# Patient Record
Sex: FEMALE | Race: WHITE | Employment: OTHER | URBAN - METROPOLITAN AREA
[De-identification: names, ages, dates, MRNs, and addresses within clinical notes are randomized per-mention and may not be internally consistent; named-entity substitution may affect disease eponyms.]

---

## 2018-12-11 ENCOUNTER — OFFICE VISIT (OUTPATIENT)
Dept: INTERNAL MEDICINE CLINIC | Age: 83
End: 2018-12-11
Payer: MEDICARE

## 2018-12-11 VITALS
HEART RATE: 81 BPM | SYSTOLIC BLOOD PRESSURE: 123 MMHG | OXYGEN SATURATION: 98 % | RESPIRATION RATE: 16 BRPM | BODY MASS INDEX: 18.19 KG/M2 | WEIGHT: 120 LBS | DIASTOLIC BLOOD PRESSURE: 70 MMHG | HEIGHT: 68 IN

## 2018-12-11 DIAGNOSIS — N93.9 VAGINAL BLEEDING: ICD-10-CM

## 2018-12-11 DIAGNOSIS — Z79.01 CHRONIC ANTICOAGULATION: ICD-10-CM

## 2018-12-11 DIAGNOSIS — G31.84 MCI (MILD COGNITIVE IMPAIRMENT): ICD-10-CM

## 2018-12-11 DIAGNOSIS — L30.9 DERMATITIS: ICD-10-CM

## 2018-12-11 DIAGNOSIS — R53.1 ASTHENIA: ICD-10-CM

## 2018-12-11 DIAGNOSIS — M81.0 AGE-RELATED OSTEOPOROSIS WITHOUT CURRENT PATHOLOGICAL FRACTURE: ICD-10-CM

## 2018-12-11 DIAGNOSIS — E78.5 HYPERLIPIDEMIA, UNSPECIFIED HYPERLIPIDEMIA TYPE: ICD-10-CM

## 2018-12-11 DIAGNOSIS — J98.4 RESTRICTIVE LUNG DISEASE: ICD-10-CM

## 2018-12-11 DIAGNOSIS — E53.8 VITAMIN B12 DEFICIENCY: ICD-10-CM

## 2018-12-11 DIAGNOSIS — E03.9 ACQUIRED HYPOTHYROIDISM: ICD-10-CM

## 2018-12-11 DIAGNOSIS — Z86.718 HISTORY OF RECURRENT DEEP VEIN THROMBOSIS (DVT): Primary | ICD-10-CM

## 2018-12-11 DIAGNOSIS — Z86.2 HISTORY OF IRON DEFICIENCY ANEMIA: ICD-10-CM

## 2018-12-11 DIAGNOSIS — I35.1 NONRHEUMATIC AORTIC VALVE INSUFFICIENCY: ICD-10-CM

## 2018-12-11 DIAGNOSIS — I10 ESSENTIAL HYPERTENSION: ICD-10-CM

## 2018-12-11 PROCEDURE — 99204 OFFICE O/P NEW MOD 45 MIN: CPT | Performed by: INTERNAL MEDICINE

## 2018-12-11 RX ORDER — CLOTRIMAZOLE AND BETAMETHASONE DIPROPIONATE 10; .64 MG/G; MG/G
CREAM TOPICAL 2 TIMES DAILY
Qty: 45 G | Refills: 1 | Status: SHIPPED | OUTPATIENT
Start: 2018-12-11 | End: 2018-12-21

## 2018-12-11 ASSESSMENT — PATIENT HEALTH QUESTIONNAIRE - PHQ9
SUM OF ALL RESPONSES TO PHQ QUESTIONS 1-9: 0
1. LITTLE INTEREST OR PLEASURE IN DOING THINGS: 0
SUM OF ALL RESPONSES TO PHQ QUESTIONS 1-9: 0
2. FEELING DOWN, DEPRESSED OR HOPELESS: 0
SUM OF ALL RESPONSES TO PHQ9 QUESTIONS 1 & 2: 0

## 2018-12-11 NOTE — PROGRESS NOTES
Small Adult)   Pulse 81   Resp 16   Ht 5' 8\" (1.727 m)   Wt 120 lb (54.4 kg)   SpO2 98%   BMI 18.25 kg/m²       GENERAL APPEARANCE:    Alert, oriented x 2, well developed, cooperative, not in any distress, appears stated age. HEAD:   Normocephalic, atraumatic   EYES:   PERRLA, EOMI, lids normal, conjuctivea clear, sclera anicteric. ENT/ MOUTH:  Mucous membranes are moist. Completely edentulous. She is using a full upper and lower dentures. Tonsils are absent. Hearing to finger rub is intact. External ear normal b/l. Normal nose bridge, no DNS. NECK:    Supple, symmetrical,  trachea midline, no thyromegaly, no carotid bruit or JVD. No masses palpated. LUNGS:    Clear to auscultation bilaterally, respirations unlabored, accessory muscles are not used. HEART:     Regular rate and rhythm, S1 and S2 normal, JUAN 3/6, no rub or gallop. PMI in MCL. ABDOMEN:    Soft, non-tender, bowel sounds are normoactive, no masses, no hepatospleenomegaly. LYMPHATIC:  No occipital, post and preauricular, cervical, supraclavicular/infraclavicular lymphadenopathy noted. EXTREMITY:   No bipedal edema. she has chronic discoloration of left foot and left lower leg. She has prominent varicose vein in both lower legs. NEURO:  Alert, oriented to person, place. CN II- PERRL, visual Field intact. CN II, IV, VI- EOM full, no nystagmus     CN V- normal facial sensation. CN VII- facial movement symmetric, strength normal, no facial droop. CN IX, X- symmetric elevation of soft palate, no dysrthria     CN XI- shoulder shrug normal. Normal head rotation     CN XII- tongue midline, no fasciculation. Sensation intact to touch, pin, vibration and proprioception b/l. Reflexes are +2 bilateral, Plantar response is flexor     Motor 5/5 b/l     Normal Gait and station. No ataxia.   FOOT EXAM:   Warm feet, no calus, open sores or ulcers,      Sensation intact to touch, temp, proprioception and

## 2018-12-13 ENCOUNTER — TELEPHONE (OUTPATIENT)
Dept: INTERNAL MEDICINE CLINIC | Age: 83
End: 2018-12-13

## 2018-12-13 ENCOUNTER — HOSPITAL ENCOUNTER (OUTPATIENT)
Age: 83
Setting detail: SPECIMEN
Discharge: HOME | End: 2018-12-13
Payer: MEDICARE

## 2018-12-20 ENCOUNTER — TELEPHONE (OUTPATIENT)
Dept: INTERNAL MEDICINE CLINIC | Age: 83
End: 2018-12-20

## 2018-12-20 ENCOUNTER — HOSPITAL ENCOUNTER (OUTPATIENT)
Age: 83
Setting detail: SPECIMEN
Discharge: HOME | End: 2018-12-20
Payer: MEDICARE

## 2018-12-20 DIAGNOSIS — E03.9 ACQUIRED HYPOTHYROIDISM: Primary | ICD-10-CM

## 2018-12-20 DIAGNOSIS — M81.0 AGE-RELATED OSTEOPOROSIS WITHOUT CURRENT PATHOLOGICAL FRACTURE: ICD-10-CM

## 2018-12-20 PROBLEM — Z79.01 CHRONIC ANTICOAGULATION: Status: ACTIVE | Noted: 2018-12-20

## 2018-12-20 PROBLEM — Z86.718 HISTORY OF RECURRENT DEEP VEIN THROMBOSIS (DVT): Status: ACTIVE | Noted: 2018-12-20

## 2018-12-20 PROBLEM — G31.84 MCI (MILD COGNITIVE IMPAIRMENT): Status: ACTIVE | Noted: 2018-12-20

## 2018-12-20 PROBLEM — J98.4 RESTRICTIVE LUNG DISEASE: Status: ACTIVE | Noted: 2018-12-20

## 2018-12-20 LAB
ALBUMIN SERPL-MCNC: 4.1 GM/DL (ref 3.4–5)
ALP BLD-CCNC: 61 IU/L (ref 40–129)
ALT SERPL-CCNC: 13 U/L (ref 10–40)
ANION GAP SERPL CALCULATED.3IONS-SCNC: 11 MMOL/L (ref 4–16)
AST SERPL-CCNC: 18 IU/L (ref 15–37)
BASOPHILS ABSOLUTE: 0.1 K/CU MM
BASOPHILS RELATIVE PERCENT: 0.9 % (ref 0–1)
BILIRUB SERPL-MCNC: 0.3 MG/DL (ref 0–1)
BUN BLDV-MCNC: 35 MG/DL (ref 6–23)
CALCIUM SERPL-MCNC: 9 MG/DL (ref 8.3–10.6)
CHLORIDE BLD-SCNC: 103 MMOL/L (ref 99–110)
CHOLESTEROL: 176 MG/DL
CO2: 29 MMOL/L (ref 21–32)
CREAT SERPL-MCNC: 0.8 MG/DL (ref 0.6–1.1)
DIFFERENTIAL TYPE: ABNORMAL
EOSINOPHILS ABSOLUTE: 0.3 K/CU MM
EOSINOPHILS RELATIVE PERCENT: 5.1 % (ref 0–3)
FERRITIN: 24 NG/ML (ref 15–150)
FOLATE: >20 NG/ML (ref 3.1–17.5)
GFR AFRICAN AMERICAN: >60 ML/MIN/1.73M2
GFR NON-AFRICAN AMERICAN: >60 ML/MIN/1.73M2
GLUCOSE BLD-MCNC: 78 MG/DL (ref 70–99)
HCT VFR BLD CALC: 41.8 % (ref 37–47)
HDLC SERPL-MCNC: 99 MG/DL
HEMOGLOBIN: 12.2 GM/DL (ref 12.5–16)
IMMATURE NEUTROPHIL %: 0.2 % (ref 0–0.43)
LDL CHOLESTEROL DIRECT: 80 MG/DL
LYMPHOCYTES ABSOLUTE: 1.7 K/CU MM
LYMPHOCYTES RELATIVE PERCENT: 30.1 % (ref 24–44)
MCH RBC QN AUTO: 28.2 PG (ref 27–31)
MCHC RBC AUTO-ENTMCNC: 29.2 % (ref 32–36)
MCV RBC AUTO: 96.5 FL (ref 78–100)
MONOCYTES ABSOLUTE: 0.5 K/CU MM
MONOCYTES RELATIVE PERCENT: 8.9 % (ref 0–4)
NUCLEATED RBC %: 0 %
PDW BLD-RTO: 15 % (ref 11.7–14.9)
PLATELET # BLD: 154 K/CU MM (ref 140–440)
PMV BLD AUTO: 12.2 FL (ref 7.5–11.1)
POTASSIUM SERPL-SCNC: 4.4 MMOL/L (ref 3.5–5.1)
RBC # BLD: 4.33 M/CU MM (ref 4.2–5.4)
SEGMENTED NEUTROPHILS ABSOLUTE COUNT: 3.1 K/CU MM
SEGMENTED NEUTROPHILS RELATIVE PERCENT: 54.8 % (ref 36–66)
SODIUM BLD-SCNC: 143 MMOL/L (ref 135–145)
T4 FREE: 0.83 NG/DL (ref 0.9–1.8)
TOTAL IMMATURE NEUTOROPHIL: 0.01 K/CU MM
TOTAL NUCLEATED RBC: 0 K/CU MM
TOTAL PROTEIN: 6.5 GM/DL (ref 6.4–8.2)
TRIGL SERPL-MCNC: 72 MG/DL
TSH HIGH SENSITIVITY: 17.14 UIU/ML (ref 0.27–4.2)
VITAMIN B-12: 871.5 PG/ML (ref 211–911)
VITAMIN D 25-HYDROXY: 32.52 NG/ML
WBC # BLD: 5.7 K/CU MM (ref 4–10.5)

## 2018-12-20 PROCEDURE — 84443 ASSAY THYROID STIM HORMONE: CPT

## 2018-12-20 PROCEDURE — 82306 VITAMIN D 25 HYDROXY: CPT

## 2018-12-20 PROCEDURE — 82607 VITAMIN B-12: CPT

## 2018-12-20 PROCEDURE — 36415 COLL VENOUS BLD VENIPUNCTURE: CPT

## 2018-12-20 PROCEDURE — 80061 LIPID PANEL: CPT

## 2018-12-20 PROCEDURE — 82728 ASSAY OF FERRITIN: CPT

## 2018-12-20 PROCEDURE — 82746 ASSAY OF FOLIC ACID SERUM: CPT

## 2018-12-20 PROCEDURE — 80053 COMPREHEN METABOLIC PANEL: CPT

## 2018-12-20 PROCEDURE — 83721 ASSAY OF BLOOD LIPOPROTEIN: CPT

## 2018-12-20 PROCEDURE — 84439 ASSAY OF FREE THYROXINE: CPT

## 2018-12-20 PROCEDURE — 85025 COMPLETE CBC W/AUTO DIFF WBC: CPT

## 2018-12-20 RX ORDER — LEVOTHYROXINE SODIUM 0.05 MG/1
50 TABLET ORAL DAILY
Qty: 90 TABLET | Refills: 0 | Status: SHIPPED | OUTPATIENT
Start: 2018-12-20 | End: 2018-12-21

## 2018-12-21 ENCOUNTER — OFFICE VISIT (OUTPATIENT)
Dept: FAMILY MEDICINE CLINIC | Age: 83
End: 2018-12-21
Payer: MEDICARE

## 2018-12-21 VITALS
HEART RATE: 86 BPM | HEIGHT: 64 IN | TEMPERATURE: 98.4 F | DIASTOLIC BLOOD PRESSURE: 70 MMHG | SYSTOLIC BLOOD PRESSURE: 130 MMHG | WEIGHT: 95 LBS | OXYGEN SATURATION: 99 % | BODY MASS INDEX: 16.22 KG/M2

## 2018-12-21 DIAGNOSIS — Z86.718 HISTORY OF RECURRENT DEEP VEIN THROMBOSIS (DVT): ICD-10-CM

## 2018-12-21 DIAGNOSIS — Z79.01 CHRONIC ANTICOAGULATION: ICD-10-CM

## 2018-12-21 DIAGNOSIS — L30.9 DERMATITIS: ICD-10-CM

## 2018-12-21 PROCEDURE — 1123F ACP DISCUSS/DSCN MKR DOCD: CPT | Performed by: NURSE PRACTITIONER

## 2018-12-21 PROCEDURE — 99213 OFFICE O/P EST LOW 20 MIN: CPT | Performed by: NURSE PRACTITIONER

## 2018-12-21 PROCEDURE — G8427 DOCREV CUR MEDS BY ELIG CLIN: HCPCS | Performed by: NURSE PRACTITIONER

## 2018-12-21 PROCEDURE — 1036F TOBACCO NON-USER: CPT | Performed by: NURSE PRACTITIONER

## 2018-12-21 PROCEDURE — 1090F PRES/ABSN URINE INCON ASSESS: CPT | Performed by: NURSE PRACTITIONER

## 2018-12-21 PROCEDURE — G8484 FLU IMMUNIZE NO ADMIN: HCPCS | Performed by: NURSE PRACTITIONER

## 2018-12-21 PROCEDURE — 1101F PT FALLS ASSESS-DOCD LE1/YR: CPT | Performed by: NURSE PRACTITIONER

## 2018-12-21 PROCEDURE — 4040F PNEUMOC VAC/ADMIN/RCVD: CPT | Performed by: NURSE PRACTITIONER

## 2018-12-21 PROCEDURE — G8419 CALC BMI OUT NRM PARAM NOF/U: HCPCS | Performed by: NURSE PRACTITIONER

## 2018-12-21 ASSESSMENT — ENCOUNTER SYMPTOMS
CHEST TIGHTNESS: 0
NAUSEA: 0
COUGH: 0
COLOR CHANGE: 1
SHORTNESS OF BREATH: 0
VOMITING: 0
WHEEZING: 0

## 2018-12-21 NOTE — PATIENT INSTRUCTIONS
[facility] and set up plan to have your prescriptions delivered - [phone]  Use the Bactroban and Lotrisone medicine for your left leg  Check with pharmacy to see if there is a prescription for your thyroid - take the medicine as directed  Keep left leg elevated at least twice a day to decrease the swelling  Increase your fluids; also continue drinking your Ensure  Follow up as needed

## 2018-12-23 ENCOUNTER — APPOINTMENT (OUTPATIENT)
Dept: ULTRASOUND IMAGING | Age: 83
End: 2018-12-23
Payer: MEDICARE

## 2018-12-23 ENCOUNTER — HOSPITAL ENCOUNTER (EMERGENCY)
Age: 83
Discharge: HOME | End: 2018-12-23
Attending: EMERGENCY MEDICINE
Payer: MEDICARE

## 2018-12-23 VITALS
OXYGEN SATURATION: 100 % | WEIGHT: 95 LBS | RESPIRATION RATE: 16 BRPM | HEIGHT: 64 IN | HEART RATE: 87 BPM | BODY MASS INDEX: 16.22 KG/M2 | DIASTOLIC BLOOD PRESSURE: 90 MMHG | SYSTOLIC BLOOD PRESSURE: 145 MMHG | TEMPERATURE: 98.1 F

## 2018-12-23 DIAGNOSIS — L03.116 CELLULITIS OF LEFT LOWER EXTREMITY: ICD-10-CM

## 2018-12-23 DIAGNOSIS — Z86.718 HISTORY OF RECURRENT DEEP VEIN THROMBOSIS (DVT): ICD-10-CM

## 2018-12-23 DIAGNOSIS — I87.2 VENOUS STASIS DERMATITIS OF LEFT LOWER EXTREMITY: ICD-10-CM

## 2018-12-23 DIAGNOSIS — M79.89 LEG SWELLING: Primary | ICD-10-CM

## 2018-12-23 DIAGNOSIS — Z79.01 CHRONIC ANTICOAGULATION: ICD-10-CM

## 2018-12-23 PROCEDURE — 93971 EXTREMITY STUDY: CPT

## 2018-12-23 PROCEDURE — 6370000000 HC RX 637 (ALT 250 FOR IP): Performed by: PHYSICIAN ASSISTANT

## 2018-12-23 PROCEDURE — 99283 EMERGENCY DEPT VISIT LOW MDM: CPT

## 2018-12-23 RX ORDER — ACETAMINOPHEN 325 MG/1
650 TABLET ORAL EVERY 4 HOURS PRN
Status: DISCONTINUED | OUTPATIENT
Start: 2018-12-23 | End: 2018-12-23 | Stop reason: HOSPADM

## 2018-12-23 RX ORDER — DOXYCYCLINE HYCLATE 100 MG/1
100 CAPSULE ORAL 2 TIMES DAILY
Qty: 20 CAPSULE | Refills: 0 | Status: SHIPPED | OUTPATIENT
Start: 2018-12-23 | End: 2019-01-01

## 2018-12-23 RX ADMIN — ACETAMINOPHEN 650 MG: 325 TABLET ORAL at 14:31

## 2018-12-23 ASSESSMENT — PAIN DESCRIPTION - LOCATION: LOCATION: LEG

## 2018-12-23 ASSESSMENT — PAIN DESCRIPTION - ORIENTATION: ORIENTATION: LEFT

## 2018-12-23 ASSESSMENT — PAIN SCALES - GENERAL
PAINLEVEL_OUTOF10: 7
PAINLEVEL_OUTOF10: 7

## 2018-12-23 ASSESSMENT — PAIN DESCRIPTION - PAIN TYPE: TYPE: ACUTE PAIN

## 2018-12-23 NOTE — ED PROVIDER NOTES
I independently examined and evaluated Joanne Kessler. In brief, Patient with leg pain. Focused exam revealed Redness warmth noted to left lower extremity. Neurovascular exam intact    ED course: Patient here with leg complaints ultrasound negative for DVT, has a cellulitis will be placed on antibiotics and we'll discharge, refer her to outpatient follow-up as she is new to town, she understands and agrees return warnings given. All diagnostic, treatment, and disposition decisions were made by myself in conjunction with the advanced practice provider. For all further details of the patient's emergency department visit, please see the advanced practice provider's documentation. Comment: Please note this report has been produced using speech recognition software and may contain errors related to that system including errors in grammar, punctuation, and spelling, as well as words and phrases that may be inappropriate. If there are any questions or concerns please feel free to contact the dictating provider for clarification.         Phuong Rodriguez MD  12/23/18 [id number]
Sheri Vargas MD (12/23/18 15:21:32)                Impression:    1. No evidence of DVT in the left lower extremity. 2. Left lower extremity edema. Narrative:    EXAMINATION:  DUPLEX VENOUS ULTRASOUND OF THE LEFT LOWER EXTREMITY    12/23/2018 2:34 pm    TECHNIQUE:  Duplex ultrasound and Doppler images were obtained of the left lower  extremity. COMPARISON:  None. HISTORY:  ORDERING SYSTEM PROVIDED HISTORY: leg swelling  TECHNOLOGIST PROVIDED HISTORY:  Reason for exam:->leg swelling  Acuity: Acute  Type of Exam: Initial    FINDINGS:  The visualized veins of the left lower extremity are patent and free of  echogenic thrombus. The veins are normally compressible and have normal  phasic flow. ED COURSE & MEDICAL DECISION MAKING       Vital signs and nursing notes reviewed during ED course. I have independently evaluated this patient . Supervising MD present in the Emergency Department, available for consultation, throughout entirety of  patient care. All pertinent Lab data and radiographic results reviewed with patient at bedside. The patient and/or the family were informed of the results of any tests/labs/imaging, the treatment plan, and time was allotted to answer questions. Pt presents as above. Vitals today show the pt is afebrile. 100% on room air. Non tachycardic. Pt is hypertensive. Eyes any headache or visual symptoms no chest pain or shortness of breath. No urinary symptoms. I suspect this is likely chronic, I suspect some confusion, dementia associated with the confusion of correct treatment in length of time from symptoms starting. We'll obtain ultrasound and then discuss further with the patient. Ultrasound shows no evidence of DVT. It appears as an complication given the patient is new to the area has some changeover in follow-up, care. Confusion on medications, patient has some mild baseline dementia family at bedside agrees with this.  I did

## 2018-12-23 NOTE — ED NOTES
Bed: ED-31  Expected date: 12/23/18  Expected time:   Means of arrival: [facility] Department  Comments:  M8  Leg red, swollen     Armando Harris, BETTE  12/23/18 [id number]

## 2019-01-01 ENCOUNTER — HOSPITAL ENCOUNTER (INPATIENT)
Age: 84
LOS: 4 days | Discharge: NURSING, REHAB OR LONG-TERM CARE | DRG: 603 | End: 2019-01-05
Attending: EMERGENCY MEDICINE | Admitting: INTERNAL MEDICINE
Payer: MEDICARE

## 2019-01-01 DIAGNOSIS — L03.116 CELLULITIS OF LEFT LOWER EXTREMITY: Primary | ICD-10-CM

## 2019-01-01 PROBLEM — L03.90 CELLULITIS: Status: ACTIVE | Noted: 2019-01-01

## 2019-01-01 LAB
ALBUMIN SERPL-MCNC: 4.1 GM/DL (ref 3.4–5)
ALP BLD-CCNC: 69 IU/L (ref 40–129)
ALT SERPL-CCNC: 14 U/L (ref 10–40)
ANION GAP SERPL CALCULATED.3IONS-SCNC: 8 MMOL/L (ref 4–16)
AST SERPL-CCNC: 18 IU/L (ref 15–37)
BASOPHILS ABSOLUTE: 0 K/CU MM
BASOPHILS RELATIVE PERCENT: 0.5 % (ref 0–1)
BILIRUB SERPL-MCNC: 0.3 MG/DL (ref 0–1)
BUN BLDV-MCNC: 28 MG/DL (ref 6–23)
CALCIUM SERPL-MCNC: 9 MG/DL (ref 8.3–10.6)
CHLORIDE BLD-SCNC: 101 MMOL/L (ref 99–110)
CO2: 30 MMOL/L (ref 21–32)
CREAT SERPL-MCNC: 0.7 MG/DL (ref 0.6–1.1)
DIFFERENTIAL TYPE: ABNORMAL
EOSINOPHILS ABSOLUTE: 0.1 K/CU MM
EOSINOPHILS RELATIVE PERCENT: 2.1 % (ref 0–3)
GFR AFRICAN AMERICAN: >60 ML/MIN/1.73M2
GFR NON-AFRICAN AMERICAN: >60 ML/MIN/1.73M2
GLUCOSE BLD-MCNC: 83 MG/DL (ref 70–99)
HCT VFR BLD CALC: 38.8 % (ref 37–47)
HEMOGLOBIN: 11.7 GM/DL (ref 12.5–16)
IMMATURE NEUTROPHIL %: 0.3 % (ref 0–0.43)
LYMPHOCYTES ABSOLUTE: 1.1 K/CU MM
LYMPHOCYTES RELATIVE PERCENT: 17 % (ref 24–44)
MCH RBC QN AUTO: 27.9 PG (ref 27–31)
MCHC RBC AUTO-ENTMCNC: 30.2 % (ref 32–36)
MCV RBC AUTO: 92.6 FL (ref 78–100)
MONOCYTES ABSOLUTE: 0.5 K/CU MM
MONOCYTES RELATIVE PERCENT: 7.8 % (ref 0–4)
NUCLEATED RBC %: 0 %
PDW BLD-RTO: 14.8 % (ref 11.7–14.9)
PLATELET # BLD: 161 K/CU MM (ref 140–440)
PMV BLD AUTO: 11.5 FL (ref 7.5–11.1)
POTASSIUM SERPL-SCNC: 4.1 MMOL/L (ref 3.5–5.1)
RBC # BLD: 4.19 M/CU MM (ref 4.2–5.4)
SEGMENTED NEUTROPHILS ABSOLUTE COUNT: 4.7 K/CU MM
SEGMENTED NEUTROPHILS RELATIVE PERCENT: 72.3 % (ref 36–66)
SODIUM BLD-SCNC: 139 MMOL/L (ref 135–145)
TOTAL IMMATURE NEUTOROPHIL: 0.02 K/CU MM
TOTAL NUCLEATED RBC: 0 K/CU MM
TOTAL PROTEIN: 7.1 GM/DL (ref 6.4–8.2)
WBC # BLD: 6.5 K/CU MM (ref 4–10.5)

## 2019-01-01 PROCEDURE — 80053 COMPREHEN METABOLIC PANEL: CPT

## 2019-01-01 PROCEDURE — 1200000000 HC SEMI PRIVATE

## 2019-01-01 PROCEDURE — 2580000003 HC RX 258: Performed by: INTERNAL MEDICINE

## 2019-01-01 PROCEDURE — 2580000003 HC RX 258: Performed by: EMERGENCY MEDICINE

## 2019-01-01 PROCEDURE — 36415 COLL VENOUS BLD VENIPUNCTURE: CPT

## 2019-01-01 PROCEDURE — 87040 BLOOD CULTURE FOR BACTERIA: CPT

## 2019-01-01 PROCEDURE — 85025 COMPLETE CBC W/AUTO DIFF WBC: CPT

## 2019-01-01 PROCEDURE — 6370000000 HC RX 637 (ALT 250 FOR IP): Performed by: INTERNAL MEDICINE

## 2019-01-01 PROCEDURE — 99284 EMERGENCY DEPT VISIT MOD MDM: CPT

## 2019-01-01 PROCEDURE — 2500000003 HC RX 250 WO HCPCS: Performed by: EMERGENCY MEDICINE

## 2019-01-01 PROCEDURE — 6360000002 HC RX W HCPCS: Performed by: EMERGENCY MEDICINE

## 2019-01-01 RX ORDER — ACETAMINOPHEN 325 MG/1
650 TABLET ORAL EVERY 4 HOURS PRN
Status: DISCONTINUED | OUTPATIENT
Start: 2019-01-01 | End: 2019-01-05 | Stop reason: HOSPADM

## 2019-01-01 RX ORDER — SODIUM CHLORIDE 0.9 % (FLUSH) 0.9 %
10 SYRINGE (ML) INJECTION PRN
Status: DISCONTINUED | OUTPATIENT
Start: 2019-01-01 | End: 2019-01-05 | Stop reason: HOSPADM

## 2019-01-01 RX ORDER — SODIUM CHLORIDE 0.9 % (FLUSH) 0.9 %
10 SYRINGE (ML) INJECTION EVERY 12 HOURS SCHEDULED
Status: DISCONTINUED | OUTPATIENT
Start: 2019-01-01 | End: 2019-01-05 | Stop reason: HOSPADM

## 2019-01-01 RX ORDER — VANCOMYCIN HYDROCHLORIDE 1 G/200ML
1000 INJECTION, SOLUTION INTRAVENOUS ONCE
Status: COMPLETED | OUTPATIENT
Start: 2019-01-01 | End: 2019-01-01

## 2019-01-01 RX ADMIN — CLINDAMYCIN PHOSPHATE 600 MG: 150 INJECTION, SOLUTION, CONCENTRATE INTRAVENOUS at 16:14

## 2019-01-01 RX ADMIN — VANCOMYCIN HYDROCHLORIDE 1000 MG: 1 INJECTION, SOLUTION INTRAVENOUS at 16:45

## 2019-01-01 RX ADMIN — SODIUM CHLORIDE, PRESERVATIVE FREE 10 ML: 5 INJECTION INTRAVENOUS at 23:03

## 2019-01-01 RX ADMIN — ACETAMINOPHEN 650 MG: 325 TABLET ORAL at 23:33

## 2019-01-01 ASSESSMENT — PAIN DESCRIPTION - PAIN TYPE
TYPE: ACUTE PAIN
TYPE: ACUTE PAIN

## 2019-01-01 ASSESSMENT — PAIN SCALES - GENERAL
PAINLEVEL_OUTOF10: 6
PAINLEVEL_OUTOF10: 0
PAINLEVEL_OUTOF10: 0
PAINLEVEL_OUTOF10: 3

## 2019-01-01 ASSESSMENT — PAIN DESCRIPTION - LOCATION
LOCATION: LEG
LOCATION: LEG

## 2019-01-01 ASSESSMENT — PAIN DESCRIPTION - ORIENTATION
ORIENTATION: LEFT
ORIENTATION: LEFT

## 2019-01-01 ASSESSMENT — PAIN DESCRIPTION - DESCRIPTORS: DESCRIPTORS: CONSTANT

## 2019-01-01 ASSESSMENT — PAIN DESCRIPTION - PROGRESSION: CLINICAL_PROGRESSION: GRADUALLY WORSENING

## 2019-01-01 ASSESSMENT — PAIN DESCRIPTION - ONSET: ONSET: ON-GOING

## 2019-01-02 PROBLEM — E43 SEVERE MALNUTRITION (HCC): Chronic | Status: ACTIVE | Noted: 2019-01-02

## 2019-01-02 LAB
ANION GAP SERPL CALCULATED.3IONS-SCNC: 12 MMOL/L (ref 4–16)
BASOPHILS ABSOLUTE: 0.1 K/CU MM
BASOPHILS RELATIVE PERCENT: 1.2 % (ref 0–1)
BUN BLDV-MCNC: 21 MG/DL (ref 6–23)
CALCIUM SERPL-MCNC: 8.9 MG/DL (ref 8.3–10.6)
CHLORIDE BLD-SCNC: 104 MMOL/L (ref 99–110)
CO2: 27 MMOL/L (ref 21–32)
CREAT SERPL-MCNC: 0.9 MG/DL (ref 0.6–1.1)
DIFFERENTIAL TYPE: ABNORMAL
EOSINOPHILS ABSOLUTE: 0.2 K/CU MM
EOSINOPHILS RELATIVE PERCENT: 5.7 % (ref 0–3)
GFR AFRICAN AMERICAN: >60 ML/MIN/1.73M2
GFR NON-AFRICAN AMERICAN: 59 ML/MIN/1.73M2
GLUCOSE BLD-MCNC: 82 MG/DL (ref 70–99)
HCT VFR BLD CALC: 40.4 % (ref 37–47)
HEMOGLOBIN: 11.9 GM/DL (ref 12.5–16)
IMMATURE NEUTROPHIL %: 0 % (ref 0–0.43)
LYMPHOCYTES ABSOLUTE: 1 K/CU MM
LYMPHOCYTES RELATIVE PERCENT: 23.3 % (ref 24–44)
MCH RBC QN AUTO: 27.5 PG (ref 27–31)
MCHC RBC AUTO-ENTMCNC: 29.5 % (ref 32–36)
MCV RBC AUTO: 93.5 FL (ref 78–100)
MONOCYTES ABSOLUTE: 0.4 K/CU MM
MONOCYTES RELATIVE PERCENT: 9.1 % (ref 0–4)
NUCLEATED RBC %: 0 %
PDW BLD-RTO: 14.6 % (ref 11.7–14.9)
PLATELET # BLD: 149 K/CU MM (ref 140–440)
PMV BLD AUTO: 11.6 FL (ref 7.5–11.1)
POTASSIUM SERPL-SCNC: 4.2 MMOL/L (ref 3.5–5.1)
RBC # BLD: 4.32 M/CU MM (ref 4.2–5.4)
SEGMENTED NEUTROPHILS ABSOLUTE COUNT: 2.5 K/CU MM
SEGMENTED NEUTROPHILS RELATIVE PERCENT: 60.7 % (ref 36–66)
SODIUM BLD-SCNC: 143 MMOL/L (ref 135–145)
TOTAL IMMATURE NEUTOROPHIL: 0 K/CU MM
TOTAL NUCLEATED RBC: 0 K/CU MM
WBC # BLD: 4.1 K/CU MM (ref 4–10.5)

## 2019-01-02 PROCEDURE — 36415 COLL VENOUS BLD VENIPUNCTURE: CPT

## 2019-01-02 PROCEDURE — 6370000000 HC RX 637 (ALT 250 FOR IP): Performed by: INTERNAL MEDICINE

## 2019-01-02 PROCEDURE — 80048 BASIC METABOLIC PNL TOTAL CA: CPT

## 2019-01-02 PROCEDURE — 2500000003 HC RX 250 WO HCPCS: Performed by: INTERNAL MEDICINE

## 2019-01-02 PROCEDURE — 2580000003 HC RX 258: Performed by: INTERNAL MEDICINE

## 2019-01-02 PROCEDURE — 1200000000 HC SEMI PRIVATE

## 2019-01-02 PROCEDURE — 85025 COMPLETE CBC W/AUTO DIFF WBC: CPT

## 2019-01-02 RX ADMIN — CLINDAMYCIN PHOSPHATE 600 MG: 150 INJECTION, SOLUTION, CONCENTRATE INTRAVENOUS at 14:16

## 2019-01-02 RX ADMIN — ACETAMINOPHEN 650 MG: 325 TABLET ORAL at 15:40

## 2019-01-02 RX ADMIN — ACETAMINOPHEN 650 MG: 325 TABLET ORAL at 06:46

## 2019-01-02 RX ADMIN — RIVAROXABAN 15 MG: 15 TABLET, FILM COATED ORAL at 08:03

## 2019-01-02 RX ADMIN — SODIUM CHLORIDE, PRESERVATIVE FREE 10 ML: 5 INJECTION INTRAVENOUS at 08:02

## 2019-01-02 RX ADMIN — ACETAMINOPHEN 650 MG: 325 TABLET ORAL at 21:06

## 2019-01-02 RX ADMIN — CLINDAMYCIN PHOSPHATE 600 MG: 150 INJECTION, SOLUTION, CONCENTRATE INTRAVENOUS at 21:04

## 2019-01-02 RX ADMIN — SODIUM CHLORIDE, PRESERVATIVE FREE 10 ML: 5 INJECTION INTRAVENOUS at 21:04

## 2019-01-02 RX ADMIN — ACETAMINOPHEN 650 MG: 325 TABLET ORAL at 11:26

## 2019-01-02 ASSESSMENT — PAIN DESCRIPTION - DESCRIPTORS
DESCRIPTORS: CONSTANT

## 2019-01-02 ASSESSMENT — PAIN SCALES - GENERAL
PAINLEVEL_OUTOF10: 3
PAINLEVEL_OUTOF10: 7
PAINLEVEL_OUTOF10: 6
PAINLEVEL_OUTOF10: 7
PAINLEVEL_OUTOF10: 10

## 2019-01-02 ASSESSMENT — PAIN DESCRIPTION - ORIENTATION
ORIENTATION: LEFT

## 2019-01-02 ASSESSMENT — PAIN DESCRIPTION - LOCATION
LOCATION: LEG

## 2019-01-02 ASSESSMENT — PAIN DESCRIPTION - ONSET
ONSET: ON-GOING

## 2019-01-02 ASSESSMENT — PAIN DESCRIPTION - PROGRESSION
CLINICAL_PROGRESSION: GRADUALLY WORSENING
CLINICAL_PROGRESSION: GRADUALLY WORSENING

## 2019-01-02 ASSESSMENT — PAIN DESCRIPTION - FREQUENCY
FREQUENCY: CONTINUOUS

## 2019-01-02 ASSESSMENT — PAIN DESCRIPTION - PAIN TYPE
TYPE: ACUTE PAIN

## 2019-01-03 LAB
ANION GAP SERPL CALCULATED.3IONS-SCNC: 8 MMOL/L (ref 4–16)
BASOPHILS ABSOLUTE: 0.1 K/CU MM
BASOPHILS RELATIVE PERCENT: 1.1 % (ref 0–1)
BUN BLDV-MCNC: 21 MG/DL (ref 6–23)
CALCIUM SERPL-MCNC: 8.9 MG/DL (ref 8.3–10.6)
CHLORIDE BLD-SCNC: 102 MMOL/L (ref 99–110)
CO2: 30 MMOL/L (ref 21–32)
CREAT SERPL-MCNC: 0.9 MG/DL (ref 0.6–1.1)
DIFFERENTIAL TYPE: ABNORMAL
EOSINOPHILS ABSOLUTE: 0.2 K/CU MM
EOSINOPHILS RELATIVE PERCENT: 5.2 % (ref 0–3)
GFR AFRICAN AMERICAN: >60 ML/MIN/1.73M2
GFR NON-AFRICAN AMERICAN: 59 ML/MIN/1.73M2
GLUCOSE BLD-MCNC: 86 MG/DL (ref 70–99)
HCT VFR BLD CALC: 38.9 % (ref 37–47)
HEMOGLOBIN: 12 GM/DL (ref 12.5–16)
IMMATURE NEUTROPHIL %: 0.2 % (ref 0–0.43)
LYMPHOCYTES ABSOLUTE: 1 K/CU MM
LYMPHOCYTES RELATIVE PERCENT: 22.8 % (ref 24–44)
MCH RBC QN AUTO: 28 PG (ref 27–31)
MCHC RBC AUTO-ENTMCNC: 30.8 % (ref 32–36)
MCV RBC AUTO: 90.7 FL (ref 78–100)
MONOCYTES ABSOLUTE: 0.4 K/CU MM
MONOCYTES RELATIVE PERCENT: 8.7 % (ref 0–4)
NUCLEATED RBC %: 0 %
PDW BLD-RTO: 14.6 % (ref 11.7–14.9)
PLATELET # BLD: 161 K/CU MM (ref 140–440)
PMV BLD AUTO: 11.4 FL (ref 7.5–11.1)
POTASSIUM SERPL-SCNC: 4.2 MMOL/L (ref 3.5–5.1)
RBC # BLD: 4.29 M/CU MM (ref 4.2–5.4)
SEGMENTED NEUTROPHILS ABSOLUTE COUNT: 2.7 K/CU MM
SEGMENTED NEUTROPHILS RELATIVE PERCENT: 62 % (ref 36–66)
SODIUM BLD-SCNC: 140 MMOL/L (ref 135–145)
TOTAL IMMATURE NEUTOROPHIL: 0.01 K/CU MM
TOTAL NUCLEATED RBC: 0 K/CU MM
TROPONIN T: <0.01 NG/ML
VANCOMYCIN RANDOM: 4.6 UG/ML
WBC # BLD: 4.4 K/CU MM (ref 4–10.5)

## 2019-01-03 PROCEDURE — 6370000000 HC RX 637 (ALT 250 FOR IP)

## 2019-01-03 PROCEDURE — 6360000002 HC RX W HCPCS: Performed by: INTERNAL MEDICINE

## 2019-01-03 PROCEDURE — 94761 N-INVAS EAR/PLS OXIMETRY MLT: CPT

## 2019-01-03 PROCEDURE — 2500000003 HC RX 250 WO HCPCS: Performed by: INTERNAL MEDICINE

## 2019-01-03 PROCEDURE — 85025 COMPLETE CBC W/AUTO DIFF WBC: CPT

## 2019-01-03 PROCEDURE — 36415 COLL VENOUS BLD VENIPUNCTURE: CPT

## 2019-01-03 PROCEDURE — 80048 BASIC METABOLIC PNL TOTAL CA: CPT

## 2019-01-03 PROCEDURE — 80202 ASSAY OF VANCOMYCIN: CPT

## 2019-01-03 PROCEDURE — 2580000003 HC RX 258: Performed by: INTERNAL MEDICINE

## 2019-01-03 PROCEDURE — 1200000000 HC SEMI PRIVATE

## 2019-01-03 PROCEDURE — 93005 ELECTROCARDIOGRAM TRACING: CPT | Performed by: INTERNAL MEDICINE

## 2019-01-03 PROCEDURE — 93005 ELECTROCARDIOGRAM TRACING: CPT | Performed by: HOSPITALIST

## 2019-01-03 PROCEDURE — 84484 ASSAY OF TROPONIN QUANT: CPT

## 2019-01-03 PROCEDURE — 6370000000 HC RX 637 (ALT 250 FOR IP): Performed by: INTERNAL MEDICINE

## 2019-01-03 RX ORDER — ASPIRIN 81 MG/1
TABLET, CHEWABLE ORAL
Status: COMPLETED
Start: 2019-01-03 | End: 2019-01-03

## 2019-01-03 RX ORDER — NITROGLYCERIN 0.4 MG/1
0.4 TABLET SUBLINGUAL EVERY 5 MIN PRN
Status: DISCONTINUED | OUTPATIENT
Start: 2019-01-03 | End: 2019-01-04

## 2019-01-03 RX ORDER — ASPIRIN 325 MG
325 TABLET ORAL DAILY
Status: DISCONTINUED | OUTPATIENT
Start: 2019-01-03 | End: 2019-01-04

## 2019-01-03 RX ORDER — NITROGLYCERIN 0.4 MG/1
TABLET SUBLINGUAL
Status: COMPLETED
Start: 2019-01-03 | End: 2019-01-03

## 2019-01-03 RX ORDER — TRAMADOL HYDROCHLORIDE 50 MG/1
50 TABLET ORAL EVERY 6 HOURS PRN
Status: DISCONTINUED | OUTPATIENT
Start: 2019-01-03 | End: 2019-01-05 | Stop reason: HOSPADM

## 2019-01-03 RX ORDER — VANCOMYCIN HYDROCHLORIDE 1 G/200ML
1000 INJECTION, SOLUTION INTRAVENOUS ONCE
Status: COMPLETED | OUTPATIENT
Start: 2019-01-03 | End: 2019-01-03

## 2019-01-03 RX ORDER — ASPIRIN 81 MG/1
TABLET, CHEWABLE ORAL
Status: DISPENSED
Start: 2019-01-03 | End: 2019-01-04

## 2019-01-03 RX ADMIN — ASPIRIN 81 MG 324 MG: 81 TABLET ORAL at 20:09

## 2019-01-03 RX ADMIN — VANCOMYCIN HYDROCHLORIDE 1000 MG: 1 INJECTION, SOLUTION INTRAVENOUS at 16:37

## 2019-01-03 RX ADMIN — CLINDAMYCIN PHOSPHATE 600 MG: 150 INJECTION, SOLUTION, CONCENTRATE INTRAVENOUS at 06:52

## 2019-01-03 RX ADMIN — TRAMADOL HYDROCHLORIDE 50 MG: 50 TABLET, FILM COATED ORAL at 09:54

## 2019-01-03 RX ADMIN — TRAMADOL HYDROCHLORIDE 50 MG: 50 TABLET, FILM COATED ORAL at 22:14

## 2019-01-03 RX ADMIN — SODIUM CHLORIDE, PRESERVATIVE FREE 10 ML: 5 INJECTION INTRAVENOUS at 09:49

## 2019-01-03 RX ADMIN — NITROGLYCERIN 0.4 MG: 0.4 TABLET SUBLINGUAL at 20:11

## 2019-01-03 RX ADMIN — CLINDAMYCIN PHOSPHATE 600 MG: 150 INJECTION, SOLUTION, CONCENTRATE INTRAVENOUS at 18:05

## 2019-01-03 RX ADMIN — NITROGLYCERIN 0.4 MG: 0.4 TABLET SUBLINGUAL at 20:23

## 2019-01-03 RX ADMIN — NITROGLYCERIN 0.4 MG: 0.4 TABLET SUBLINGUAL at 20:03

## 2019-01-03 RX ADMIN — SODIUM CHLORIDE, PRESERVATIVE FREE 10 ML: 5 INJECTION INTRAVENOUS at 20:23

## 2019-01-03 RX ADMIN — RIVAROXABAN 15 MG: 15 TABLET, FILM COATED ORAL at 09:48

## 2019-01-03 ASSESSMENT — PAIN SCALES - GENERAL
PAINLEVEL_OUTOF10: 6
PAINLEVEL_OUTOF10: 7
PAINLEVEL_OUTOF10: 3
PAINLEVEL_OUTOF10: 8

## 2019-01-03 ASSESSMENT — PAIN DESCRIPTION - PAIN TYPE
TYPE: ACUTE PAIN
TYPE: ACUTE PAIN

## 2019-01-03 ASSESSMENT — PAIN DESCRIPTION - ORIENTATION
ORIENTATION: LEFT
ORIENTATION: RIGHT

## 2019-01-03 ASSESSMENT — PAIN DESCRIPTION - LOCATION
LOCATION: CHEST
LOCATION: LEG

## 2019-01-03 ASSESSMENT — PAIN DESCRIPTION - PROGRESSION
CLINICAL_PROGRESSION: GRADUALLY WORSENING
CLINICAL_PROGRESSION: GRADUALLY WORSENING

## 2019-01-04 LAB
ANION GAP SERPL CALCULATED.3IONS-SCNC: 8 MMOL/L (ref 4–16)
BASOPHILS ABSOLUTE: 0 K/CU MM
BASOPHILS RELATIVE PERCENT: 0.6 % (ref 0–1)
BUN BLDV-MCNC: 26 MG/DL (ref 6–23)
CALCIUM SERPL-MCNC: 8.8 MG/DL (ref 8.3–10.6)
CHLORIDE BLD-SCNC: 102 MMOL/L (ref 99–110)
CO2: 31 MMOL/L (ref 21–32)
CREAT SERPL-MCNC: 0.8 MG/DL (ref 0.6–1.1)
DIFFERENTIAL TYPE: ABNORMAL
EKG ATRIAL RATE: 86 BPM
EKG ATRIAL RATE: 86 BPM
EKG DIAGNOSIS: NORMAL
EKG DIAGNOSIS: NORMAL
EKG P AXIS: 54 DEGREES
EKG P AXIS: 64 DEGREES
EKG P-R INTERVAL: 152 MS
EKG P-R INTERVAL: 164 MS
EKG Q-T INTERVAL: 392 MS
EKG Q-T INTERVAL: 394 MS
EKG QRS DURATION: 76 MS
EKG QRS DURATION: 76 MS
EKG QTC CALCULATION (BAZETT): 469 MS
EKG QTC CALCULATION (BAZETT): 471 MS
EKG R AXIS: 28 DEGREES
EKG R AXIS: 42 DEGREES
EKG T AXIS: 54 DEGREES
EKG T AXIS: 61 DEGREES
EKG VENTRICULAR RATE: 86 BPM
EKG VENTRICULAR RATE: 86 BPM
EOSINOPHILS ABSOLUTE: 0.3 K/CU MM
EOSINOPHILS RELATIVE PERCENT: 6.5 % (ref 0–3)
GFR AFRICAN AMERICAN: >60 ML/MIN/1.73M2
GFR NON-AFRICAN AMERICAN: >60 ML/MIN/1.73M2
GLUCOSE BLD-MCNC: 83 MG/DL (ref 70–99)
HCT VFR BLD CALC: 38.4 % (ref 37–47)
HEMOGLOBIN: 11.6 GM/DL (ref 12.5–16)
IMMATURE NEUTROPHIL %: 0.2 % (ref 0–0.43)
LYMPHOCYTES ABSOLUTE: 1 K/CU MM
LYMPHOCYTES RELATIVE PERCENT: 20.9 % (ref 24–44)
MCH RBC QN AUTO: 27.6 PG (ref 27–31)
MCHC RBC AUTO-ENTMCNC: 30.2 % (ref 32–36)
MCV RBC AUTO: 91.4 FL (ref 78–100)
MONOCYTES ABSOLUTE: 0.4 K/CU MM
MONOCYTES RELATIVE PERCENT: 9.2 % (ref 0–4)
NUCLEATED RBC %: 0 %
PDW BLD-RTO: 14.4 % (ref 11.7–14.9)
PLATELET # BLD: 152 K/CU MM (ref 140–440)
PMV BLD AUTO: 11.4 FL (ref 7.5–11.1)
POTASSIUM SERPL-SCNC: 4.5 MMOL/L (ref 3.5–5.1)
RBC # BLD: 4.2 M/CU MM (ref 4.2–5.4)
SEGMENTED NEUTROPHILS ABSOLUTE COUNT: 3 K/CU MM
SEGMENTED NEUTROPHILS RELATIVE PERCENT: 62.6 % (ref 36–66)
SODIUM BLD-SCNC: 141 MMOL/L (ref 135–145)
TOTAL IMMATURE NEUTOROPHIL: 0.01 K/CU MM
TOTAL NUCLEATED RBC: 0 K/CU MM
VANCOMYCIN TROUGH: 9.1 UG/ML (ref 10–20)
WBC # BLD: 4.8 K/CU MM (ref 4–10.5)

## 2019-01-04 PROCEDURE — G8978 MOBILITY CURRENT STATUS: HCPCS

## 2019-01-04 PROCEDURE — 6370000000 HC RX 637 (ALT 250 FOR IP): Performed by: INTERNAL MEDICINE

## 2019-01-04 PROCEDURE — 6360000002 HC RX W HCPCS: Performed by: INTERNAL MEDICINE

## 2019-01-04 PROCEDURE — 80048 BASIC METABOLIC PNL TOTAL CA: CPT

## 2019-01-04 PROCEDURE — 1200000000 HC SEMI PRIVATE

## 2019-01-04 PROCEDURE — 2500000003 HC RX 250 WO HCPCS: Performed by: INTERNAL MEDICINE

## 2019-01-04 PROCEDURE — 97116 GAIT TRAINING THERAPY: CPT

## 2019-01-04 PROCEDURE — 93010 ELECTROCARDIOGRAM REPORT: CPT | Performed by: INTERNAL MEDICINE

## 2019-01-04 PROCEDURE — G8979 MOBILITY GOAL STATUS: HCPCS

## 2019-01-04 PROCEDURE — 80202 ASSAY OF VANCOMYCIN: CPT

## 2019-01-04 PROCEDURE — 85025 COMPLETE CBC W/AUTO DIFF WBC: CPT

## 2019-01-04 PROCEDURE — 97530 THERAPEUTIC ACTIVITIES: CPT

## 2019-01-04 PROCEDURE — 2580000003 HC RX 258: Performed by: INTERNAL MEDICINE

## 2019-01-04 PROCEDURE — 36415 COLL VENOUS BLD VENIPUNCTURE: CPT

## 2019-01-04 PROCEDURE — 97163 PT EVAL HIGH COMPLEX 45 MIN: CPT

## 2019-01-04 RX ORDER — VANCOMYCIN HYDROCHLORIDE 1 G/200ML
1000 INJECTION, SOLUTION INTRAVENOUS ONCE
Status: COMPLETED | OUTPATIENT
Start: 2019-01-04 | End: 2019-01-04

## 2019-01-04 RX ORDER — NITROGLYCERIN 0.4 MG/1
0.4 TABLET SUBLINGUAL EVERY 5 MIN PRN
Status: DISCONTINUED | OUTPATIENT
Start: 2019-01-03 | End: 2019-01-05 | Stop reason: HOSPADM

## 2019-01-04 RX ORDER — ASPIRIN 81 MG/1
324 TABLET, CHEWABLE ORAL ONCE
Status: DISCONTINUED | OUTPATIENT
Start: 2019-01-03 | End: 2019-01-05 | Stop reason: HOSPADM

## 2019-01-04 RX ORDER — ASPIRIN 325 MG
325 TABLET ORAL ONCE
Status: DISCONTINUED | OUTPATIENT
Start: 2019-01-03 | End: 2019-01-05 | Stop reason: HOSPADM

## 2019-01-04 RX ADMIN — CLINDAMYCIN PHOSPHATE 600 MG: 150 INJECTION, SOLUTION, CONCENTRATE INTRAVENOUS at 17:40

## 2019-01-04 RX ADMIN — TRAMADOL HYDROCHLORIDE 50 MG: 50 TABLET, FILM COATED ORAL at 05:46

## 2019-01-04 RX ADMIN — SODIUM CHLORIDE, PRESERVATIVE FREE 10 ML: 5 INJECTION INTRAVENOUS at 21:35

## 2019-01-04 RX ADMIN — RIVAROXABAN 15 MG: 15 TABLET, FILM COATED ORAL at 08:49

## 2019-01-04 RX ADMIN — CLINDAMYCIN PHOSPHATE 600 MG: 150 INJECTION, SOLUTION, CONCENTRATE INTRAVENOUS at 08:50

## 2019-01-04 RX ADMIN — TRAMADOL HYDROCHLORIDE 50 MG: 50 TABLET, FILM COATED ORAL at 21:16

## 2019-01-04 RX ADMIN — SODIUM CHLORIDE, PRESERVATIVE FREE 10 ML: 5 INJECTION INTRAVENOUS at 08:49

## 2019-01-04 RX ADMIN — CLINDAMYCIN PHOSPHATE 600 MG: 150 INJECTION, SOLUTION, CONCENTRATE INTRAVENOUS at 01:39

## 2019-01-04 RX ADMIN — VANCOMYCIN HYDROCHLORIDE 1000 MG: 1 INJECTION, SOLUTION INTRAVENOUS at 14:59

## 2019-01-04 ASSESSMENT — PAIN DESCRIPTION - ORIENTATION
ORIENTATION: LEFT
ORIENTATION: LEFT

## 2019-01-04 ASSESSMENT — PAIN DESCRIPTION - LOCATION
LOCATION: LEG;SHOULDER
LOCATION: LEG
LOCATION: LEG

## 2019-01-04 ASSESSMENT — PAIN DESCRIPTION - FREQUENCY: FREQUENCY: INTERMITTENT

## 2019-01-04 ASSESSMENT — PAIN SCALES - GENERAL
PAINLEVEL_OUTOF10: 6
PAINLEVEL_OUTOF10: 7
PAINLEVEL_OUTOF10: 9

## 2019-01-04 ASSESSMENT — PAIN DESCRIPTION - PROGRESSION
CLINICAL_PROGRESSION: GRADUALLY WORSENING

## 2019-01-04 ASSESSMENT — PAIN DESCRIPTION - PAIN TYPE
TYPE: ACUTE PAIN

## 2019-01-04 ASSESSMENT — PAIN DESCRIPTION - DESCRIPTORS: DESCRIPTORS: ACHING

## 2019-01-05 VITALS
HEIGHT: 64 IN | OXYGEN SATURATION: 98 % | RESPIRATION RATE: 16 BRPM | DIASTOLIC BLOOD PRESSURE: 60 MMHG | HEART RATE: 63 BPM | SYSTOLIC BLOOD PRESSURE: 127 MMHG | BODY MASS INDEX: 16.34 KG/M2 | WEIGHT: 95.7 LBS | TEMPERATURE: 97.4 F

## 2019-01-05 PROBLEM — R53.81 PHYSICAL DECONDITIONING: Status: ACTIVE | Noted: 2019-01-05

## 2019-01-05 LAB
ANION GAP SERPL CALCULATED.3IONS-SCNC: 8 MMOL/L (ref 4–16)
BASOPHILS ABSOLUTE: 0 K/CU MM
BASOPHILS RELATIVE PERCENT: 0.9 % (ref 0–1)
BUN BLDV-MCNC: 23 MG/DL (ref 6–23)
CALCIUM SERPL-MCNC: 8.6 MG/DL (ref 8.3–10.6)
CHLORIDE BLD-SCNC: 101 MMOL/L (ref 99–110)
CO2: 31 MMOL/L (ref 21–32)
CREAT SERPL-MCNC: 1 MG/DL (ref 0.6–1.1)
DIFFERENTIAL TYPE: ABNORMAL
EOSINOPHILS ABSOLUTE: 0.5 K/CU MM
EOSINOPHILS RELATIVE PERCENT: 10.9 % (ref 0–3)
GFR AFRICAN AMERICAN: >60 ML/MIN/1.73M2
GFR NON-AFRICAN AMERICAN: 52 ML/MIN/1.73M2
GLUCOSE BLD-MCNC: 87 MG/DL (ref 70–99)
HCT VFR BLD CALC: 37.3 % (ref 37–47)
HEMOGLOBIN: 11.8 GM/DL (ref 12.5–16)
IMMATURE NEUTROPHIL %: 0.2 % (ref 0–0.43)
LYMPHOCYTES ABSOLUTE: 1.1 K/CU MM
LYMPHOCYTES RELATIVE PERCENT: 26 % (ref 24–44)
MCH RBC QN AUTO: 28.6 PG (ref 27–31)
MCHC RBC AUTO-ENTMCNC: 31.6 % (ref 32–36)
MCV RBC AUTO: 90.3 FL (ref 78–100)
MONOCYTES ABSOLUTE: 0.5 K/CU MM
MONOCYTES RELATIVE PERCENT: 10.4 % (ref 0–4)
NUCLEATED RBC %: 0 %
PDW BLD-RTO: 14.4 % (ref 11.7–14.9)
PLATELET # BLD: 148 K/CU MM (ref 140–440)
PMV BLD AUTO: 11.5 FL (ref 7.5–11.1)
POTASSIUM SERPL-SCNC: 4.9 MMOL/L (ref 3.5–5.1)
RBC # BLD: 4.13 M/CU MM (ref 4.2–5.4)
SEGMENTED NEUTROPHILS ABSOLUTE COUNT: 2.2 K/CU MM
SEGMENTED NEUTROPHILS RELATIVE PERCENT: 51.6 % (ref 36–66)
SODIUM BLD-SCNC: 140 MMOL/L (ref 135–145)
TOTAL IMMATURE NEUTOROPHIL: 0.01 K/CU MM
TOTAL NUCLEATED RBC: 0 K/CU MM
WBC # BLD: 4.3 K/CU MM (ref 4–10.5)

## 2019-01-05 PROCEDURE — 2500000003 HC RX 250 WO HCPCS: Performed by: INTERNAL MEDICINE

## 2019-01-05 PROCEDURE — 80202 ASSAY OF VANCOMYCIN: CPT

## 2019-01-05 PROCEDURE — 6370000000 HC RX 637 (ALT 250 FOR IP): Performed by: INTERNAL MEDICINE

## 2019-01-05 PROCEDURE — 36415 COLL VENOUS BLD VENIPUNCTURE: CPT

## 2019-01-05 PROCEDURE — 80048 BASIC METABOLIC PNL TOTAL CA: CPT

## 2019-01-05 PROCEDURE — 2580000003 HC RX 258: Performed by: INTERNAL MEDICINE

## 2019-01-05 PROCEDURE — 85025 COMPLETE CBC W/AUTO DIFF WBC: CPT

## 2019-01-05 RX ORDER — CLINDAMYCIN HYDROCHLORIDE 300 MG/1
300 CAPSULE ORAL 2 TIMES DAILY
Qty: 14 CAPSULE | Refills: 0 | Status: SHIPPED | OUTPATIENT
Start: 2019-01-05 | End: 2019-01-12

## 2019-01-05 RX ADMIN — CLINDAMYCIN PHOSPHATE 600 MG: 150 INJECTION, SOLUTION, CONCENTRATE INTRAVENOUS at 02:57

## 2019-01-05 RX ADMIN — TRAMADOL HYDROCHLORIDE 50 MG: 50 TABLET, FILM COATED ORAL at 07:56

## 2019-01-05 RX ADMIN — RIVAROXABAN 15 MG: 15 TABLET, FILM COATED ORAL at 10:21

## 2019-01-05 ASSESSMENT — PAIN SCALES - GENERAL
PAINLEVEL_OUTOF10: 3
PAINLEVEL_OUTOF10: 9
PAINLEVEL_OUTOF10: 9

## 2019-01-05 ASSESSMENT — PAIN DESCRIPTION - PAIN TYPE
TYPE: ACUTE PAIN
TYPE: ACUTE PAIN

## 2019-01-05 ASSESSMENT — PAIN DESCRIPTION - PROGRESSION
CLINICAL_PROGRESSION: GRADUALLY WORSENING
CLINICAL_PROGRESSION: NOT CHANGED

## 2019-01-05 ASSESSMENT — PAIN DESCRIPTION - ORIENTATION
ORIENTATION: LEFT
ORIENTATION: LEFT

## 2019-01-05 ASSESSMENT — PAIN DESCRIPTION - LOCATION
LOCATION: LEG
LOCATION: LEG

## 2019-01-05 ASSESSMENT — PAIN DESCRIPTION - FREQUENCY
FREQUENCY: CONTINUOUS
FREQUENCY: CONTINUOUS

## 2019-01-05 ASSESSMENT — PAIN DESCRIPTION - ONSET
ONSET: ON-GOING
ONSET: ON-GOING

## 2019-01-05 ASSESSMENT — PAIN DESCRIPTION - DESCRIPTORS
DESCRIPTORS: ACHING
DESCRIPTORS: ACHING

## 2019-01-06 LAB
CULTURE: NORMAL
Lab: NORMAL
REPORT STATUS: NORMAL
SPECIMEN: NORMAL

## 2019-01-22 ENCOUNTER — OFFICE VISIT (OUTPATIENT)
Dept: INTERNAL MEDICINE CLINIC | Age: 84
End: 2019-01-22
Payer: MEDICARE

## 2019-01-22 VITALS
BODY MASS INDEX: 15.43 KG/M2 | HEART RATE: 83 BPM | RESPIRATION RATE: 14 BRPM | DIASTOLIC BLOOD PRESSURE: 73 MMHG | HEIGHT: 66 IN | SYSTOLIC BLOOD PRESSURE: 127 MMHG | WEIGHT: 96 LBS | OXYGEN SATURATION: 99 %

## 2019-01-22 DIAGNOSIS — G31.84 MCI (MILD COGNITIVE IMPAIRMENT): ICD-10-CM

## 2019-01-22 DIAGNOSIS — I35.0 AORTIC STENOSIS, SEVERE: ICD-10-CM

## 2019-01-22 DIAGNOSIS — E03.9 ACQUIRED HYPOTHYROIDISM: ICD-10-CM

## 2019-01-22 DIAGNOSIS — I87.2 CHRONIC VENOUS STASIS DERMATITIS: ICD-10-CM

## 2019-01-22 DIAGNOSIS — Z86.718 HISTORY OF RECURRENT DEEP VEIN THROMBOSIS (DVT): Primary | ICD-10-CM

## 2019-01-22 DIAGNOSIS — I87.8 CHRONIC VENOUS STASIS: ICD-10-CM

## 2019-01-22 DIAGNOSIS — R63.4 WEIGHT LOSS: ICD-10-CM

## 2019-01-22 DIAGNOSIS — M81.0 AGE-RELATED OSTEOPOROSIS WITHOUT CURRENT PATHOLOGICAL FRACTURE: ICD-10-CM

## 2019-01-22 DIAGNOSIS — Z79.01 CHRONIC ANTICOAGULATION: ICD-10-CM

## 2019-01-22 PROCEDURE — 1123F ACP DISCUSS/DSCN MKR DOCD: CPT | Performed by: INTERNAL MEDICINE

## 2019-01-22 PROCEDURE — 1090F PRES/ABSN URINE INCON ASSESS: CPT | Performed by: INTERNAL MEDICINE

## 2019-01-22 PROCEDURE — G8427 DOCREV CUR MEDS BY ELIG CLIN: HCPCS | Performed by: INTERNAL MEDICINE

## 2019-01-22 PROCEDURE — G8484 FLU IMMUNIZE NO ADMIN: HCPCS | Performed by: INTERNAL MEDICINE

## 2019-01-22 PROCEDURE — 1111F DSCHRG MED/CURRENT MED MERGE: CPT | Performed by: INTERNAL MEDICINE

## 2019-01-22 PROCEDURE — G8419 CALC BMI OUT NRM PARAM NOF/U: HCPCS | Performed by: INTERNAL MEDICINE

## 2019-01-22 PROCEDURE — 4040F PNEUMOC VAC/ADMIN/RCVD: CPT | Performed by: INTERNAL MEDICINE

## 2019-01-22 PROCEDURE — 99214 OFFICE O/P EST MOD 30 MIN: CPT | Performed by: INTERNAL MEDICINE

## 2019-01-22 PROCEDURE — 1101F PT FALLS ASSESS-DOCD LE1/YR: CPT | Performed by: INTERNAL MEDICINE

## 2019-01-22 PROCEDURE — 1036F TOBACCO NON-USER: CPT | Performed by: INTERNAL MEDICINE

## 2019-01-22 RX ORDER — BACITRACIN ZINC AND POLYMYXIN B SULFATE 500; 1000 [USP'U]/G; [USP'U]/G
OINTMENT TOPICAL
Qty: 28.35 G | Refills: 3 | Status: SHIPPED | OUTPATIENT
Start: 2019-01-22 | End: 2019-03-19 | Stop reason: SDUPTHER

## 2019-01-22 RX ORDER — LEVOTHYROXINE SODIUM 0.03 MG/1
25 TABLET ORAL DAILY
Qty: 30 TABLET | Refills: 5 | Status: SHIPPED | OUTPATIENT
Start: 2019-01-22 | End: 2019-08-20

## 2019-01-22 RX ORDER — TRAMADOL HYDROCHLORIDE 50 MG/1
50 TABLET ORAL EVERY 6 HOURS PRN
COMMUNITY
End: 2019-08-20

## 2019-01-22 RX ORDER — TRAMADOL HYDROCHLORIDE 50 MG/1
50 TABLET ORAL EVERY 6 HOURS PRN
Qty: 28 TABLET | Refills: 0 | Status: CANCELLED | OUTPATIENT
Start: 2019-01-22 | End: 2019-02-05

## 2019-01-23 ENCOUNTER — TELEPHONE (OUTPATIENT)
Dept: INTERNAL MEDICINE CLINIC | Age: 84
End: 2019-01-23

## 2019-01-23 PROBLEM — I87.2 CHRONIC VENOUS STASIS DERMATITIS: Status: ACTIVE | Noted: 2019-01-23

## 2019-01-24 ENCOUNTER — TELEPHONE (OUTPATIENT)
Dept: INTERNAL MEDICINE CLINIC | Age: 84
End: 2019-01-24

## 2019-01-25 ENCOUNTER — TELEPHONE (OUTPATIENT)
Dept: CARDIOLOGY CLINIC | Age: 84
End: 2019-01-25

## 2019-03-19 ENCOUNTER — OFFICE VISIT (OUTPATIENT)
Dept: INTERNAL MEDICINE CLINIC | Age: 84
End: 2019-03-19
Payer: MEDICARE

## 2019-03-19 VITALS
OXYGEN SATURATION: 99 % | DIASTOLIC BLOOD PRESSURE: 64 MMHG | WEIGHT: 90.5 LBS | SYSTOLIC BLOOD PRESSURE: 102 MMHG | BODY MASS INDEX: 14.61 KG/M2 | HEART RATE: 75 BPM

## 2019-03-19 DIAGNOSIS — I87.2 CHRONIC VENOUS STASIS DERMATITIS: ICD-10-CM

## 2019-03-19 DIAGNOSIS — Z91.81 AT HIGH RISK FOR FALLS: ICD-10-CM

## 2019-03-19 DIAGNOSIS — M81.0 AGE-RELATED OSTEOPOROSIS WITHOUT CURRENT PATHOLOGICAL FRACTURE: ICD-10-CM

## 2019-03-19 DIAGNOSIS — N93.9 VAGINAL SPOTTING: ICD-10-CM

## 2019-03-19 DIAGNOSIS — Z86.718 HISTORY OF RECURRENT DEEP VEIN THROMBOSIS (DVT): ICD-10-CM

## 2019-03-19 DIAGNOSIS — R63.4 WEIGHT LOSS: Primary | ICD-10-CM

## 2019-03-19 DIAGNOSIS — E03.9 ACQUIRED HYPOTHYROIDISM: ICD-10-CM

## 2019-03-19 PROBLEM — I87.8 CHRONIC VENOUS STASIS: Status: ACTIVE | Noted: 2019-03-19

## 2019-03-19 PROCEDURE — 99213 OFFICE O/P EST LOW 20 MIN: CPT | Performed by: INTERNAL MEDICINE

## 2019-03-19 PROCEDURE — 1123F ACP DISCUSS/DSCN MKR DOCD: CPT | Performed by: INTERNAL MEDICINE

## 2019-03-19 PROCEDURE — 4040F PNEUMOC VAC/ADMIN/RCVD: CPT | Performed by: INTERNAL MEDICINE

## 2019-03-19 PROCEDURE — 1036F TOBACCO NON-USER: CPT | Performed by: INTERNAL MEDICINE

## 2019-03-19 PROCEDURE — 1101F PT FALLS ASSESS-DOCD LE1/YR: CPT | Performed by: INTERNAL MEDICINE

## 2019-03-19 PROCEDURE — G8427 DOCREV CUR MEDS BY ELIG CLIN: HCPCS | Performed by: INTERNAL MEDICINE

## 2019-03-19 PROCEDURE — G8419 CALC BMI OUT NRM PARAM NOF/U: HCPCS | Performed by: INTERNAL MEDICINE

## 2019-03-19 PROCEDURE — 1090F PRES/ABSN URINE INCON ASSESS: CPT | Performed by: INTERNAL MEDICINE

## 2019-03-19 PROCEDURE — G8484 FLU IMMUNIZE NO ADMIN: HCPCS | Performed by: INTERNAL MEDICINE

## 2019-03-19 RX ORDER — BACITRACIN ZINC AND POLYMYXIN B SULFATE 500; 1000 [USP'U]/G; [USP'U]/G
OINTMENT TOPICAL
Qty: 28.35 G | Refills: 3 | Status: SHIPPED | OUTPATIENT
Start: 2019-03-19 | End: 2019-08-20

## 2019-03-19 ASSESSMENT — PATIENT HEALTH QUESTIONNAIRE - PHQ9
SUM OF ALL RESPONSES TO PHQ9 QUESTIONS 1 & 2: 0
SUM OF ALL RESPONSES TO PHQ QUESTIONS 1-9: 0
SUM OF ALL RESPONSES TO PHQ QUESTIONS 1-9: 0
2. FEELING DOWN, DEPRESSED OR HOPELESS: 0
1. LITTLE INTEREST OR PLEASURE IN DOING THINGS: 0

## 2019-03-27 ENCOUNTER — HOSPITAL ENCOUNTER (OUTPATIENT)
Age: 84
Setting detail: SPECIMEN
Discharge: HOME | End: 2019-03-27
Payer: MEDICARE

## 2019-05-02 ENCOUNTER — OFFICE VISIT (OUTPATIENT)
Dept: FAMILY MEDICINE CLINIC | Age: 84
End: 2019-05-02
Payer: MEDICARE

## 2019-05-02 VITALS
OXYGEN SATURATION: 94 % | HEART RATE: 89 BPM | SYSTOLIC BLOOD PRESSURE: 130 MMHG | BODY MASS INDEX: 14.56 KG/M2 | DIASTOLIC BLOOD PRESSURE: 70 MMHG | WEIGHT: 90.2 LBS

## 2019-05-02 DIAGNOSIS — E03.9 ACQUIRED HYPOTHYROIDISM: ICD-10-CM

## 2019-05-02 DIAGNOSIS — L30.9 DERMATITIS: ICD-10-CM

## 2019-05-02 DIAGNOSIS — G31.84 MCI (MILD COGNITIVE IMPAIRMENT): ICD-10-CM

## 2019-05-02 DIAGNOSIS — M79.605 LEFT LEG PAIN: Primary | ICD-10-CM

## 2019-05-02 DIAGNOSIS — I35.0 NONRHEUMATIC AORTIC VALVE STENOSIS: ICD-10-CM

## 2019-05-02 DIAGNOSIS — I73.9 PAD (PERIPHERAL ARTERY DISEASE) (HCC): ICD-10-CM

## 2019-05-02 DIAGNOSIS — Z79.01 CHRONIC ANTICOAGULATION: ICD-10-CM

## 2019-05-02 DIAGNOSIS — Z86.718 HISTORY OF RECURRENT DEEP VEIN THROMBOSIS (DVT): ICD-10-CM

## 2019-05-02 DIAGNOSIS — R53.81 PHYSICAL DECONDITIONING: ICD-10-CM

## 2019-05-02 DIAGNOSIS — M81.0 AGE-RELATED OSTEOPOROSIS WITHOUT CURRENT PATHOLOGICAL FRACTURE: ICD-10-CM

## 2019-05-02 PROBLEM — E43 SEVERE MALNUTRITION (HCC): Chronic | Status: RESOLVED | Noted: 2019-01-02 | Resolved: 2019-05-02

## 2019-05-02 LAB
A/G RATIO: 1.4 (ref 1.1–2.2)
ALBUMIN SERPL-MCNC: 4.2 G/DL (ref 3.4–5)
ALP BLD-CCNC: 73 U/L (ref 40–129)
ALT SERPL-CCNC: 14 U/L (ref 10–40)
ANION GAP SERPL CALCULATED.3IONS-SCNC: 13 MMOL/L (ref 3–16)
AST SERPL-CCNC: 17 U/L (ref 15–37)
BILIRUB SERPL-MCNC: 0.4 MG/DL (ref 0–1)
BUN BLDV-MCNC: 39 MG/DL (ref 7–20)
CALCIUM SERPL-MCNC: 9.7 MG/DL (ref 8.3–10.6)
CHLORIDE BLD-SCNC: 103 MMOL/L (ref 99–110)
CO2: 26 MMOL/L (ref 21–32)
CREAT SERPL-MCNC: 0.8 MG/DL (ref 0.6–1.2)
GFR AFRICAN AMERICAN: >60
GFR NON-AFRICAN AMERICAN: >60
GLOBULIN: 3 G/DL
GLUCOSE BLD-MCNC: 93 MG/DL (ref 70–99)
HCT VFR BLD CALC: 36.8 % (ref 36–48)
HEMOGLOBIN: 11.7 G/DL (ref 12–16)
MCH RBC QN AUTO: 26.5 PG (ref 26–34)
MCHC RBC AUTO-ENTMCNC: 31.7 G/DL (ref 31–36)
MCV RBC AUTO: 83.4 FL (ref 80–100)
PDW BLD-RTO: 14.5 % (ref 12.4–15.4)
PLATELET # BLD: 183 K/UL (ref 135–450)
PMV BLD AUTO: 10.6 FL (ref 5–10.5)
POTASSIUM SERPL-SCNC: 4.7 MMOL/L (ref 3.5–5.1)
RBC # BLD: 4.41 M/UL (ref 4–5.2)
SODIUM BLD-SCNC: 142 MMOL/L (ref 136–145)
T4 FREE: 3.7 NG/DL (ref 0.9–1.8)
TOTAL PROTEIN: 7.2 G/DL (ref 6.4–8.2)
TSH SERPL DL<=0.05 MIU/L-ACNC: 0.02 UIU/ML (ref 0.27–4.2)
WBC # BLD: 5.4 K/UL (ref 4–11)

## 2019-05-02 PROCEDURE — 99214 OFFICE O/P EST MOD 30 MIN: CPT | Performed by: FAMILY MEDICINE

## 2019-05-02 PROCEDURE — G8419 CALC BMI OUT NRM PARAM NOF/U: HCPCS | Performed by: FAMILY MEDICINE

## 2019-05-02 PROCEDURE — 1090F PRES/ABSN URINE INCON ASSESS: CPT | Performed by: FAMILY MEDICINE

## 2019-05-02 PROCEDURE — G8427 DOCREV CUR MEDS BY ELIG CLIN: HCPCS | Performed by: FAMILY MEDICINE

## 2019-05-02 PROCEDURE — 36415 COLL VENOUS BLD VENIPUNCTURE: CPT | Performed by: FAMILY MEDICINE

## 2019-05-02 PROCEDURE — 1036F TOBACCO NON-USER: CPT | Performed by: FAMILY MEDICINE

## 2019-05-02 PROCEDURE — 4040F PNEUMOC VAC/ADMIN/RCVD: CPT | Performed by: FAMILY MEDICINE

## 2019-05-02 PROCEDURE — 1123F ACP DISCUSS/DSCN MKR DOCD: CPT | Performed by: FAMILY MEDICINE

## 2019-05-02 ASSESSMENT — PATIENT HEALTH QUESTIONNAIRE - PHQ9
2. FEELING DOWN, DEPRESSED OR HOPELESS: 0
1. LITTLE INTEREST OR PLEASURE IN DOING THINGS: 0
SUM OF ALL RESPONSES TO PHQ9 QUESTIONS 1 & 2: 0
SUM OF ALL RESPONSES TO PHQ QUESTIONS 1-9: 0
SUM OF ALL RESPONSES TO PHQ QUESTIONS 1-9: 0

## 2019-05-02 NOTE — PROGRESS NOTES
Chief Complaint   Patient presents with   Gely Gordon Establish Care     patient is here to establish care     Hypothyroidism     patient was on medication but stopped taking it    Foot Pain     patient has left foot pain and leg pain        [place] NARRATIVE:    Patient is NEW to this provider today. Was seeing Dr. King Prader seen in March. She previously lived in a [facility] in [facility] but has recently moved to [facility] locally. She is accompanied today by her friend Roxann Casey who is a good friend and part-time caregiver. H/O severe malnutrition. Patient is on Xarelto for either DVT or history of nonrheumatic aortic valve stenosis, I can't find any A. fib history or anything else. There are extensive old records in the chart including those available through care everywhere. I was not able to review all of them in detail today. She has hypothyroidism but has stopped that medication because she did not know what it was for. Their primary complaint today is of left leg pain. She had a fall back in the end of 2018, thoroughly evaluated in [place], chart from that is reviewed and there was no evidence of fracture. She was admitted to the hospital in January with cellulitis of this left lower leg. This record is also available in our chart. She was discharged with referral into into rehab before going onto the [facility]. Regarding the foot pain she appears to have medial tibial pain with prolonged standing. Skin is very discolored in this area and rough and tender at times. Foot is also discolored around the metatarsal heads superiorly. They state it looks much better than it did when she had cellulitis. She denies that she has significant episodes of swelling of bilateral lower extremities. Review of the charts shows quite a significant history of peripheral arterial disease, patient does not recall that and I do not see evidence of prior intervention.     Patient has some memory issues as well and is probably not good as a provider medical history. Extensive surgical history as noted below. There is also mention in an old chart of prior substance abuse in the remote past.  She also has a history of severe malnutrition and is underway today. Above chief complaint and [place] obtained by this physician provider. Review of Systems   Constitutional: Negative for activity change, chills, fatigue and fever. HENT: Negative for congestion. Respiratory: Negative for cough, chest tightness, shortness of breath and wheezing. Cardiovascular: Negative for chest pain and leg swelling. Gastrointestinal: Negative for abdominal pain. Musculoskeletal: Positive for arthralgias (Left leg pain). Negative for back pain and myalgias. Skin: Positive for color change (Purple and black discoloration of left lower leg without significant edema today, skin is rough and consistent with dermatitis). Negative for rash. Psychiatric/Behavioral: Negative for behavioral problems and dysphoric mood. Allergies   Allergen Reactions    Iodine Shortness Of Breath    Bee Pollen Swelling    Tetanus Toxoids Itching    Penicillins Rash     Allergy history updated. Outpatient Medications Marked as Taking for the 5/2/19 encounter (Office Visit) with Josh Galindo MD   Medication Sig Dispense Refill    hydrocortisone (WESTCORT) 0.2 % cream Apply topically 2 times daily. 60 g 1    rivaroxaban (XARELTO) 15 MG TABS tablet Take 1 tablet by mouth daily 90 tablet 1       Medication list reviewed andreconciled by this provider.       Tobacco use:     Past Medical History:   Diagnosis Date    Aortic valvular disease     Asthma     B12 deficiency     COPD (chronic obstructive pulmonary disease) (HCC)     Dementia     DVT (deep vein thrombosis) in pregnancy ([facility] [address]     Hyperlipidemia     Hypertension     Hypothyroidism     Iron deficiency     Osteoarthritis     Peripheral neuropathy     on EMG    Pulmonary nodule     thought benign    Seasonal allergies     Spinal stenosis     Type 2 diabetes mellitus without complication (HCC)      Past Surgical History:   Procedure Laterality Date    APPENDECTOMY     [address] 32    had teeth pulled    EYE SURGERY      KNEE SURGERY Right 1990    LYMPHADENECTOMY  1953    lymph gland removed from neck    NOSE SURGERY      broken nose    ROTATOR CUFF REPAIR Left 2005    surgery    KAREN AND BSO  1971    THORACIC Иван Edward 84    removed 3 ribs    TONSILLECTOMY AND ADENOIDECTOMY       Family History   Problem Relation Age of Onset    High Blood Pressure Mother     Heart Disease Mother     Stroke Mother     Osteoporosis Mother     High Blood Pressure Father     Heart Disease Father     Stroke Father     Cancer Sister     Heart Disease Brother     Cancer Brother      Social History     Socioeconomic History    Marital status:      Spouse name: None    Number of children: None    Years of education: None    Highest education level: None   Occupational History    None   Social Needs    Financial resource strain: None    Food insecurity:     Worry: None     Inability: None    Transportation needs:     Medical: None     Non-medical: None   Tobacco Use    Smoking status: Former Smoker    Smokeless tobacco: Never Used   Substance and Sexual Activity    Alcohol use: No    Drug use: No    Sexual activity: None   Lifestyle    Physical activity:     Days per week: None     Minutes per session: None    Stress: None   Relationships    Social connections:     Talks on phone: None     Gets together: None     Attends Sikh service: None     Active member of club or organization: None     Attends meetings of clubs or organizations: None     Relationship status: None    Intimate partner violence:     Fear of current or ex partner: None     Emotionally abused: None     Physically abused: None     Forced sexual activity:

## 2019-05-03 PROBLEM — N93.9 VAGINAL SPOTTING: Status: RESOLVED | Noted: 2019-03-19 | Resolved: 2019-05-03

## 2019-05-03 PROBLEM — I73.9 PAD (PERIPHERAL ARTERY DISEASE) (HCC): Status: ACTIVE | Noted: 2019-05-03

## 2019-05-03 PROBLEM — I87.8 CHRONIC VENOUS STASIS: Status: RESOLVED | Noted: 2019-03-19 | Resolved: 2019-05-03

## 2019-05-03 PROBLEM — L03.90 CELLULITIS: Status: RESOLVED | Noted: 2019-01-01 | Resolved: 2019-05-03

## 2019-05-03 LAB
FOLATE: >20 NG/ML (ref 4.78–24.2)
VITAMIN B-12: 1057 PG/ML (ref 211–911)

## 2019-05-03 ASSESSMENT — ENCOUNTER SYMPTOMS
SHORTNESS OF BREATH: 0
ABDOMINAL PAIN: 0
COLOR CHANGE: 1
COUGH: 0
CHEST TIGHTNESS: 0
WHEEZING: 0
BACK PAIN: 0

## 2019-05-13 ENCOUNTER — INITIAL CONSULT (OUTPATIENT)
Dept: CARDIOLOGY CLINIC | Age: 84
End: 2019-05-13
Payer: MEDICARE

## 2019-05-13 VITALS
HEART RATE: 76 BPM | HEIGHT: 68 IN | BODY MASS INDEX: 14.16 KG/M2 | SYSTOLIC BLOOD PRESSURE: 150 MMHG | DIASTOLIC BLOOD PRESSURE: 82 MMHG | WEIGHT: 93.4 LBS

## 2019-05-13 DIAGNOSIS — M48.00 SPINAL STENOSIS, UNSPECIFIED SPINAL REGION: ICD-10-CM

## 2019-05-13 DIAGNOSIS — I35.9 AORTIC VALVULAR DISEASE: ICD-10-CM

## 2019-05-13 DIAGNOSIS — Z79.01 CHRONIC ANTICOAGULATION: ICD-10-CM

## 2019-05-13 DIAGNOSIS — I10 ESSENTIAL HYPERTENSION: ICD-10-CM

## 2019-05-13 DIAGNOSIS — O22.30 DVT (DEEP VEIN THROMBOSIS) IN PREGNANCY: ICD-10-CM

## 2019-05-13 DIAGNOSIS — I83.893 VARICOSE VEINS OF BOTH LEGS WITH EDEMA: ICD-10-CM

## 2019-05-13 DIAGNOSIS — Z86.718 HISTORY OF RECURRENT DEEP VEIN THROMBOSIS (DVT): ICD-10-CM

## 2019-05-13 DIAGNOSIS — I87.2 CHRONIC VENOUS STASIS DERMATITIS: ICD-10-CM

## 2019-05-13 DIAGNOSIS — M79.605 PAIN OF LEFT LOWER EXTREMITY: ICD-10-CM

## 2019-05-13 DIAGNOSIS — E03.9 ACQUIRED HYPOTHYROIDISM: Primary | ICD-10-CM

## 2019-05-13 DIAGNOSIS — I35.0 NONRHEUMATIC AORTIC VALVE STENOSIS: ICD-10-CM

## 2019-05-13 DIAGNOSIS — I73.9 PAD (PERIPHERAL ARTERY DISEASE) (HCC): ICD-10-CM

## 2019-05-13 DIAGNOSIS — J98.4 RESTRICTIVE LUNG DISEASE: ICD-10-CM

## 2019-05-13 PROCEDURE — 1090F PRES/ABSN URINE INCON ASSESS: CPT | Performed by: INTERNAL MEDICINE

## 2019-05-13 PROCEDURE — 1036F TOBACCO NON-USER: CPT | Performed by: INTERNAL MEDICINE

## 2019-05-13 PROCEDURE — 93000 ELECTROCARDIOGRAM COMPLETE: CPT | Performed by: INTERNAL MEDICINE

## 2019-05-13 PROCEDURE — 1123F ACP DISCUSS/DSCN MKR DOCD: CPT | Performed by: INTERNAL MEDICINE

## 2019-05-13 PROCEDURE — 4040F PNEUMOC VAC/ADMIN/RCVD: CPT | Performed by: INTERNAL MEDICINE

## 2019-05-13 PROCEDURE — G8427 DOCREV CUR MEDS BY ELIG CLIN: HCPCS | Performed by: INTERNAL MEDICINE

## 2019-05-13 PROCEDURE — G8419 CALC BMI OUT NRM PARAM NOF/U: HCPCS | Performed by: INTERNAL MEDICINE

## 2019-05-13 PROCEDURE — 99204 OFFICE O/P NEW MOD 45 MIN: CPT | Performed by: INTERNAL MEDICINE

## 2019-05-13 NOTE — LETTER
[facility]  [address] [place] [address]  Phone: [phone]  Fax: [phone]    Maria Eugenia Bird MD        May 13, 2019     No primary care provider on file. No primary provider on file. Patient: Angelica Jacome  MR Number: [id number]  YOB: 1931  Date of Visit: 5/13/2019    Dear Dr. Florida Schmidt primary care provider on file.:    Thank you for the request for consultation for Angelica Jacome to me for the evaluation of CVI. Below are the relevant portions of my assessment and plan of care. If you have questions, please do not hesitate to call me. I look forward to following [facility] along with you.     Sincerely,        Maria Eugenia Bird MD

## 2019-05-13 NOTE — PROGRESS NOTES
in her father and mother; Osteoporosis in her mother; Stroke in her father and mother. Review of Systems:   1. Constitutional: No Fever or Weight Loss  2. Eyes: No Decreased Vision  3. ENT: No Headaches, Hearing Loss or Vertigo  4. Cardiovascular: No chest pain, dyspnea on exertion, palpitations or loss of consciousness  5. Respiratory: No cough or wheezing    6. Gastrointestinal: No abdominal pain, appetite loss, blood in stools, constipation, diarrhea or heartburn  7. Genitourinary: No dysuria, trouble voiding, or hematuria  8. Musculoskeletal:  No gait disturbance, weakness or joint complaints  9. Integumentary: C4b changes, L > R  10. Neurological: No TIA or stroke symptoms  11. Psychiatric: No anxiety or depression  12. Endocrine: No malaise, fatigue or temperature intolerance  13. Hematologic/Lymphatic: No bleeding problems, blood clots or swollen lymph nodes  14. Allergic/Immunologic: No nasal congestion or hives    Physical Examination:    BP (!) 150/82   Pulse 76   Ht 5' 8\" (1.727 m)   Wt 93 lb 6.4 oz (42.4 kg)   BMI 14.20 kg/m²    Wt Readings from Last 3 Encounters:   05/13/19 93 lb 6.4 oz (42.4 kg)   05/02/19 90 lb 3.2 oz (40.9 kg)   03/19/19 90 lb 8 oz (41.1 kg)     Body mass index is 14.2 kg/m². General Appearance:  Non-obese/Well Nourished  1. Skin: It is warm & dry. No rashes noted. 2. Eyes: No conjunctival Pallor seen. No jaundice noted. 3. HEENT: atraumatic / normocephalic. EOMI. Neck is supple there is no elevation of JVD. No thyromegaly  4. Respiratory:  · Resp Assessment: Normal  · Resp Auscultation: Normal breath sounds without dullness  5. Cardiovascular:  · Auscultation: 3/6 systolic murmur. · Palpation: Normal    · Carotid Arteries: Normal  · Abdominal Aorta: Pulsation secondary to patient being thin. · Femoral Arteries: 2+ and equal  · Pedal Pulses: 2+ and equal DPs  6. Abdomen:  · No masses or tenderness  · Liver/Spleen: No Abnormalities Noted, no organomegaly.   7. Musculoskeletal: No joint deformities. No muscle wasting  8. Extremities:  ·  No Cyanosis or Clubbing. No significant edema. C4b changes, L > R  9. Rectal / genital: deferred.   10. Neurological/Psychiatric:  · Oriented to time, place, and person  · Non-anxious    Lab Results   Component Value Date    TROPONINT <0.010 01/03/2019     BNP:  No results found for: BNP  PT/INR:  No results found for: PTINR  No results found for: LABA1C  Lab Results   Component Value Date    CHOL 176 12/20/2018    TRIG 72 12/20/2018    HDL 99 12/20/2018    LDLDIRECT 80 12/20/2018     Lab Results   Component Value Date    ALT 14 05/02/2019    ALT 14 01/01/2019    AST 17 05/02/2019    AST 18 01/01/2019     BMP:    Lab Results   Component Value Date     05/02/2019     01/05/2019    K 4.7 05/02/2019    K 4.9 01/05/2019     05/02/2019     01/05/2019    CO2 26 05/02/2019    CO2 31 01/05/2019    BUN 39 05/02/2019    BUN 23 01/05/2019     CMP:    Lab Results   Component Value Date     05/02/2019     01/05/2019    K 4.7 05/02/2019    K 4.9 01/05/2019     05/02/2019     01/05/2019    CO2 26 05/02/2019    CO2 31 01/05/2019    BUN 39 05/02/2019    BUN 23 01/05/2019    PROT 7.2 05/02/2019    PROT 7.1 01/01/2019     TSH:    Lab Results   Component Value Date    TSH 0.02 05/02/2019    TSHHS 17.140 12/20/2018       Echo: done last year in [facility]: not done  EKG: normal sinus rhythm, occasional PVC noted, unifocal    Assessment:   Patient Active Problem List   Diagnosis Code    Acquired hypothyroidism E03.9    Age-related osteoporosis without current pathological fracture M81.0    History of recurrent deep vein thrombosis (DVT) Z86.718    Restrictive lung disease J98.4    MCI (mild cognitive impairment) G31.84    Chronic anticoagulation Z79.01    Physical deconditioning R53.81    Chronic venous stasis dermatitis I87.2    Nonrheumatic aortic valve stenosis I35.0    PAD (peripheral artery disease) ([facility]) I73.9    Type 2 diabetes mellitus without complication ([facility] [address] T37.0    Spinal stenosis M48.00    Hypothyroidism E03.9    Hypertension I10    DVT (deep vein thrombosis) in pregnancy ([facility] [address] O22.30, I82.409    Aortic valvular disease, RAMONITA 1.0 sq cm I35.9    Pain of left lower extremity M79.605    Varicose veins of both legs with edema I83.893     Patient Active Problem List:     Acquired hypothyroidism     Age-related osteoporosis without current pathological fracture     History of recurrent deep vein thrombosis (DVT)     Restrictive lung disease     MCI (mild cognitive impairment)     Chronic anticoagulation     Physical deconditioning     Chronic venous stasis dermatitis     Nonrheumatic aortic valve stenosis     PAD (peripheral artery disease) ([facility])     Type 2 diabetes mellitus without complication ([facility] [address]     Spinal stenosis     Hypothyroidism     Hypertension     DVT (deep vein thrombosis) in pregnancy ([facility] [address]     Aortic valvular disease, RAMONITA 1.0 sq cm     Pain of left lower extremity     Varicose veins of both legs with edema      QUALITY MEASURES REVIEWED:  1.CAD:Patient is taking anti platelet agent:No  2. DYSLIPIDEMIA: Patient is on cholesterol lowering medication:No  3. Beta-Blocker therapy for CAD, if prior Myocardial Infarction:No  4. Atrial fibrillation & anticoagulation therapy Yes for H/O DVT      Recommendations:   1. Significant venous reflux disease clinically. Check US. Patient to wear compression stocking, keep feet propped & continue walking. 2. Aortic stenosis: repeat Echo & F/U  3. ? AAA, check US. OV for F/U on testing.        Judye Seip, MD, 5/13/2019 10:37 AM

## 2019-05-13 NOTE — PATIENT INSTRUCTIONS
1. Significant venous reflux disease clinically. Check US. Patient to wear compression stocking, keep feet propped & continue walking. 2. Aortic stenosis: repeat Echo & F/U  3. ? AAA, check US. OV for F/U on testing.

## 2019-05-15 ENCOUNTER — TELEPHONE (OUTPATIENT)
Dept: CARDIOLOGY CLINIC | Age: 84
End: 2019-05-15

## 2019-05-15 NOTE — TELEPHONE ENCOUNTER
[facility] with [facility] called they filled a script for compression stocking's she can not get them on , pharmacy asking what your recommendations might be

## 2019-05-21 NOTE — TELEPHONE ENCOUNTER
Spoke with patient about compression stockings. She states she went to [facility] and they couldn't get the socks on her. She is scheduled for testing in the next few weeks and f/u with Dr. Markos Becker for further suggestions. Patient voiced understanding.

## 2019-06-05 ENCOUNTER — PROCEDURE VISIT (OUTPATIENT)
Dept: CARDIOLOGY CLINIC | Age: 84
End: 2019-06-05
Payer: MEDICARE

## 2019-06-05 DIAGNOSIS — I73.9 PAD (PERIPHERAL ARTERY DISEASE) (HCC): ICD-10-CM

## 2019-06-05 DIAGNOSIS — E03.9 ACQUIRED HYPOTHYROIDISM: ICD-10-CM

## 2019-06-05 DIAGNOSIS — I35.0 NONRHEUMATIC AORTIC VALVE STENOSIS: Primary | ICD-10-CM

## 2019-06-05 DIAGNOSIS — I35.9 AORTIC VALVULAR DISEASE: ICD-10-CM

## 2019-06-05 DIAGNOSIS — Z86.718 HISTORY OF RECURRENT DEEP VEIN THROMBOSIS (DVT): ICD-10-CM

## 2019-06-05 DIAGNOSIS — Z13.6 SCREENING FOR AAA (ABDOMINAL AORTIC ANEURYSM): Primary | ICD-10-CM

## 2019-06-05 DIAGNOSIS — I83.893 VARICOSE VEINS OF BOTH LEGS WITH EDEMA: ICD-10-CM

## 2019-06-05 DIAGNOSIS — J98.4 RESTRICTIVE LUNG DISEASE: ICD-10-CM

## 2019-06-05 DIAGNOSIS — Z79.01 CHRONIC ANTICOAGULATION: ICD-10-CM

## 2019-06-05 DIAGNOSIS — O22.30 DVT (DEEP VEIN THROMBOSIS) IN PREGNANCY: ICD-10-CM

## 2019-06-05 DIAGNOSIS — I87.2 CHRONIC VENOUS STASIS DERMATITIS: ICD-10-CM

## 2019-06-05 DIAGNOSIS — I83.893 VARICOSE VEINS OF BOTH LEGS WITH EDEMA: Primary | ICD-10-CM

## 2019-06-05 DIAGNOSIS — M48.00 SPINAL STENOSIS, UNSPECIFIED SPINAL REGION: ICD-10-CM

## 2019-06-05 DIAGNOSIS — I10 ESSENTIAL HYPERTENSION: ICD-10-CM

## 2019-06-05 DIAGNOSIS — M79.605 PAIN OF LEFT LOWER EXTREMITY: ICD-10-CM

## 2019-06-05 LAB
LV EF: 45 %
LVEF MODALITY: NORMAL

## 2019-06-05 PROCEDURE — 76706 US ABDL AORTA SCREEN AAA: CPT | Performed by: INTERNAL MEDICINE

## 2019-06-05 PROCEDURE — 93306 TTE W/DOPPLER COMPLETE: CPT | Performed by: INTERNAL MEDICINE

## 2019-06-05 PROCEDURE — 93970 EXTREMITY STUDY: CPT | Performed by: INTERNAL MEDICINE

## 2019-06-10 ENCOUNTER — TELEPHONE (OUTPATIENT)
Dept: CARDIOLOGY CLINIC | Age: 84
End: 2019-06-10

## 2019-06-11 ENCOUNTER — TELEPHONE (OUTPATIENT)
Dept: CARDIOLOGY CLINIC | Age: 84
End: 2019-06-11

## 2019-06-11 NOTE — TELEPHONE ENCOUNTER
LM on VM for patient to call me back. Patient needs advised of test results. Patient needs to schedule L/RHC. Checked with Dr. Yomaira Arthur to see how soon and what provider would be doing procedure since Dr. Olegario Trevino is out of the office this week and on general call next. He states that it is ok for patient to schedule with Troy in 2 weeks, or if patient wants to have it done sooner, then schedule with him. Will try calling patient later today.

## 2019-06-28 ENCOUNTER — OFFICE VISIT (OUTPATIENT)
Dept: CARDIOLOGY CLINIC | Age: 84
End: 2019-06-28
Payer: MEDICARE

## 2019-06-28 VITALS
HEART RATE: 84 BPM | HEIGHT: 68 IN | SYSTOLIC BLOOD PRESSURE: 140 MMHG | BODY MASS INDEX: 14.52 KG/M2 | WEIGHT: 95.8 LBS | DIASTOLIC BLOOD PRESSURE: 80 MMHG

## 2019-06-28 DIAGNOSIS — I05.0 RHEUMATIC MITRAL STENOSIS: Primary | ICD-10-CM

## 2019-06-28 DIAGNOSIS — I87.2 CHRONIC VENOUS STASIS DERMATITIS: ICD-10-CM

## 2019-06-28 DIAGNOSIS — E11.9 TYPE 2 DIABETES MELLITUS WITHOUT COMPLICATION, WITHOUT LONG-TERM CURRENT USE OF INSULIN (HCC): ICD-10-CM

## 2019-06-28 DIAGNOSIS — Z86.718 HISTORY OF RECURRENT DEEP VEIN THROMBOSIS (DVT): ICD-10-CM

## 2019-06-28 DIAGNOSIS — R53.81 PHYSICAL DECONDITIONING: ICD-10-CM

## 2019-06-28 DIAGNOSIS — M48.00 SPINAL STENOSIS, UNSPECIFIED SPINAL REGION: ICD-10-CM

## 2019-06-28 DIAGNOSIS — I35.0 NONRHEUMATIC AORTIC VALVE STENOSIS: ICD-10-CM

## 2019-06-28 DIAGNOSIS — Z79.01 CHRONIC ANTICOAGULATION: ICD-10-CM

## 2019-06-28 DIAGNOSIS — J98.4 RESTRICTIVE LUNG DISEASE: ICD-10-CM

## 2019-06-28 DIAGNOSIS — I83.893 VARICOSE VEINS OF BOTH LEGS WITH EDEMA: ICD-10-CM

## 2019-06-28 DIAGNOSIS — I10 ESSENTIAL HYPERTENSION: ICD-10-CM

## 2019-06-28 PROCEDURE — G8419 CALC BMI OUT NRM PARAM NOF/U: HCPCS | Performed by: INTERNAL MEDICINE

## 2019-06-28 PROCEDURE — 4040F PNEUMOC VAC/ADMIN/RCVD: CPT | Performed by: INTERNAL MEDICINE

## 2019-06-28 PROCEDURE — 1036F TOBACCO NON-USER: CPT | Performed by: INTERNAL MEDICINE

## 2019-06-28 PROCEDURE — 99213 OFFICE O/P EST LOW 20 MIN: CPT | Performed by: INTERNAL MEDICINE

## 2019-06-28 PROCEDURE — G8427 DOCREV CUR MEDS BY ELIG CLIN: HCPCS | Performed by: INTERNAL MEDICINE

## 2019-06-28 PROCEDURE — 1123F ACP DISCUSS/DSCN MKR DOCD: CPT | Performed by: INTERNAL MEDICINE

## 2019-06-28 PROCEDURE — 1090F PRES/ABSN URINE INCON ASSESS: CPT | Performed by: INTERNAL MEDICINE

## 2019-06-28 NOTE — PROGRESS NOTES
OFFICE PROGRESS NOTES      Sven is a 80 y.o. female who has    CHIEF COMPLAINT AS FOLLOWS:  CHEST PAIN: Patient denies any C/O chest pains at this time. SOB: No C/O SOB at this time. LEG EDEMA: Left Leg pain & discolouration. PALPITATIONS: Denies any C/O Palpitations   DIZZINESS: No C/O Dizziness                          SYNCOPE: None   OTHER:                                     HPI: Patient is here for F/U on her CVI, VHD, HTN & Dyslipidemia problems. She does not have any new complaints at this time. Emmett Shrestha has the following history recorded in care path:  Patient Active Problem List    Diagnosis Date Noted    Rheumatic mitral stenosis 06/28/2019    Pain of left lower extremity 05/13/2019    Varicose veins of both legs with edema 05/13/2019    Type 2 diabetes mellitus without complication ([address]     Spinal stenosis     Hypothyroidism     Hypertension     DVT (deep vein thrombosis) in pregnancy ([address]     Aortic valvular disease, RAMONITA 1.0 sq cm     PAD [facility]peripheral artery disease) [facility] [address] 05/03/2019    Nonrheumatic aortic valve stenosis 05/02/2019    Chronic venous stasis dermatitis 01/23/2019    Physical deconditioning 01/05/2019    Acquired hypothyroidism 12/20/2018    Age-related osteoporosis without current pathological fracture 12/20/2018    History of recurrent deep vein thrombosis (DVT) 12/20/2018    Restrictive lung disease 12/20/2018    MCI (mild cognitive impairment) 12/20/2018    Chronic anticoagulation 12/20/2018     Current Outpatient Medications   Medication Sig Dispense Refill    rivaroxaban (XARELTO) 15 MG TABS tablet Take 1 tablet by mouth daily 90 tablet 1    vitamin D (CHOLECALCIFEROL) 1000 UNIT TABS tablet Take 1 tablet by mouth daily 90 tablet 1    bacitracin-polymyxin b (POLYSPORIN) 500-43608 UNIT/GM ointment Apply topically 2 times daily.  28.35 g 3    levothyroxine (SYNTHROID) 25 MCG tablet Take 1 tablet by mouth daily 30 tablet 5    Elastic Bandages & Supports (MEDICAL COMPRESSION STOCKINGS) MISC 1 each by Does not apply route daily 20 to 30 mm of Hg, thigh high. 1 each 3    hydrocortisone (WESTCORT) 0.2 % cream Apply topically 2 times daily. 60 g 1    Compression Stockings MISC by Does not apply route Wear during day time and remove at night. 1 each 2    traMADol (ULTRAM) 50 MG tablet Take 50 mg by mouth every 6 hours as needed for Pain. .       No current facility-administered medications for this visit.       Allergies: Iodine; Bee pollen; Tetanus toxoids; and Penicillins  Past Medical History:   Diagnosis Date    Aortic valvular disease     Asthma     B12 deficiency     COPD [facility]chronic obstructive pulmonary disease) [facility]     Dementia     DVT (deep vein thrombosis) in pregnancy [facility] [address]     H/O Doppler ABD [place] ultrasound 06/05/2019    NO evidence of AAA within the visualized portions of the abdominal aorta, possible right iliac artery stenosis    H/O Doppler lower venous ultrasound 06/05/2019    NO DVT OR SVT, No significant reflux in right, Significant reflux in Left CFV    H/O echocardiogram 06/05/2019    EF 45%, Moderate basal anteroseptal wall asymmetrical left ventricular hypertrophy, Left atrium mild to mod dilated, Mild to Mod AS, Mitral stenosis is severe, Mod AR and Mod Pulmonic regurg, Mild MR, TR, Mod Pulm HTN    Hyperlipidemia     Hypertension     Hypothyroidism     Iron deficiency     Osteoarthritis     Peripheral neuropathy     on EMG    Pulmonary nodule     thought benign    Seasonal allergies     Spinal stenosis     Type 2 diabetes mellitus without complication ([facility] [address]      Past Surgical History:   Procedure Laterality Date    APPENDECTOMY      BREAST BIOPSY     [address]    had teeth pulled    EYE SURGERY      KNEE SURGERY Right 1990    LYMPHADENECTOMY  1953    lymph gland removed from neck    NOSE SURGERY      broken nose    ROTATOR CUFF REPAIR Left 2005    surgery    KAREN AND BSO  1971    THORACIC [address]    removed 3 ribs    TONSILLECTOMY AND ADENOIDECTOMY        As reviewed   Family History   Problem Relation Age of Onset    High Blood Pressure Mother     Heart Disease Mother     Stroke Mother     Osteoporosis Mother     High Blood Pressure Father     Heart Disease Father     Stroke Father     Cancer Sister     Heart Disease Brother     Cancer Brother      Social History     Tobacco Use    Smoking status: Former Smoker    Smokeless tobacco: Never Used   Substance Use Topics    Alcohol use: No      Review of Systems:    Constitutional: Negative for diaphoresis and fatigue  Psychological:Negative for anxiety or depression  HENT: Negative for headaches, nasal congestion, sinus pain or vertigo  Eyes: Negative for visual disturbance. Endocrine: Negative for polydipsia/polyuria  Respiratory: Negative for shortness of breath  Cardiovascular: Negative for chest pain, dyspnea on exertion, claudication, edema, irregular heartbeat, murmur, palpitations or shortness of breath  Gastrointestinal: Negative for abdominal pain or heartburn  Genito-Urinary: Negative for urinary frequency/urgency  Musculoskeletal: Negative for muscle pain, muscular weakness, negative for pain in arm and leg or swelling in foot and leg  Neurological: Negative for dizziness, headaches, memory loss, numbness/tingling, visual changes, syncope  Dermatological: Negative for rash    Objective:  BP (!) 140/80 (Site: Left Upper Arm, Position: Sitting, Cuff Size: Child)   Pulse 84   Ht 5' 8\" (1.727 m)   Wt 95 lb 12.8 oz (43.5 kg)   BMI 14.57 kg/m²   Wt Readings from Last 3 Encounters:   06/28/19 95 lb 12.8 oz (43.5 kg)   05/13/19 93 lb 6.4 oz (42.4 kg)   05/02/19 90 lb 3.2 oz (40.9 kg)     Body mass index is 14.57 kg/m². GENERAL - Alert, oriented, pleasant, in no apparent distress. EYES: No jaundice, no conjunctival pallor. SKIN: It is warm & dry.  No rashes. No Echhymosis    HEENT - No clinically significant abnormalities seen. Neck - Supple. No jugular venous distention noted. No carotid bruits. Right Carotid transmitted murmur  Cardiovascular - Normal S1 and S2 with 3/6 MDM & Systolic murmur. Extremities - No cyanosis, clubbing, or significant edema. Pulmonary - No respiratory distress. No wheezes or rales. Abdomen - No masses, tenderness, or organomegaly. Musculoskeletal - No significant edema. No joint deformities. No muscle wasting. Neurologic - Cranial nerves II through XII are grossly intact. There were no gross focal neurologic abnormalities.     Lab Review   Lab Results   Component Value Date    TROPONINT <0.010 01/03/2019     BNP:  No results found for: BNP  PT/INR:  No results found for: INR  No results found for: LABA1C  Lab Results   Component Value Date    WBC 5.4 05/02/2019    WBC 4.3 01/05/2019    HCT 36.8 05/02/2019    HCT 37.3 01/05/2019    MCV 83.4 05/02/2019    MCV 90.3 01/05/2019     05/02/2019     01/05/2019     Lab Results   Component Value Date    CHOL 176 12/20/2018    TRIG 72 12/20/2018    HDL 99 12/20/2018    LDLDIRECT 80 12/20/2018     Lab Results   Component Value Date    ALT 14 05/02/2019    ALT 14 01/01/2019    AST 17 05/02/2019    AST 18 01/01/2019     BMP:    Lab Results   Component Value Date     05/02/2019     01/05/2019    K 4.7 05/02/2019    K 4.9 01/05/2019     05/02/2019     01/05/2019    CO2 26 05/02/2019    CO2 31 01/05/2019    BUN 39 05/02/2019    BUN 23 01/05/2019    CREATININE 0.8 05/02/2019    CREATININE 1.0 01/05/2019     CMP:   Lab Results   Component Value Date     05/02/2019     01/05/2019    K 4.7 05/02/2019    K 4.9 01/05/2019     05/02/2019     01/05/2019    CO2 26 05/02/2019    CO2 31 01/05/2019    BUN 39 05/02/2019    BUN 23 01/05/2019    PROT 7.2 05/02/2019    PROT 7.1 01/01/2019     TSH:    Lab Results   Component Value Date    TSH 0.02 05/02/2019    [facility] 17.140 12/20/2018       QUALITY MEASURES REVIEWED:  1.CAD:Patient is taking anti platelet agent:No  2. DYSLIPIDEMIA: Patient is on cholesterol lowering medication:No  3. Beta-Blocker therapy for CAD, if prior Myocardial Infarction:Yes  4. Atrial fibrillation & anticoagulation therapy No      Impression:    1. Rheumatic mitral stenosis    2. History of recurrent deep vein thrombosis (DVT)    3. Restrictive lung disease    4. Chronic anticoagulation    5. Physical deconditioning    6. Chronic venous stasis dermatitis    7. Nonrheumatic aortic valve stenosis    8. Type 2 diabetes mellitus without complication, without long-term current use of insulin ([facility])    9. Spinal stenosis, unspecified spinal region    10. Essential hypertension    11. Varicose veins of both legs with edema       Patient Active Problem List   Diagnosis Code    Acquired hypothyroidism E03.9    Age-related osteoporosis without current pathological fracture M81.0    History of recurrent deep vein thrombosis (DVT) Z86.718    Restrictive lung disease J98.4    MCI (mild cognitive impairment) G31.84    Chronic anticoagulation Z79.01    Physical deconditioning R53.81    Chronic venous stasis dermatitis I87.2    Nonrheumatic aortic valve stenosis I35.0    PAD (peripheral artery disease) ([facility]) I73.9    Type 2 diabetes mellitus without complication ([facility] [address] G14.4    Spinal stenosis M48.00    Hypothyroidism E03.9    Hypertension I10    DVT (deep vein thrombosis) in pregnancy ([facility] [address] O22.30, I82.409    Aortic valvular disease, RAMONITA 1.0 sq cm I35.9    Pain of left lower extremity M79.605    Varicose veins of both legs with edema I83.893    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       Assessment & Plan:    CAD:No  HTN:well controlled on current medical regimen, see list above.             - changes in  treatment:   no   CARDIOMYOPATHY: None known   CONGESTIVE HEART FAILURE: NO KNOWN HISTORY.    VHD:  significant VHD noted with severe MS. Will discuss with  ? Percutaneous treatments. DYSLIPIDEMIA: Patient's profile is at / near [employer],    Diabetes mellitis: .yes,                                      Managed by family MD  PAD:  known. claudication symptoms. .no  OTHER RELEVANT DIAGNOSIS:as noted in patient's active problem list:  TESTS ORDERED: None this visit                                     All previously ordered tests reviewed. No AAA. Only Left deep vein Reflex described. ARRHYTHMIAS: None known   MEDICATIONS: CPM   Office f/u in one month. Primary/secondary prevention is the goal by aggressive risk modification, healthy and therapeutic life style changes for cardiovascular risk reduction. Various goals are discussed and multiple questions answered.

## 2019-08-06 ENCOUNTER — TELEPHONE (OUTPATIENT)
Dept: CARDIOLOGY CLINIC | Age: 84
End: 2019-08-06

## 2019-08-06 NOTE — TELEPHONE ENCOUNTER
Patient's friend called patient was to have been called Dr Chadd Wen was to discuss with Dr Alfredito Roberts about patient's valve issue @ get back with patient , she is upset that she has not heard anything , we also had to reschedule her appointment

## 2019-08-07 NOTE — TELEPHONE ENCOUNTER
Called  & discussed w/him about this pt's VHD care. He stated that the pt was hesitant @ her last OV if wanted anything done. Thus she & family were to discuss. Left voicemail message for pt's ER contact Tereso Maverick, who is on pt's signed HIPAA form, that once pt & family agreed for further treatment of pt's VHD/Severe Mitral Valve stenosis, then he would discuss w/. Pt's next OV w/ is on 8/20/19.

## 2019-08-20 ENCOUNTER — OFFICE VISIT (OUTPATIENT)
Dept: CARDIOLOGY CLINIC | Age: 84
End: 2019-08-20
Payer: MEDICARE

## 2019-08-20 VITALS
DIASTOLIC BLOOD PRESSURE: 80 MMHG | HEIGHT: 68 IN | HEART RATE: 80 BPM | WEIGHT: 98.4 LBS | BODY MASS INDEX: 14.91 KG/M2 | SYSTOLIC BLOOD PRESSURE: 122 MMHG

## 2019-08-20 DIAGNOSIS — I83.893 VARICOSE VEINS OF BOTH LEGS WITH EDEMA: ICD-10-CM

## 2019-08-20 DIAGNOSIS — I35.9 AORTIC VALVULAR DISEASE: ICD-10-CM

## 2019-08-20 DIAGNOSIS — Z86.718 HISTORY OF RECURRENT DEEP VEIN THROMBOSIS (DVT): ICD-10-CM

## 2019-08-20 DIAGNOSIS — I73.9 PAD (PERIPHERAL ARTERY DISEASE) (HCC): ICD-10-CM

## 2019-08-20 DIAGNOSIS — I10 ESSENTIAL HYPERTENSION: ICD-10-CM

## 2019-08-20 DIAGNOSIS — E11.9 TYPE 2 DIABETES MELLITUS WITHOUT COMPLICATION, WITHOUT LONG-TERM CURRENT USE OF INSULIN (HCC): ICD-10-CM

## 2019-08-20 DIAGNOSIS — Z79.01 CHRONIC ANTICOAGULATION: ICD-10-CM

## 2019-08-20 DIAGNOSIS — E03.9 ACQUIRED HYPOTHYROIDISM: ICD-10-CM

## 2019-08-20 DIAGNOSIS — I05.0 RHEUMATIC MITRAL STENOSIS: Primary | ICD-10-CM

## 2019-08-20 PROCEDURE — 99213 OFFICE O/P EST LOW 20 MIN: CPT | Performed by: INTERNAL MEDICINE

## 2019-08-20 PROCEDURE — G8427 DOCREV CUR MEDS BY ELIG CLIN: HCPCS | Performed by: INTERNAL MEDICINE

## 2019-08-20 PROCEDURE — 1090F PRES/ABSN URINE INCON ASSESS: CPT | Performed by: INTERNAL MEDICINE

## 2019-08-20 PROCEDURE — 1036F TOBACCO NON-USER: CPT | Performed by: INTERNAL MEDICINE

## 2019-08-20 PROCEDURE — G8419 CALC BMI OUT NRM PARAM NOF/U: HCPCS | Performed by: INTERNAL MEDICINE

## 2019-08-20 PROCEDURE — 4040F PNEUMOC VAC/ADMIN/RCVD: CPT | Performed by: INTERNAL MEDICINE

## 2019-08-20 PROCEDURE — 1123F ACP DISCUSS/DSCN MKR DOCD: CPT | Performed by: INTERNAL MEDICINE

## 2019-08-20 NOTE — PATIENT INSTRUCTIONS
CAD:No  HTN:well controlled on current medical regimen, see list above. - changes in  treatment: no      VHD: Severe MS. Patient & family decided to be seen  At [facility] for VHD. DYSLIPIDEMIA: Patient's profile is at / near [place],                                 HDL is high   Diabetes mellitis: .yes,                                      Managed by family MD  PAD:  known. claudication symptoms. .no  OTHER RELEVANT DIAGNOSIS:as noted in patient's active problem list:  H/O DVT  CVI  TESTS ORDERED: None this visit                                              All previously ordered tests reviewed. ARRHYTHMIAS: None known   MEDICATIONS: CPM   Office f/u in one month. Primary/secondary prevention is the goal by aggressive risk modification, healthy and therapeutic life style changes for cardiovascular risk reduction. Various goals are discussed and multiple questions answered.

## 2019-08-20 NOTE — PROGRESS NOTES
OFFICE PROGRESS NOTES      Flor Gregory is a 80 y.o. female who has    CHIEF COMPLAINT AS FOLLOWS:  CHEST PAIN: Patient denies any C/O chest pains at this time. SOB:  Has SOB with exertion but no change over previous noted. LEG EDEMA: B/L Lower extremity edema is present but no change over previous. PALPITATIONS: Denies any C/O Palpitations                                 DIZZINESS: No C/O Dizziness                        SYNCOPE: None   OTHER:                                     HPI: Patient is here for F/U on her VHD, PAD, HTN & Dyslipidemia problems. She does not have any new complaints at this time. Dylan Parrish has the following history recorded in care path:  Patient Active Problem List    Diagnosis Date Noted    Rheumatic mitral stenosis 06/28/2019    Pain of left lower extremity 05/13/2019    Varicose veins of both legs with edema 05/13/2019    Type 2 diabetes mellitus without complication ([address]     Spinal stenosis     Hypothyroidism     Hypertension     DVT (deep vein thrombosis) in pregnancy ([address]     Aortic valvular disease, RAMONITA 1.0 sq cm     PAD (peripheral artery disease) ([address] 05/03/2019    Nonrheumatic aortic valve stenosis 05/02/2019    Chronic venous stasis dermatitis 01/23/2019    Physical deconditioning 01/05/2019    Acquired hypothyroidism 12/20/2018    Age-related osteoporosis without current pathological fracture 12/20/2018    History of recurrent deep vein thrombosis (DVT) 12/20/2018    Restrictive lung disease 12/20/2018    MCI (mild cognitive impairment) 12/20/2018    Chronic anticoagulation 12/20/2018     Current Outpatient Medications   Medication Sig Dispense Refill    rivaroxaban (XARELTO) 15 MG TABS tablet Take 1 tablet by mouth daily 90 tablet 1     No current facility-administered medications for this visit.       Allergies: Iodine; Bee pollen; Tetanus toxoids; and Penicillins  Past BNP:  No results found for: BNP  PT/INR:  No results found for: INR  No results found for: LABA1C  Lab Results   Component Value Date    WBC 5.4 05/02/2019    WBC 4.3 01/05/2019    HCT 36.8 05/02/2019    HCT 37.3 01/05/2019    MCV 83.4 05/02/2019    MCV 90.3 01/05/2019     05/02/2019     01/05/2019     Lab Results   Component Value Date    CHOL 176 12/20/2018    TRIG 72 12/20/2018    HDL 99 12/20/2018    LDLDIRECT 80 12/20/2018     Lab Results   Component Value Date    ALT 14 05/02/2019    ALT 14 01/01/2019    AST 17 05/02/2019    AST 18 01/01/2019     BMP:    Lab Results   Component Value Date     05/02/2019     01/05/2019    K 4.7 05/02/2019    K 4.9 01/05/2019     05/02/2019     01/05/2019    CO2 26 05/02/2019    CO2 31 01/05/2019    BUN 39 05/02/2019    BUN 23 01/05/2019    CREATININE 0.8 05/02/2019    CREATININE 1.0 01/05/2019     CMP:   Lab Results   Component Value Date     05/02/2019     01/05/2019    K 4.7 05/02/2019    K 4.9 01/05/2019     05/02/2019     01/05/2019    CO2 26 05/02/2019    CO2 31 01/05/2019    BUN 39 05/02/2019    BUN 23 01/05/2019    PROT 7.2 05/02/2019    PROT 7.1 01/01/2019     TSH:    Lab Results   Component Value Date    TSH 0.02 05/02/2019    [facility] 17.140 12/20/2018       QUALITY MEASURES REVIEWED:  1.CAD:Patient is taking anti platelet agent:No  2. DYSLIPIDEMIA: Patient is on cholesterol lowering medication:No  3. Beta-Blocker therapy for CAD, if prior Myocardial Infarction:No  4. Atrial fibrillation & anticoagulation therapy Yes      Impression:    1. Varicose veins of both legs with edema    2. History of recurrent deep vein thrombosis (DVT)    3. Chronic anticoagulation    4. PAD (peripheral artery disease) ([facility] [address]    5. Type 2 diabetes mellitus without complication, without long-term current use of insulin ([address]    6. Essential hypertension    7. Acquired hypothyroidism    8.  Aortic valvular disease, RAMONITA 1.0 sq cm

## 2019-09-05 ENCOUNTER — NURSE ONLY (OUTPATIENT)
Dept: FAMILY MEDICINE CLINIC | Age: 84
End: 2019-09-05
Payer: MEDICARE

## 2019-09-05 DIAGNOSIS — Z86.718 HISTORY OF RECURRENT DEEP VEIN THROMBOSIS (DVT): ICD-10-CM

## 2019-09-05 PROCEDURE — 36415 COLL VENOUS BLD VENIPUNCTURE: CPT | Performed by: NURSE PRACTITIONER

## 2019-10-17 LAB
ALBUMIN SERPL-MCNC: 4.7 G/DL
ALBUMIN SERPL-MCNC: 4.7 G/DL
ALP BLD-CCNC: 76 U/L
ALP BLD-CCNC: 76 U/L
ALT SERPL-CCNC: 11 U/L
ALT SERPL-CCNC: 11 U/L
ANION GAP SERPL CALCULATED.3IONS-SCNC: 18 MMOL/L
ANION GAP SERPL CALCULATED.3IONS-SCNC: 18 MMOL/L
AST SERPL-CCNC: 19 U/L
AST SERPL-CCNC: 19 U/L
BASOPHILS ABSOLUTE: 0.04 /ΜL
BASOPHILS RELATIVE PERCENT: 0.8 %
BILIRUB SERPL-MCNC: 0.7 MG/DL (ref 0.1–1.4)
BILIRUB SERPL-MCNC: 0.7 MG/DL (ref 0.1–1.4)
BUN BLDV-MCNC: 15 MG/DL
BUN BLDV-MCNC: 15 MG/DL
CALCIUM SERPL-MCNC: 9.8 MG/DL
CALCIUM SERPL-MCNC: 9.8 MG/DL
CHLORIDE BLD-SCNC: 103 MMOL/L
CHLORIDE BLD-SCNC: 103 MMOL/L
CO2: NORMAL MMOL/L
CO2: NORMAL MMOL/L
CREAT SERPL-MCNC: 0.69 MG/DL
CREAT SERPL-MCNC: 0.69 MG/DL
EOSINOPHILS ABSOLUTE: 0.14 /ΜL
EOSINOPHILS RELATIVE PERCENT: 2.8 %
GFR CALCULATED: 78
GFR CALCULATED: NORMAL
GLUCOSE BLD-MCNC: 73 MG/DL
GLUCOSE BLD-MCNC: 73 MG/DL
HCT VFR BLD CALC: 39.9 % (ref 36–46)
HEMOGLOBIN: 12.4 G/DL (ref 12–16)
LYMPHOCYTES ABSOLUTE: 1.03 /ΜL
LYMPHOCYTES RELATIVE PERCENT: 20.5 %
MCH RBC QN AUTO: 27.2 PG
MCHC RBC AUTO-ENTMCNC: 31.1 G/DL
MCV RBC AUTO: 87.5 FL
MONOCYTES ABSOLUTE: 0.36 /ΜL
MONOCYTES RELATIVE PERCENT: 7.2 %
NEUTROPHILS ABSOLUTE: 3.43 /ΜL
NEUTROPHILS RELATIVE PERCENT: 68.3 %
PDW BLD-RTO: 15.1 %
PLATELET # BLD: 154 K/ΜL
PMV BLD AUTO: 12.2 FL
POTASSIUM SERPL-SCNC: 4.2 MMOL/L
POTASSIUM SERPL-SCNC: 4.2 MMOL/L
RBC # BLD: 4.56 10^6/ΜL
SODIUM BLD-SCNC: 142 MMOL/L
SODIUM BLD-SCNC: 142 MMOL/L
TOTAL PROTEIN: 7.4
TOTAL PROTEIN: 7.4
WBC # BLD: 5.02 10^3/ML

## 2019-11-11 ENCOUNTER — OFFICE VISIT (OUTPATIENT)
Dept: FAMILY MEDICINE CLINIC | Age: 84
End: 2019-11-11
Payer: MEDICARE

## 2019-11-11 VITALS
HEIGHT: 66 IN | HEART RATE: 88 BPM | OXYGEN SATURATION: 97 % | BODY MASS INDEX: 16.52 KG/M2 | DIASTOLIC BLOOD PRESSURE: 84 MMHG | WEIGHT: 102.8 LBS | SYSTOLIC BLOOD PRESSURE: 142 MMHG

## 2019-11-11 DIAGNOSIS — Z00.00 ROUTINE GENERAL MEDICAL EXAMINATION AT A HEALTH CARE FACILITY: Primary | ICD-10-CM

## 2019-11-11 PROCEDURE — G0438 PPPS, INITIAL VISIT: HCPCS | Performed by: FAMILY MEDICINE

## 2019-11-11 PROCEDURE — G8484 FLU IMMUNIZE NO ADMIN: HCPCS | Performed by: FAMILY MEDICINE

## 2019-11-11 PROCEDURE — 4040F PNEUMOC VAC/ADMIN/RCVD: CPT | Performed by: FAMILY MEDICINE

## 2019-11-11 PROCEDURE — 1123F ACP DISCUSS/DSCN MKR DOCD: CPT | Performed by: FAMILY MEDICINE

## 2019-11-11 ASSESSMENT — LIFESTYLE VARIABLES: HOW OFTEN DO YOU HAVE A DRINK CONTAINING ALCOHOL: 0

## 2019-11-11 ASSESSMENT — PATIENT HEALTH QUESTIONNAIRE - PHQ9
SUM OF ALL RESPONSES TO PHQ QUESTIONS 1-9: 0
SUM OF ALL RESPONSES TO PHQ QUESTIONS 1-9: 0

## 2020-03-25 PROBLEM — E03.9 ACQUIRED HYPOTHYROIDISM: Status: RESOLVED | Noted: 2018-12-20 | Resolved: 2020-03-24

## 2020-09-05 ENCOUNTER — HOSPITAL ENCOUNTER (EMERGENCY)
Age: 85
Discharge: HOME | End: 2020-09-05
Payer: MEDICARE

## 2020-09-05 ENCOUNTER — APPOINTMENT (OUTPATIENT)
Dept: CT IMAGING | Age: 85
End: 2020-09-05
Payer: MEDICARE

## 2020-09-05 VITALS
WEIGHT: 102 LBS | HEART RATE: 72 BPM | SYSTOLIC BLOOD PRESSURE: 142 MMHG | RESPIRATION RATE: 16 BRPM | BODY MASS INDEX: 16.46 KG/M2 | TEMPERATURE: 98.3 F | DIASTOLIC BLOOD PRESSURE: 64 MMHG | OXYGEN SATURATION: 95 %

## 2020-09-05 PROCEDURE — 70450 CT HEAD/BRAIN W/O DYE: CPT

## 2020-09-05 PROCEDURE — 99283 EMERGENCY DEPT VISIT LOW MDM: CPT

## 2020-09-05 PROCEDURE — 2500000003 HC RX 250 WO HCPCS: Performed by: PHYSICIAN ASSISTANT

## 2020-09-05 PROCEDURE — 72125 CT NECK SPINE W/O DYE: CPT

## 2020-09-05 PROCEDURE — 4500000027

## 2020-09-05 RX ORDER — LIDOCAINE HYDROCHLORIDE 20 MG/ML
5 INJECTION, SOLUTION EPIDURAL; INFILTRATION; INTRACAUDAL; PERINEURAL ONCE
Status: COMPLETED | OUTPATIENT
Start: 2020-09-05 | End: 2020-09-05

## 2020-09-05 RX ADMIN — LIDOCAINE HYDROCHLORIDE 5 ML: 20 INJECTION, SOLUTION EPIDURAL; INFILTRATION; INTRACAUDAL at 22:53

## 2020-09-05 ASSESSMENT — PAIN SCALES - GENERAL: PAINLEVEL_OUTOF10: 8

## 2020-09-06 NOTE — ED PROVIDER NOTES
EMERGENCY DEPARTMENT ENCOUNTER        PCP: Moncho Jimenez MD    [address] [facility]    Chief Complaint   Patient presents with    Fall    Laceration     to the head       This patient was not evaluated by the attending physician. I have independently evaluated this patient. HPI    Mario Mcmahon is a 80 y.o. female who presents with scalp laceration after falling off her bed. Context is patient was sitting on the edge of her bed after cleaning out her closet when she lost her balance and fell backward striking her head on the linoleum floor. Patient denies loss of consciousness. Injury occurred just PTA. Duration of pain has been constant and mild since onset of the injury. Patient describes slight headache over the scalp laceration. Headache is not worst of life. Location of pain is directly over scalp laceration. Patient has associated symptom of mild swelling and slight bleeding from scalp laceration on left side of back of scalp. Patient denies symptoms preceding fall. Patient denies neck or extremity pain. Patient does take Xarelto. Patient denies numbness, tingling, weakness, functional/motor deficit, nausea, emesis. Patient denies foreign body sensation. Patient denies bleeding from ears, eyes, nose, mouth. Patient denies tetanus status being up to date but reports allergy to tetanus toxoid. REVIEW OF SYSTEMS    General:   Denies symptoms preceding injury. Denies syncope. Skin: + Laceration. See HPI. Musculoskeletal:  No distal numbness, tingling. No obvious tendon or motor deficits. Denies neck and back pain. Denies any other musculoskeletal injuries or skin trauma.   Neurologic: + headache; Denies numbness, weakness, tingling, vision changes, hearing change, speech changes, gait changes, loss of consciousness, confusion  All other review of systems are negative  See HPI and nursing notes for additional information     PAST MEDICAL & SURGICAL HISTORY    Past Medical History:   Diagnosis Date    Aortic valvular disease     Asthma     B12 deficiency     COPD (chronic obstructive pulmonary disease) (HCC)     Dementia (HCC)     DVT (deep vein thrombosis) in pregnancy     H/O Doppler ABD A[place] ultrasound 06/05/2019    NO evidence of AAA within the visualized portions of the abdominal aorta, possible right iliac artery stenosis    H/O Doppler lower venous ultrasound 06/05/2019    NO DVT OR SVT, No significant reflux in right, Significant reflux in Left CFV    H/O echocardiogram 06/05/2019    EF 45%, Moderate basal anteroseptal wall asymmetrical left ventricular hypertrophy, Left atrium mild to mod dilated, Mild to Mod AS, Mitral stenosis is severe, Mod AR and Mod Pulmonic regurg, Mild MR, TR, Mod Pulm HTN    Hyperlipidemia     Hypertension     Hypothyroidism     Iron deficiency     Osteoarthritis     Peripheral neuropathy     on EMG    Pulmonary nodule     thought benign    Seasonal allergies     Spinal stenosis     Type 2 diabetes mellitus without complication ([facility] [address]      Past Surgical History:   Procedure Laterality Date    APPENDECTOMY      BREAST BIOPSY     [address]    had teeth pulled    EYE SURGERY      KNEE SURGERY Right 1990    LYMPHADENECTOMY  1953    lymph gland removed from neck    NOSE SURGERY      broken nose    ROTATOR CUFF REPAIR Left 2005    surgery    KAREN AND BSO  1971    THORACIC DISC SURGERY  1976    removed 3 ribs    TONSILLECTOMY AND ADENOIDECTOMY         CURRENT MEDICATIONS    Current Outpatient Rx   Medication Sig Dispense Refill    rivaroxaban (XARELTO) 15 MG TABS tablet Take 1 tablet by mouth daily 90 tablet 1       ALLERGIES    Allergies   Allergen Reactions    Iodine Shortness Of Breath    Bee Pollen Swelling    Tetanus Toxoids Itching    Penicillins Rash       SOCIAL & FAMILY HISTORY    Social History     Socioeconomic History    Marital status:      Spouse name: None    Number of children: None    Years of education: Wound was irrigated with copious amounts of sterile saline and mechanically debrided utilizing sterile gauze. - The laceration was closed with 3 staples all simple interrupted  - Hemostasis and good cosmesis was achieved. Blood loss minimal.  - The wound area was then left open to air.  - Patient tolerated procedure well without complications. Total repaired wound length: 1.3 cm  ________________________________________________________________________              ED COURSE & MEDICAL DECISION MAKING       Vital signs and nursing notes reviewed during ED course. I have independently evaluated this patient. Supervising physician present in the Emergency Department, available for consultation, throughout entirety of  patient care. Patient presents today for scalp laceration and head injury after mechanical fall. Scalp laceration repaired in the ED today. Tetanus status not up-to-date as patient reports allergy to tetanus toxoid. Patient is on Xarelto and therefore CT head was obtained and reveals no acute abnormality. Given patient's age and fall did obtain CT cervical spine reveals no acute abnormality. Patient is neurologically intact on examination. I discussed possibility of infection, retained foreign body, tendon injury, nerve injury. I discussed wound care instructions today with patient and/or family. Patient and/or family agrees to having a recheck in 2 days and suture/staple removal in 7-10 days (which we discussed today) with PCP. We also discussed scars and ways to minimize scarring including but not limited to protection of scar from sunlight for one year and massaging of scar tissue. Patient is nontoxic appearing. Vitals signs are stable. Patient is stable for outpatient management at this time. Closed head injury precautions discussed with patient.   Advised patient to avoid activities that could result in a second head injury prior to complete resolution of symptoms from this head injury. All pertinent Lab data and radiographic results reviewed with patient at bedside. The patient and/or the family were informed of the results of any tests/labs/imaging, the treatment plan, and time was allotted to answer questions. Disposition, diagnosis, and plan discussed at bedside with patient and/or the family today who understands and agrees. Return to emergency department precautions were discussed in detail with patient and/or family who understands and agrees to return for new or worsening symptoms including but not limited to numbness, weakness, tingling, fever, chills, redness around wound, streaking redness proximal or distal to wound, purulent drainage from wound, nausea, vomiting, joint redness, joint swelling. Clinical  IMPRESSION    1. Laceration of scalp, initial encounter    2. Injury of head, initial encounter    3. Fall, initial encounter         Disposition referral (if applicable):  Sindhu Prasad MD  [address]   [phone]    Call   Recheck in 2-3 days, For suture/staple removal in 7-10 days    [facility] Emergency Department  [address] [facility] [id number] [phone]  Go to   Return to ED if symptoms worsen or new symptoms      Disposition medications (if applicable):  New Prescriptions    No medications on file       Comment: Please note this report has been produced using speech recognition software and may contain errors related to that system including errors in grammar, punctuation, and spelling, as well as words and phrases that may be inappropriate. If there are any questions or concerns please feel free to contact the dictating provider for clarification.         Nilsa Dinero PA-C  09/05/20 0667

## 2020-09-14 ENCOUNTER — OFFICE VISIT (OUTPATIENT)
Dept: FAMILY MEDICINE CLINIC | Age: 85
End: 2020-09-14
Payer: MEDICARE

## 2020-09-14 VITALS
BODY MASS INDEX: 15.04 KG/M2 | DIASTOLIC BLOOD PRESSURE: 82 MMHG | WEIGHT: 93.2 LBS | HEART RATE: 78 BPM | SYSTOLIC BLOOD PRESSURE: 124 MMHG | OXYGEN SATURATION: 96 %

## 2020-09-14 PROBLEM — I77.1 RIGHT ILIAC ARTERY STENOSIS (HCC): Status: ACTIVE | Noted: 2020-09-14

## 2020-09-14 PROBLEM — I51.7 LVH (LEFT VENTRICULAR HYPERTROPHY): Status: ACTIVE | Noted: 2020-09-14

## 2020-09-14 PROCEDURE — 4040F PNEUMOC VAC/ADMIN/RCVD: CPT | Performed by: FAMILY MEDICINE

## 2020-09-14 PROCEDURE — G0008 ADMIN INFLUENZA VIRUS VAC: HCPCS | Performed by: FAMILY MEDICINE

## 2020-09-14 PROCEDURE — 1090F PRES/ABSN URINE INCON ASSESS: CPT | Performed by: FAMILY MEDICINE

## 2020-09-14 PROCEDURE — 1036F TOBACCO NON-USER: CPT | Performed by: FAMILY MEDICINE

## 2020-09-14 PROCEDURE — 90694 VACC AIIV4 NO PRSRV 0.5ML IM: CPT | Performed by: FAMILY MEDICINE

## 2020-09-14 PROCEDURE — G8427 DOCREV CUR MEDS BY ELIG CLIN: HCPCS | Performed by: FAMILY MEDICINE

## 2020-09-14 PROCEDURE — G8419 CALC BMI OUT NRM PARAM NOF/U: HCPCS | Performed by: FAMILY MEDICINE

## 2020-09-14 PROCEDURE — 99214 OFFICE O/P EST MOD 30 MIN: CPT | Performed by: FAMILY MEDICINE

## 2020-09-14 PROCEDURE — 1123F ACP DISCUSS/DSCN MKR DOCD: CPT | Performed by: FAMILY MEDICINE

## 2020-09-14 RX ORDER — ZOSTER VACCINE RECOMBINANT, ADJUVANTED 50 MCG/0.5
0.5 KIT INTRAMUSCULAR SEE ADMIN INSTRUCTIONS
Qty: 0.5 ML | Refills: 1 | Status: CANCELLED | OUTPATIENT
Start: 2020-09-14 | End: 2021-03-13

## 2020-09-14 ASSESSMENT — PATIENT HEALTH QUESTIONNAIRE - PHQ9
1. LITTLE INTEREST OR PLEASURE IN DOING THINGS: 0
SUM OF ALL RESPONSES TO PHQ9 QUESTIONS 1 & 2: 0
SUM OF ALL RESPONSES TO PHQ QUESTIONS 1-9: 0
SUM OF ALL RESPONSES TO PHQ QUESTIONS 1-9: 0
2. FEELING DOWN, DEPRESSED OR HOPELESS: 0

## 2020-09-14 NOTE — Clinical Note
Please call patient to make sure she is doing well with her scalp. We could consider restarting her Xarelto blood thinner due to heart disease specially if she does have any physical therapy to help prevent falls.

## 2020-09-14 NOTE — PROGRESS NOTES
Chief Complaint   Patient presents with    Suture / Staple Removal     3 staples in the scalp    Immunizations     she agrees to high dose flu vaccine today    Heart Problem     Patient has aortic stenosis and peripheral arterial disease, other conditions that are reviewed and noted while we are assessing her need for continued blood thinners       [place] NARRATIVE:    Fell at home 9/5/2020. No LOC, but did have headache. Headache is improved but not completely resolved today. She was seen in the [facility] ER and evaluated including a head CT which was normal and neck CT which was also normal.  Staples were placed in her head without difficulty and she has done well--now 9 days out from staple placement. She did stop her Xarelto however due to bleeding, but not on medical direction that I could see. She is on xarelto for recurrent DVT and not for any arrhythmia. She does have aortic and mitral stenosis. She ahs seen [facility] who would consider surgery. She is on no other medications. Patient is established unless otherwise noted. Above chief complaint and [place] obtained by this physician provider. Review of Systems   Constitutional: Negative for activity change, chills, fatigue and fever. HENT: Negative for congestion. Respiratory: Negative for cough, chest tightness, shortness of breath and wheezing. Cardiovascular: Negative for chest pain and leg swelling. Gastrointestinal: Negative for abdominal pain. Musculoskeletal: Negative for back pain and myalgias. Skin: Positive for wound (Small scalp wound that is nonbleeding and noninfected that needs assessed and staples removed). Negative for rash. Neurological: Positive for headaches. Psychiatric/Behavioral: Negative for behavioral problems and dysphoric mood.        Allergies   Allergen Reactions    Iodine Shortness Of Breath    Bee Pollen Swelling    Tetanus Toxoids Itching    Penicillins Rash     Allergy artery disease) (HCC)    LVH (left ventricular hypertrophy)    Aortic stenosis, moderate    Mitral regurgitation and aortic stenosis    Right iliac artery stenosis (HCC)    Scalp laceration, subsequent encounter  -     External Referral To Physical Therapy    Need for influenza vaccination  -     INFLUENZA, QUADV, ADJUVANTED, 72 YRS =, IM, PF, PREFILL SYR, 0.5ML (FLUAD)    Fall, subsequent encounter  -     External Referral To Physical Therapy    Fall at home, subsequent encounter    Acute nonintractable headache, unspecified headache type    Other orders  -     Cancel: INFLUENZA, HIGH-DOSE, QUADV, 65 YRS +, IM, PF, 0.7ML (FLUZONE)      Problems listed above are stable except as follows: flu vaccine given. To have PT referral for falls prevention. I did not restart her xarelto for now, due to high falls risk. We will call patient in a week or two to make sure she is doing well with her scalp. We could consider restarting her Xarelto blood thinner due to heart disease specially if she does have any physical therapy to help prevent falls. It had been stopped after her fall with scalp laceration. She has not seen cardiology at [facility] or [place] for follow-up on heart disease so we could seek guidance on restarting Xarelto from them as well. I am 50-50 on its risk versus benefit for this lady who fell. Followup will be in the spring unless any problems. Therapeutic plan is unchanged unless otherwise specified. See orders above and comments below for details of workup or medication orders. _________________________________________________  Plan:     Return if symptoms worsen or fail to improve.       Electronically signed by Juan Pablo Real MD on 9/14/20 at 1:21 PM EDT

## 2020-09-16 ENCOUNTER — TELEPHONE (OUTPATIENT)
Dept: FAMILY MEDICINE CLINIC | Age: 85
End: 2020-09-16

## 2020-09-30 ASSESSMENT — ENCOUNTER SYMPTOMS
WHEEZING: 0
CHEST TIGHTNESS: 0
SHORTNESS OF BREATH: 0
COUGH: 0
BACK PAIN: 0
ABDOMINAL PAIN: 0

## 2020-10-13 ENCOUNTER — VIRTUAL VISIT (OUTPATIENT)
Dept: FAMILY MEDICINE CLINIC | Age: 85
End: 2020-10-13
Payer: MEDICARE

## 2020-10-13 PROBLEM — R68.89 FORGETFULNESS: Status: ACTIVE | Noted: 2020-10-13

## 2020-10-13 PROCEDURE — 1123F ACP DISCUSS/DSCN MKR DOCD: CPT | Performed by: NURSE PRACTITIONER

## 2020-10-13 PROCEDURE — 4040F PNEUMOC VAC/ADMIN/RCVD: CPT | Performed by: NURSE PRACTITIONER

## 2020-10-13 PROCEDURE — G8428 CUR MEDS NOT DOCUMENT: HCPCS | Performed by: NURSE PRACTITIONER

## 2020-10-13 PROCEDURE — 1090F PRES/ABSN URINE INCON ASSESS: CPT | Performed by: NURSE PRACTITIONER

## 2020-10-13 PROCEDURE — 99213 OFFICE O/P EST LOW 20 MIN: CPT | Performed by: NURSE PRACTITIONER

## 2020-10-13 ASSESSMENT — ENCOUNTER SYMPTOMS
VOMITING: 0
COUGH: 0
BLOOD IN STOOL: 0
SHORTNESS OF BREATH: 0
DIARRHEA: 0
EYES NEGATIVE: 1
ABDOMINAL PAIN: 0
BACK PAIN: 0
CONSTIPATION: 0
NAUSEA: 0

## 2020-10-13 NOTE — PROGRESS NOTES
10/13/2020    TELEHEALTH EVALUATION -- Audio/Visual (During NOUOR-15 public health emergency)    HPI:    Becky Irene (:  1931) has requested an audio/video evaluation for the following concern(s):      Presents via video with home physical therapist Opal Benavides. Physical therapist Opal Benavides is giving most of HPI for the patient. States he fell about one month ago in her apartment while trying to walk her dog outside. She it her head and was able to get up by herself, but had a bleeding head laceration. She was taken to the ER and received staples to close the laceration. She followed up in PCP office whom ordered home PT. Patient has been doing well with PT, but needs additional diagnosis for her early dementia- mild cognitive impairment in order to continue home PT. She lives at [facility]- independent living. Patient states she does experience forgetfulness on a daily basis. Kolton Lawrence reports that the patient often repeats herself and seems to be forgetful. Patient states she would like to continue physical therapy, but was unable to tell me why she was visiting with me today. She had to have [place] PT give HPI. Per Kolton Lawrence-  Unsteady gait at times- staggering side to side   Forward bent posture. Alia gets under her feet at times, making her trip. Patient denies complaints today  No falls since last encounter one month ago          Review of Systems   Constitutional: Negative for activity change, appetite change, chills, diaphoresis, fatigue, fever and unexpected weight change. Eyes: Negative. Negative for visual disturbance. Respiratory: Negative for cough and shortness of breath. Cardiovascular: Negative for chest pain, palpitations and leg swelling. Gastrointestinal: Negative for abdominal pain, blood in stool, constipation, diarrhea, nausea and vomiting. Endocrine: Negative. Musculoskeletal: Positive for arthralgias and gait problem (unsteady at times, poor posture).  Negative for back pain.   Neurological: Negative for dizziness, weakness, light-headedness, numbness and headaches. Psychiatric/Behavioral:        Intermittent confusion- forgetfullness       Prior to Visit Medications    Not on File       Social History     Tobacco Use    Smoking status: Former Smoker    Smokeless tobacco: Never Used   Substance Use Topics    Alcohol use: No    Drug use: No        PHYSICAL EXAMINATION:  Vital Signs: (As obtained by patient/caregiver or practitioner observation)    Blood pressure-  Heart rate-    Respiratory rate-    Temperature-  Pulse oximetry-     Physical Exam  Vitals signs reviewed. Constitutional:       General: She is not in acute distress. Appearance: Normal appearance. She is normal weight. She is not ill-appearing, toxic-appearing or diaphoretic. HENT:      Head: Normocephalic and atraumatic. Nose: Nose normal.   Eyes:      Extraocular Movements: Extraocular movements intact. Pupils: Pupils are equal, round, and reactive to light. Neck:      Musculoskeletal: Normal range of motion. Pulmonary:      Effort: Pulmonary effort is normal.   Musculoskeletal: Normal range of motion. Comments: Sitting in chair    Skin:     Coloration: Skin is not pale. Neurological:      Mental Status: She is alert and oriented to person, place, and time. Mental status is at baseline. Psychiatric:         Mood and Affect: Mood normal.         Thought Content: Thought content normal.         ASSESSMENT/PLAN:  1. Fall at home, subsequent encounter    2. Age-related osteoporosis without current pathological fracture    3. Physical deconditioning    4. MCI (mild cognitive impairment)    5. Forgetfulness    There is a medical necessity to continue home physical therapy due to the above diagnosis'. Patient would benefit from fall prevention reminders, guided physical activity and physical strengthening exercises with PT.    This appointment was a face to face visit in order to continue home PT. Follow up PRN. Present to the ER for any emergent or acute symptoms not managed at home or in office. Atilio Cifuentes is a 80 y.o. female being evaluated by a Virtual Visit (video visit) encounter to address concerns as mentioned above. A caregiver was present when appropriate. Due to this being a TeleHealth encounter (During FNHOY-08 public health emergency), evaluation of the following organ systems was limited: Vitals/Constitutional/EENT/Resp/CV/GI//MS/Neuro/Skin/Heme-Lymph-Imm. Pursuant to the emergency declaration under the [address] [place] [address], [address] [place] [address] authority and the [employer] Act, this Virtual Visit was conducted with patient's (and/or legal guardian's) consent, to reduce the patient's risk of exposure to COVID-19 and provide necessary medical care. The patient (and/or legal guardian) has also been advised to contact this office for worsening conditions or problems, and seek emergency medical treatment and/or call 911 if deemed necessary. Patient identification was verified at the start of the visit: Yes    Total time spent on this encounter: 15 minutes    Services were provided through a video synchronous discussion virtually to substitute for in-person clinic visit. Patient and provider were located at their individual homes. --TOBY Hidalgo NP on 10/13/2020 at 11:05 AM    An electronic signature was used to authenticate this note.

## 2020-12-08 ENCOUNTER — OFFICE VISIT (OUTPATIENT)
Dept: FAMILY MEDICINE CLINIC | Age: 85
End: 2020-12-08
Payer: MEDICARE

## 2020-12-08 VITALS
BODY MASS INDEX: 15.33 KG/M2 | TEMPERATURE: 97.3 F | OXYGEN SATURATION: 100 % | SYSTOLIC BLOOD PRESSURE: 142 MMHG | HEART RATE: 73 BPM | WEIGHT: 95 LBS | DIASTOLIC BLOOD PRESSURE: 82 MMHG

## 2020-12-08 DIAGNOSIS — F09 PROGRESSIVE COGNITIVE DYSFUNCTION: ICD-10-CM

## 2020-12-08 DIAGNOSIS — G31.84 MCI (MILD COGNITIVE IMPAIRMENT): ICD-10-CM

## 2020-12-08 LAB
A/G RATIO: 1.8 (ref 1.1–2.2)
ALBUMIN SERPL-MCNC: 4.7 G/DL (ref 3.4–5)
ALP BLD-CCNC: 85 U/L (ref 40–129)
ALT SERPL-CCNC: 17 U/L (ref 10–40)
ANION GAP SERPL CALCULATED.3IONS-SCNC: 14 MMOL/L (ref 3–16)
AST SERPL-CCNC: 21 U/L (ref 15–37)
BASOPHILS ABSOLUTE: 0 K/UL (ref 0–0.2)
BASOPHILS RELATIVE PERCENT: 0.9 %
BILIRUB SERPL-MCNC: 0.5 MG/DL (ref 0–1)
BUN BLDV-MCNC: 20 MG/DL (ref 7–20)
CALCIUM SERPL-MCNC: 9.6 MG/DL (ref 8.3–10.6)
CHLORIDE BLD-SCNC: 101 MMOL/L (ref 99–110)
CO2: 26 MMOL/L (ref 21–32)
CREAT SERPL-MCNC: 0.7 MG/DL (ref 0.6–1.2)
EOSINOPHILS ABSOLUTE: 0.1 K/UL (ref 0–0.6)
EOSINOPHILS RELATIVE PERCENT: 1.3 %
FOLATE: 17.13 NG/ML (ref 4.78–24.2)
GFR AFRICAN AMERICAN: >60
GFR NON-AFRICAN AMERICAN: >60
GLOBULIN: 2.6 G/DL
GLUCOSE BLD-MCNC: 111 MG/DL (ref 70–99)
HCT VFR BLD CALC: 38.7 % (ref 36–48)
HEMOGLOBIN: 12.5 G/DL (ref 12–16)
LYMPHOCYTES ABSOLUTE: 0.7 K/UL (ref 1–5.1)
LYMPHOCYTES RELATIVE PERCENT: 17 %
MCH RBC QN AUTO: 27.8 PG (ref 26–34)
MCHC RBC AUTO-ENTMCNC: 32.3 G/DL (ref 31–36)
MCV RBC AUTO: 86.2 FL (ref 80–100)
MONOCYTES ABSOLUTE: 0.4 K/UL (ref 0–1.3)
MONOCYTES RELATIVE PERCENT: 8.8 %
NEUTROPHILS ABSOLUTE: 3.1 K/UL (ref 1.7–7.7)
NEUTROPHILS RELATIVE PERCENT: 72 %
PDW BLD-RTO: 15.2 % (ref 12.4–15.4)
PLATELET # BLD: 108 K/UL (ref 135–450)
PMV BLD AUTO: 11 FL (ref 5–10.5)
POTASSIUM SERPL-SCNC: 4.5 MMOL/L (ref 3.5–5.1)
RBC # BLD: 4.49 M/UL (ref 4–5.2)
SODIUM BLD-SCNC: 141 MMOL/L (ref 136–145)
TOTAL PROTEIN: 7.3 G/DL (ref 6.4–8.2)
TSH SERPL DL<=0.05 MIU/L-ACNC: 11.86 UIU/ML (ref 0.27–4.2)
VITAMIN B-12: 488 PG/ML (ref 211–911)
WBC # BLD: 4.3 K/UL (ref 4–11)

## 2020-12-08 PROCEDURE — 1036F TOBACCO NON-USER: CPT | Performed by: FAMILY MEDICINE

## 2020-12-08 PROCEDURE — G8427 DOCREV CUR MEDS BY ELIG CLIN: HCPCS | Performed by: FAMILY MEDICINE

## 2020-12-08 PROCEDURE — 1090F PRES/ABSN URINE INCON ASSESS: CPT | Performed by: FAMILY MEDICINE

## 2020-12-08 PROCEDURE — G8419 CALC BMI OUT NRM PARAM NOF/U: HCPCS | Performed by: FAMILY MEDICINE

## 2020-12-08 PROCEDURE — G8484 FLU IMMUNIZE NO ADMIN: HCPCS | Performed by: FAMILY MEDICINE

## 2020-12-08 PROCEDURE — 99214 OFFICE O/P EST MOD 30 MIN: CPT | Performed by: FAMILY MEDICINE

## 2020-12-08 PROCEDURE — G0439 PPPS, SUBSEQ VISIT: HCPCS | Performed by: FAMILY MEDICINE

## 2020-12-08 PROCEDURE — 4040F PNEUMOC VAC/ADMIN/RCVD: CPT | Performed by: FAMILY MEDICINE

## 2020-12-08 PROCEDURE — 1123F ACP DISCUSS/DSCN MKR DOCD: CPT | Performed by: FAMILY MEDICINE

## 2020-12-08 RX ORDER — SERTRALINE HYDROCHLORIDE 25 MG/1
25 TABLET, FILM COATED ORAL DAILY
Qty: 30 TABLET | Refills: 5 | Status: SHIPPED | OUTPATIENT
Start: 2020-12-08 | End: 2021-02-09 | Stop reason: SDUPTHER

## 2020-12-08 ASSESSMENT — ENCOUNTER SYMPTOMS
WHEEZING: 0
COUGH: 0
BACK PAIN: 0
ABDOMINAL PAIN: 0
CHEST TIGHTNESS: 0
SHORTNESS OF BREATH: 0

## 2020-12-08 ASSESSMENT — PATIENT HEALTH QUESTIONNAIRE - PHQ9
SUM OF ALL RESPONSES TO PHQ QUESTIONS 1-9: 0
SUM OF ALL RESPONSES TO PHQ QUESTIONS 1-9: 0
1. LITTLE INTEREST OR PLEASURE IN DOING THINGS: 0
SUM OF ALL RESPONSES TO PHQ QUESTIONS 1-9: 0
2. FEELING DOWN, DEPRESSED OR HOPELESS: 0
SUM OF ALL RESPONSES TO PHQ9 QUESTIONS 1 & 2: 0

## 2020-12-08 ASSESSMENT — LIFESTYLE VARIABLES: HOW OFTEN DO YOU HAVE A DRINK CONTAINING ALCOHOL: 0

## 2020-12-08 NOTE — PROGRESS NOTES
Medicare Annual Wellness Visit  Name: Jenny Suh Date: 2020   MRN: [id number] Sex: Female   Age: 80 y.o. Ethnicity: Non-/Non    : 1931 Race: Anatoly López is here for Medicare AWV (here primarily for AWV service); Memory Loss (cognitive issues are marginally worse, not on medication); and Anxiety (anxiety and paranoia are worse, patient has reported believing people have stolen from her apartment or are outside her apartment)    Screenings for behavioral, psychosocial and functional/safety risks, and cognitive dysfunction are all negative except as indicated below. These results, as well as other patient data from the [employer] form, are documented in Flowsheets linked to this Encounter. Pertinent positives: Normal MMSE but abnormal clock drawing test.  Screens negative for depression. She does sometimes eat fewer than 2 meals a day and has not seen the dentist nor had an eye exam.  She does have tripping hazards with throw rugs in her home. She has difficulties with transportation. She does have a living will on file. Allergies   Allergen Reactions    Iodine Shortness Of Breath    Bee Pollen Swelling    Tetanus Toxoids Itching    Penicillins Rash         Prior to Visit Medications    Medication Sig Taking?  Authorizing Provider   rivaroxaban (XARELTO) 15 MG TABS tablet Take 1 tablet by mouth daily Yes Alexa Vo MD         Past Medical History:   Diagnosis Date    Aortic valvular disease     Asthma     B12 deficiency     COPD [facility]ch[facility] obstructive pulmonary disease) ([facility] [address]     Dementia (HCC)     DVT (deep vein thrombosis) in pregnancy     H/O Doppler ABD [place] ultrasound 2019    NO evidence of AAA within the visualized portions of the abdominal aorta, possible right iliac artery stenosis    H/O Doppler lower venous ultrasound 2019    NO DVT OR SVT, No significant reflux in right, Significant reflux in Left CFV    H/O echocardiogram 06/05/2019    EF 45%, Moderate basal anteroseptal wall asymmetrical left ventricular hypertrophy, Left atrium mild to mod dilated, Mild to Mod AS, Mitral stenosis is severe, Mod AR and Mod Pulmonic regurg, Mild MR, TR, Mod Pulm HTN    Hyperlipidemia     Hypertension     Hypothyroidism     Iron deficiency     Osteoarthritis     Peripheral neuropathy     on EMG    Pulmonary nodule     thought benign    Seasonal allergies     Spinal stenosis     Type 2 diabetes mellitus without complication ([address]        Past Surgical History:   Procedure Laterality Date    APPENDECTOMY     [address]    had teeth pulled    EYE SURGERY      KNEE SURGERY Right 1990    LYMPHADENECTOMY  1953    lymph gland removed from neck    NOSE SURGERY      broken nose    ROTATOR CUFF REPAIR Left 2005    surgery    KAREN AND BSO  1971    THORACIC [address]    removed 3 ribs    TONSILLECTOMY AND ADENOIDECTOMY           Family History   Problem Relation Age of Onset    High Blood Pressure Mother     Heart Disease Mother     Stroke Mother     Osteoporosis Mother     High Blood Pressure Father     Heart Disease Father     Stroke Father     Cancer Sister     Heart Disease Brother     Cancer Brother        CareTeam (Including outside providers/suppliers regularly involved in providing care):   Patient Care Team:  Lorraine Brown MD as PCP - General (Family Medicine)  Lorraine Brown MD as PCP - [address] [place], [place] [address]rae Faustled Provider    Wt Readings from Last 3 Encounters:   12/08/20 95 lb (43.1 kg)   09/14/20 93 lb 3.2 oz (42.3 kg)   09/05/20 102 lb (46.3 kg)     Vitals:    12/08/20 1138   BP: (!) 142/82   Site: Left Upper Arm   Position: Sitting   Cuff Size: Child   Pulse: 73   Temp: 97.3 °F (36.3 °C)   TempSrc: Temporal   SpO2: 100%   Weight: 95 lb (43.1 kg)     Body mass index is 15.33 kg/m².     Based upon direct observation of the patient, evaluation of cognition reveals some memory impairment

## 2020-12-08 NOTE — PATIENT INSTRUCTIONS
Personalized Preventive Plan for [place] - 12/8/2020  Medicare offers a range of preventive health benefits. Some of the tests and screenings are paid in full while other may be subject to a deductible, co-insurance, and/or copay. Some of these benefits include a comprehensive review of your medical history including lifestyle, illnesses that may run in your family, and various assessments and screenings as appropriate. After reviewing your medical record and screening and assessments performed today your provider may have ordered immunizations, labs, imaging, and/or referrals for you. A list of these orders (if applicable) as well as your Preventive Care list are included within your After Visit Summary for your review. Other Preventive Recommendations:    · A preventive eye exam performed by an eye specialist is recommended every 1-2 years to screen for glaucoma; cataracts, macular degeneration, and other eye disorders. · A preventive dental visit is recommended every 6 months. · Try to get at least 150 minutes of exercise per week or 10,000 steps per day on a pedometer . · Order or download the FREE \"Exercise & Physical Activity: Your Everyday Guide\" from [employer]. Call 3-931.127.2557 or search [employer] online. · You need 4758-0021 mg of calcium and 4091-6363 IU of vitamin D per day. It is possible to meet your calcium requirement with diet alone, but a vitamin D supplement is usually necessary to meet this goal.  · When exposed to the sun, use a sunscreen that protects against both UVA and UVB radiation with an SPF of 30 or greater. Reapply every 2 to 3 hours or after sweating, drying off with a towel, or swimming. · Always wear a seat belt when traveling in a car. Always wear a helmet when riding a bicycle or motorcycle.

## 2020-12-08 NOTE — PROGRESS NOTES
Chief Complaint   Patient presents with   [facility] AWV     here primarily for AWV service    Memory Loss     cognitive issues are marginally worse, not on medication    Anxiety     anxiety and paranoia are worse, patient has reported believing people have stolen from her apartment or are outside her apartment       [address] today but family brings issues as above. Only medication is xarelto. Is very unsteady per previous virtual visit with PT assisting. Patient is accompanied today by her medical decision [address] who I have met and chatted with many times before and another friend. Patient has no living relatives. Patient is in independent living apartment at an adult living complex and the management believes she is unsafe at her independent living apartment and she continues to complain to multiple persons about her upstairs neighbor stealing items from her apartment. Patient believes she is being forced out of her apartment for her upstairs neighbor to allow her sons to live in her apartment instead she is resistant to moving. She is also resistant to medication that has been offered several times her anxiety and paranoia. She has moderately progressive dementia. Her family friend Isabella Benton also has installed wireless cameras in her apartment and has monitored several times and has not seen anyone break into the apartment or steal from her. So there is no evidence. There is a prior virtual visit with patient at her independent living apartment and a physical therapist there to his sister. She had a severe fall earlier this year resulting in a large scalp laceration requiring stitches. This spurred consideration of discontinuation of Xarelto, which we decided against, and physical therapy consult and treatment. She has not fallen since then, per her and her family friend today.     Patient exhibits clear symptoms of cognitive loss and anxiety Readings from Last 3 Encounters:   12/08/20 (!) 142/82   09/14/20 124/82   09/05/20 (!) 142/64     Wt Readings from Last 3 Encounters:   12/08/20 95 lb (43.1 kg)   09/14/20 93 lb 3.2 oz (42.3 kg)   09/05/20 102 lb (46.3 kg)     Body mass index is 15.33 kg/m². No results found for this visit on 12/08/20. Physical Exam  Vitals signs and nursing note reviewed. Constitutional:       General: She is not in acute distress. Appearance: She is well-developed. She is not diaphoretic. Comments: Patient appears frail and has lost weight from previous visit   HENT:      Head: Normocephalic and atraumatic. Eyes:      Conjunctiva/sclera: Conjunctivae normal.      Pupils: Pupils are equal, round, and reactive to light. Cardiovascular:      Rate and Rhythm: Normal rate and regular rhythm. Pulmonary:      Effort: Pulmonary effort is normal. No respiratory distress. Skin:     General: Skin is warm and dry. Neurological:      Mental Status: She is alert and oriented to person, place, and time. Psychiatric:         Behavior: Behavior normal.             _________________________________________________  Assessment:     Mendel Benton was seen today for medicare awv, memory loss and anxiety. Diagnoses and all orders for this visit:    Routine general medical examination at a health care facility    MCI (mild cognitive impairment)  -     TSH without Reflex; Future  -     CBC Auto Differential; Future  -     COMPREHENSIVE METABOLIC PANEL; Future  -     VITAMIN B12 & FOLATE; Future    Progressive cognitive dysfunction  -     TSH without Reflex; Future  -     CBC Auto Differential; Future  -     COMPREHENSIVE METABOLIC PANEL; Future  -     VITAMIN B12 & FOLATE; Future    Paranoia (HCC)  -     sertraline (ZOLOFT) 25 MG tablet; Take 1 tablet by mouth daily    Anxiety  -     sertraline (ZOLOFT) 25 MG tablet; Take 1 tablet by mouth daily    Weight loss      Problems listed above are stable except as follows:  So we will check labs to assess for any reversible causes of cognitive loss. We will try Zoloft for paranoia and anxiety as above. She is strongly encouraged to eat more regularly to maintain or regain some weight. She would be a candidate for memory medication like Aricept or Namenda or Exelon but we will take that step the next time. More critically we need to improve her paranoia. Therapeutic plan is unchanged unless otherwise specified. See orders above and comments below for details of workup or medication orders. _________________________________________________  Plan:     Trial of low-dose Zoloft as above. Her medical decision maker Rajendra Gambino may arrange for an evaluation at a center for aging in [place] to get definitive and unbiased opinion about her safety in home. If she decides to move forward with that referral that is just fine with me and I would agree with it but she should probably request records from so we can give medication history and lab results for their benefit. Patient would really prefer to stay in her current apartment as it is convenient for her and her poodle dog Star so we will try to arrange this. Return in 2 months (on 2/8/2021), or if symptoms worsen or fail to improve, for Medicare Annual Wellness Visit in 1 year, recheck in two months.       Electronically signed by Luz Blackburn MD on 12/8/20 at 11:46 AM EST

## 2020-12-09 RX ORDER — LEVOTHYROXINE SODIUM 0.05 MG/1
50 TABLET ORAL DAILY
Qty: 30 TABLET | Refills: 5 | Status: SHIPPED | OUTPATIENT
Start: 2020-12-09 | End: 2021-02-09 | Stop reason: SDUPTHER

## 2020-12-09 NOTE — TELEPHONE ENCOUNTER
Called and spoke with Merary Barton of  and advised her of the patients  labs. She stated she will need to speak with Korina Ji about going on a low dose of levothyroxine 50 mcg.  She also stated she will call Cara Cruz to see how much it is going to cost.

## 2021-02-09 ENCOUNTER — OFFICE VISIT (OUTPATIENT)
Dept: FAMILY MEDICINE CLINIC | Age: 86
End: 2021-02-09
Payer: MEDICARE

## 2021-02-09 VITALS
WEIGHT: 90 LBS | TEMPERATURE: 96.9 F | OXYGEN SATURATION: 100 % | BODY MASS INDEX: 14.53 KG/M2 | SYSTOLIC BLOOD PRESSURE: 140 MMHG | HEART RATE: 89 BPM | DIASTOLIC BLOOD PRESSURE: 90 MMHG

## 2021-02-09 DIAGNOSIS — E11.51 PERIPHERAL CIRCULATORY DISORDER DUE TO TYPE 2 DIABETES MELLITUS (HCC): ICD-10-CM

## 2021-02-09 DIAGNOSIS — F22 PARANOIA (HCC): ICD-10-CM

## 2021-02-09 DIAGNOSIS — F41.9 ANXIETY: Primary | ICD-10-CM

## 2021-02-09 DIAGNOSIS — Z91.81 AT HIGH RISK FOR FALLS: ICD-10-CM

## 2021-02-09 DIAGNOSIS — I73.9 PAD (PERIPHERAL ARTERY DISEASE) (HCC): ICD-10-CM

## 2021-02-09 DIAGNOSIS — R63.4 WEIGHT LOSS: ICD-10-CM

## 2021-02-09 DIAGNOSIS — Z86.718 HISTORY OF RECURRENT DEEP VEIN THROMBOSIS (DVT): ICD-10-CM

## 2021-02-09 PROCEDURE — G8484 FLU IMMUNIZE NO ADMIN: HCPCS | Performed by: FAMILY MEDICINE

## 2021-02-09 PROCEDURE — 4040F PNEUMOC VAC/ADMIN/RCVD: CPT | Performed by: FAMILY MEDICINE

## 2021-02-09 PROCEDURE — G8419 CALC BMI OUT NRM PARAM NOF/U: HCPCS | Performed by: FAMILY MEDICINE

## 2021-02-09 PROCEDURE — 1036F TOBACCO NON-USER: CPT | Performed by: FAMILY MEDICINE

## 2021-02-09 PROCEDURE — 99214 OFFICE O/P EST MOD 30 MIN: CPT | Performed by: FAMILY MEDICINE

## 2021-02-09 PROCEDURE — G8427 DOCREV CUR MEDS BY ELIG CLIN: HCPCS | Performed by: FAMILY MEDICINE

## 2021-02-09 PROCEDURE — 1090F PRES/ABSN URINE INCON ASSESS: CPT | Performed by: FAMILY MEDICINE

## 2021-02-09 PROCEDURE — 1123F ACP DISCUSS/DSCN MKR DOCD: CPT | Performed by: FAMILY MEDICINE

## 2021-02-09 RX ORDER — LEVOTHYROXINE SODIUM 0.05 MG/1
50 TABLET ORAL DAILY
Qty: 30 TABLET | Refills: 5 | Status: SHIPPED | OUTPATIENT
Start: 2021-02-09 | End: 2021-09-13 | Stop reason: DRUGHIGH

## 2021-02-09 ASSESSMENT — PATIENT HEALTH QUESTIONNAIRE - PHQ9
SUM OF ALL RESPONSES TO PHQ QUESTIONS 1-9: 0
SUM OF ALL RESPONSES TO PHQ QUESTIONS 1-9: 0

## 2021-02-09 NOTE — PROGRESS NOTES
Chief Complaint   Patient presents with    Other     2 month follow up on mood and thyroid    Weight Loss     pt has lost 5# since early December, BMI 14    Fall     high falls risk score       Douglas NARRATIVE:    Medications not filled since early December, according to reconcile, but patient and her friend Zahira De La Rosa state she is compliant. Medication seems to have reduced her paranoia ad she is [facility] at [facility]. She continues to have high falls risk, but remains a bit active walking her dog and attending more meals at the [facility]. However weight down 5#, BP is elevated also. Patient is established unless otherwise noted. Above chief complaint and Douglas obtained by this physician provider. Review of Systems   Constitutional: Positive for unexpected weight change. Negative for activity change, chills, fatigue and fever. HENT: Negative for congestion. Respiratory: Negative for cough, chest tightness, shortness of breath and wheezing. Cardiovascular: Negative for chest pain and leg swelling. Gastrointestinal: Negative for abdominal pain. Musculoskeletal: Negative for back pain and myalgias. Skin: Negative for rash. Psychiatric/Behavioral: Negative for behavioral problems and dysphoric mood. Allergies   Allergen Reactions    Iodine Shortness Of Breath    Bee Pollen Swelling    Tetanus Toxoids Itching    Penicillins Rash     Allergy historyupdated. Outpatient Medications Marked as Taking for the 2/9/21 encounter (Office Visit) with Dominguez Willett MD   Medication Sig Dispense Refill    levothyroxine (SYNTHROID) 50 MCG tablet Take 1 tablet by mouth daily 30 tablet 5    sertraline (ZOLOFT) 50 MG tablet Take 1 tablet by mouth daily 30 tablet 5    rivaroxaban (XARELTO) 15 MG TABS tablet Take 1 tablet by mouth daily 90 tablet 1       Tobacco use history updated.    Social History     Tobacco Use   Smoking Status Former Smoker   Smokeless Tobacco Never Used thrombosis (DVT)  -     rivaroxaban (XARELTO) 15 MG TABS tablet; Take 1 tablet by mouth daily, Disp-90 tablet, R-1Print    Problems listed above are stable and therapeutic plan is unchanged unless otherwise specified. Her weight loss is worrisome but other issues seem to be going pretty well. See orders above and comments below for details of workup or medication orders. _________________________________________________  Plan:     Return in about 2 months (around 4/9/2021), or if symptoms worsen or fail to improve, for recheck chronic medical problem(s), TSH. Electronically signed by Huyen Varner MD on 2/9/21 at 9:47 AM EST  On the basis of positive falls risk screening, assessment and plan is as follows: home safety tips provided.

## 2021-02-28 ASSESSMENT — ENCOUNTER SYMPTOMS
CHEST TIGHTNESS: 0
SHORTNESS OF BREATH: 0
BACK PAIN: 0
WHEEZING: 0
ABDOMINAL PAIN: 0
COUGH: 0

## 2021-03-10 ENCOUNTER — TELEPHONE (OUTPATIENT)
Dept: FAMILY MEDICINE CLINIC | Age: 86
End: 2021-03-10

## 2021-03-10 NOTE — TELEPHONE ENCOUNTER
Naa Mcdowell (on HIPAA and POA) called in asking about PARKER for [facility] hospice for this patient. Patient per Jayson Graham is needing additional assistance at [facility]. Jayson Graham states that the goal for the patient is to still be in independent living and just have more help. Explained the process of release of info and how to go about it. [place] Rebekalatesha will contact the hospice representative and get things morning. I will be on the look out for a signed Andrés Curryon from them via fax.

## 2021-03-11 ENCOUNTER — TELEPHONE (OUTPATIENT)
Dept: FAMILY MEDICINE CLINIC | Age: 86
End: 2021-03-11

## 2021-03-11 NOTE — TELEPHONE ENCOUNTER
Rep Hadley Hernandez from Hospice (see last telephone encounter) came into office today and asked about PARKER due to being short staffed I am not in my normal position advised him that I wont be in my normal position to complete this until Monday. He verified understanding gave me his card. If I get a spare moment I will go ahead and try to find PARKER in all the faxes and get this completed.

## 2021-03-15 ENCOUNTER — TELEPHONE (OUTPATIENT)
Dept: FAMILY MEDICINE CLINIC | Age: 86
End: 2021-03-15

## 2021-03-15 NOTE — TELEPHONE ENCOUNTER
Rep from [facility] came in today asking about records again. Due to the extensive release and nature of it.  I sent to [id number]

## 2021-03-17 ENCOUNTER — TELEPHONE (OUTPATIENT)
Dept: FAMILY MEDICINE CLINIC | Age: 86
End: 2021-03-17

## 2021-03-25 ENCOUNTER — TELEPHONE (OUTPATIENT)
Dept: FAMILY MEDICINE CLINIC | Age: 86
End: 2021-03-25

## 2021-03-25 NOTE — TELEPHONE ENCOUNTER
Received call today marco antonio at hospice and she stated that they need verbal from pcp to start hospice and then once she is signed on they will have the provider through hospice take care of patient.     Verbal given

## 2021-03-30 ENCOUNTER — TELEPHONE (OUTPATIENT)
Dept: FAMILY MEDICINE CLINIC | Age: 86
End: 2021-03-30

## 2021-03-30 NOTE — TELEPHONE ENCOUNTER
So this was not unexpected but I was hoping she was doing ok. It may be time to move to assisted living. If she is off the synthroid she will get worse--more depressed and tired and less alert. Cognition cecelia decline without levothyroxine.

## 2021-03-30 NOTE — TELEPHONE ENCOUNTER
Hebert Chew pt POA called and stated that pt has had 2 different eval at independent living and both stated that pt is not safe by herself. They have stated that pt is not taking her medication. She has insisted on her doing it herself but she is not able to remember to take them katy sp to independent living and stated that it would be best if pcp would remove meds from pt chart since she is not taking them anyway.  Hebert Chew stated that this may be something they need to speak pcp about at a visit and katy pt POA did not mind to make an appt

## 2021-03-31 NOTE — TELEPHONE ENCOUNTER
Monica Gardiner stated that they are looking for long term care at this point but pt is refusing medication at this point.  They are going to make an appt to discuss with pcp

## 2021-03-31 NOTE — TELEPHONE ENCOUNTER
Katy pt POA called back to cancel appt as they are going to move katy where she can get the help that she needs.  poa did not want to r/s or stop any medication

## 2021-04-04 ENCOUNTER — APPOINTMENT (OUTPATIENT)
Dept: GENERAL RADIOLOGY | Age: 86
End: 2021-04-04
Payer: MEDICARE

## 2021-04-04 ENCOUNTER — APPOINTMENT (OUTPATIENT)
Dept: CT IMAGING | Age: 86
End: 2021-04-04
Payer: MEDICARE

## 2021-04-04 ENCOUNTER — HOSPITAL ENCOUNTER (EMERGENCY)
Age: 86
Discharge: HOME | End: 2021-04-04
Payer: MEDICARE

## 2021-04-04 VITALS
TEMPERATURE: 98.3 F | RESPIRATION RATE: 15 BRPM | HEIGHT: 65 IN | OXYGEN SATURATION: 96 % | SYSTOLIC BLOOD PRESSURE: 155 MMHG | HEART RATE: 77 BPM | DIASTOLIC BLOOD PRESSURE: 69 MMHG | BODY MASS INDEX: 16.66 KG/M2 | WEIGHT: 100 LBS
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
## 2021-04-04 DIAGNOSIS — M79.601 RIGHT ARM PAIN: ICD-10-CM

## 2021-04-04 DIAGNOSIS — M79.604 RIGHT LEG PAIN: ICD-10-CM

## 2021-04-04 PROCEDURE — 72170 X-RAY EXAM OF PELVIS: CPT

## 2021-04-04 PROCEDURE — 70450 CT HEAD/BRAIN W/O DYE: CPT

## 2021-04-04 PROCEDURE — 6370000000 HC RX 637 (ALT 250 FOR IP): Performed by: PHYSICIAN ASSISTANT

## 2021-04-04 PROCEDURE — 71250 CT THORAX DX C-: CPT

## 2021-04-04 PROCEDURE — 72125 CT NECK SPINE W/O DYE: CPT

## 2021-04-04 PROCEDURE — 73552 X-RAY EXAM OF FEMUR 2/>: CPT

## 2021-04-04 PROCEDURE — 73590 X-RAY EXAM OF LOWER LEG: CPT

## 2021-04-04 PROCEDURE — 71045 X-RAY EXAM CHEST 1 VIEW: CPT

## 2021-04-04 PROCEDURE — 73090 X-RAY EXAM OF FOREARM: CPT

## 2021-04-04 PROCEDURE — 99285 EMERGENCY DEPT VISIT HI MDM: CPT

## 2021-04-04 RX ORDER — HYDROCODONE BITARTRATE AND ACETAMINOPHEN 5; 325 MG/1; MG/1
0.5 TABLET ORAL ONCE
Status: COMPLETED | OUTPATIENT
Start: 2021-04-04 | End: 2021-04-04

## 2021-04-04 RX ADMIN — HYDROCODONE BITARTRATE AND ACETAMINOPHEN 0.5 TABLET: 5; 325 TABLET ORAL at 11:17

## 2021-04-04 ASSESSMENT — PAIN SCALES - GENERAL
PAINLEVEL_OUTOF10: 6
PAINLEVEL_OUTOF10: 6

## 2021-04-04 ASSESSMENT — PAIN DESCRIPTION - ONSET: ONSET: ON-GOING

## 2021-04-04 ASSESSMENT — PAIN DESCRIPTION - FREQUENCY: FREQUENCY: CONTINUOUS

## 2021-04-04 NOTE — ED NOTES
Patient ambulated to that bathroom at this time with the assist of this RN.  Urine sample obtained and sent down to lab for UA     Daniel Lam RN  04/04/21 2257

## 2021-04-04 NOTE — ED TRIAGE NOTES
Patient to the ED s/p falling from bed onto the floor. Patient denies hitting head, denies LOC, does take xarelto. Patient reports that she did hit elbow and knee, does complain of right elbow and right knee pain. 6/10.

## 2021-04-04 NOTE — ED PROVIDER NOTES
Patient Identification  Maximino Oleary is a 80 y.o. female    Chief Complaint  Fall (fall from bed, unknown if hit head, no LOC, takes xarelto ), Arm Pain (right pain ), and Knee Pain (right pain)      HPI  (History provided by patient)  This is a 80 y.o. female who was brought in by EMS for chief complaint of fall, right arm and leg pain. Onset was just prior to arrival.  Patient reports that she was asleep and she thinks she rolled out of her bed. Reports pain in the right forearm as well as the entire right leg. Pain is 6 out of 10 and aching and constant. Denies hitting her head or any head injury. She denies being on any blood thinners, med list notes that she is supposed to be on Xarelto. REVIEW OF SYSTEMS    Constitutional:  Denies fever, chills  HENT:  Denies sore throat or ear pain   Eyes: Denies vision changes, eye pain  Cardiovascular:  Denies chest pain, syncope  Respiratory:  Denies shortness of breath, cough   GI:  Denies abdominal pain, nausea, vomiting  :  Denies dysuria, discharge  Musculoskeletal:  Denies back pain.  + R arm and leg pain  Skin:  Denies rash, pruritis  Neurologic:  Denies headache, focal weakness, or sensory changes     See HPI and nursing notes for additional information     I have reviewed the following nursing documentation:  Allergies:    Allergies   Allergen Reactions    Iodine Shortness Of Breath    Bee Pollen Swelling    Tetanus Toxoids Itching    Penicillins Rash       Past medical history:  has a past medical history of Aortic valvular disease, Asthma, B12 deficiency, COPD (chronic obstructive pulmonary disease) ([facility]), Dementia ([facility] [address] DVT (deep vein thrombosis) in pregnancy, H/O Doppler ABD Aortia ultrasound (06/05/2019), H/O Doppler lower venous ultrasound (06/05/2019), H/O echocardiogram (06/05/2019), Hyperlipidemia, Hypertension, Hypothyroidism, Iron deficiency, Osteoarthritis, Peripheral neuropathy, Pulmonary nodule, Seasonal allergies, Spinal stenosis, and Type 2 diabetes mellitus without complication ([facility] [address] Past surgical history:  has a past surgical history that includes Appendectomy; eye surgery; ольга and bso (cervix removed) (1971); knee surgery (Right, 1990); lymphadenectomy (6234); Nose surgery; Tonsillectomy and adenoidectomy; Breast biopsy; Rotator cuff repair (Left, 2005); Dental surgery (1963); and Thoracic disc surgery (1976). Home medications:   Prior to Admission medications    Medication Sig Start Date End Date Taking? Authorizing Provider   levothyroxine (SYNTHROID) 50 MCG tablet Take 1 tablet by mouth daily 2/9/21   Everett Castano MD   sertraline (ZOLOFT) 50 MG tablet Take 1 tablet by mouth daily 2/9/21   Everett Castano MD   rivaroxaban Jessica Pratt) 15 MG TABS tablet Take 1 tablet by mouth daily 2/9/21   Everett Castano MD       Social history:  reports that she has quit smoking. She has never used smokeless tobacco. She reports that she does not drink alcohol or use drugs. Family history:    Family History   Problem Relation Age of Onset    High Blood Pressure Mother     Heart Disease Mother     Stroke Mother     Osteoporosis Mother     High Blood Pressure Father     Heart Disease Father     Stroke Father     Cancer Sister     Heart Disease Brother     Cancer Brother          Exam  BP (!) 155/69   Pulse 77   Temp 98.3 °F (36.8 °C) (Oral)   Resp 15   Ht 5' 5\" (1.651 m)   Wt 100 lb (45.4 kg)   SpO2 96%   BMI 16.64 kg/m²   Nursing note and vitals reviewed. Constitutional: Well developed, well nourished. No acute distress. HENT:      Head: Normocephalic and atraumatic. Ears: External ears normal.      Nose: Nose normal.     Mouth: Membrane mucosa moist and pink. No posterior oropharynx erythema or tonsillar edema  Eyes: Anicteric sclera. No discharge, PERRL  Neck: Supple. Trachea midline. Cardiovascular: RRR, no murmurs, rubs, or gallops, radial pulses 2+ bilaterally. Pulmonary/Chest: Effort normal. No respiratory distress. CTAB. No stridor. No wheezes. No rales. Abdominal: Soft. Nontender to palpation. No distension. No guarding, rebound tenderness, or evidence of ascites. : No CVA tenderness. Musculoskeletal: Moves all extremities. No gross deformity. Pelvis stable and nontender. No tenderness palpation of the cervical, thoracic, lumbar spine or paraspinal muscles. There is mild tenderness palpation of the right forearm as well as the right thigh and lower leg. No hip, knee, ankle or foot tenderness. Remainder of right upper extremity in the upper and lower left extremities nontender. NEUROLOGICAL: Awake and alert. GCS 15. Cranial nerves 2-12 grossly intact. Strength 5/5 throughout. Light touch sensation intact throughout. Finger to nose WNL. Gait normal.      Skin: Warm and dry. No rash. Psychiatric: Normal mood and affect. Behavior is normal.      Radiographs (if obtained):  [] The following radiograph was interpreted by myself in the absence of a radiologist:   [x] Radiologist's Report Reviewed:  CT CHEST WO CONTRAST   Final Result   1. No acute process in the chest.   2. Severe emphysematous changes. 3. Mild cardiomegaly. Coronary artery atherosclerotic vascular   calcifications. CT HEAD WO CONTRAST   Final Result   No acute findings in the head. CT CERVICAL SPINE WO CONTRAST   Final Result   1. No acute findings in the cervical spine. 2. Moderate cervical spine degenerative changes. 3. Bony demineralization. XR TIBIA FIBULA RIGHT (2 VIEWS)   Final Result   Study is limited by osteopenia. No acute osseous abnormality of the pelvis, right femur, right tibia and   fibula, or right forearm, given limitations of the exam.         XR FEMUR RIGHT (MIN 2 VIEWS)   Final Result   Study is limited by osteopenia.       No acute osseous abnormality of the pelvis, right femur, right tibia and   fibula, or right forearm, given limitations of the exam.         XR RADIUS ULNA RIGHT (2 VIEWS) Final Result   Study is limited by osteopenia. No acute osseous abnormality of the pelvis, right femur, right tibia and   fibula, or right forearm, given limitations of the exam.         XR PELVIS (1-2 VIEWS)   Final Result   Study is limited by osteopenia. No acute osseous abnormality of the pelvis, right femur, right tibia and   fibula, or right forearm, given limitations of the exam.         XR CHEST PORTABLE   Final Result   1. Airspace opacity in the mid to basilar left lung could be due to contusion   in the setting of trauma. However, asymmetric edema should be considered   given pulmonary vascular congestion and mild cardiomegaly. 2. Bony demineralization limits evaluation for fractures. Labs  No results found for this visit on 04/04/21. MDM  Patient presents for fall out of bed, injury to the right arm and leg. Imaging of all areas of pain as well as head and neck shows questionable left airspace opacity. CT chest performed which shows no evidence of opacity, severe emphysema. Discussed all results with patient and family. They report patient has been noncompliant with her medication for some time, doubt that she is taking Xarelto. They report the patient has been having frequent falls, is showing signs of increasing paranoia and dementia, they are planning to move patient to higher level of care at [facility] home, she has been judged to be not safe for independent living anymore. Family is requesting patient be admitted for 1 day because her room in higher level of care will be ready until tomorrow. This patient is ambulatory here. Eating and drinking normally. Has no injuries from fall. Discussed she is not an appropriate admission especially given the Covid pandemic and dangers of being in the hospital, family states they will stay with her tonight until she can be transferred to higher level of care.   Assessment and plan discussed with patient and family understands and agrees. Recommended returning to ED for any new or worsening symptoms. I have independently evaluated this patient. Final Impression  1. Fall from bed, initial encounter    2. Right arm pain    3. Right leg pain        Blood pressure (!) 155/69, pulse 77, temperature 98.3 °F (36.8 °C), temperature source Oral, resp. rate 15, height 5' 5\" (1.651 m), weight 100 lb (45.4 kg), SpO2 96 %. Disposition:  Discharge to home in stable condition. Patient was given scripts for the following medications. I counseled patient how to take these medications. Discharge Medication List as of 4/4/2021  3:08 PM          This chart was generated using the [address] [place] [address] dictation system. I created this record but it may contain dictation errors given the limitations of this technology.        Tarsha Krishnamurthy PA-C  04/04/21 [id number]

## 2021-05-23 ENCOUNTER — APPOINTMENT (OUTPATIENT)
Dept: CT IMAGING | Age: 86
End: 2021-05-23
Payer: MEDICARE

## 2021-05-23 ENCOUNTER — APPOINTMENT (OUTPATIENT)
Dept: GENERAL RADIOLOGY | Age: 86
End: 2021-05-23
Payer: MEDICARE

## 2021-05-23 ENCOUNTER — HOSPITAL ENCOUNTER (EMERGENCY)
Age: 86
Discharge: HOME | End: 2021-05-23
Attending: EMERGENCY MEDICINE
Payer: MEDICARE

## 2021-05-23 VITALS
SYSTOLIC BLOOD PRESSURE: 140 MMHG | TEMPERATURE: 97.9 F | HEART RATE: 76 BPM | DIASTOLIC BLOOD PRESSURE: 85 MMHG | OXYGEN SATURATION: 100 % | RESPIRATION RATE: 15 BRPM

## 2021-05-23 DIAGNOSIS — F02.80 ALZHEIMER'S DEMENTIA WITHOUT BEHAVIORAL DISTURBANCE, UNSPECIFIED TIMING OF DEMENTIA ONSET: ICD-10-CM

## 2021-05-23 DIAGNOSIS — G30.9 ALZHEIMER'S DEMENTIA WITHOUT BEHAVIORAL DISTURBANCE, UNSPECIFIED TIMING OF DEMENTIA ONSET: ICD-10-CM
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
## 2021-05-23 PROCEDURE — 71045 X-RAY EXAM CHEST 1 VIEW: CPT

## 2021-05-23 PROCEDURE — 99284 EMERGENCY DEPT VISIT MOD MDM: CPT

## 2021-05-23 PROCEDURE — 72125 CT NECK SPINE W/O DYE: CPT

## 2021-05-23 PROCEDURE — 70450 CT HEAD/BRAIN W/O DYE: CPT

## 2021-05-23 PROCEDURE — 72170 X-RAY EXAM OF PELVIS: CPT

## 2021-05-23 ASSESSMENT — PAIN SCALES - GENERAL: PAINLEVEL_OUTOF10: 7

## 2021-05-23 NOTE — ED NOTES
Pt clothing and earrings found . Message left with pt emergency contact Julia Vee at [phone]. This RN left her a message that belongings will be given to security and to call the charge phone at [phone] for info. Charge RN [facility] notified.   Belongings given to  Shin Solano RN  05/23/21 Alan Landeros RN  05/23/21 Alan Landeros RN  05/23/21 2048

## 2021-05-23 NOTE — ED PROVIDER NOTES
[facility]      TRIAGE CHIEF COMPLAINT:   No chief complaint on file. [place]:  Ramon Pulido is a 80 y.o. female that presents from nursing home with complaint of fall out of bed. Patient has a history of Alzheimer's on arrival patient is alert she is resting bed comfortably in a c-collar she is pleasantly confused apparently at baseline per nursing home. Fall out of bed no known injuries no other questions or concerns patient is moving all extremities again pleasantly confused she is unclear as the year and the president and thinks she lives in [facility]. No other questions or concerns.     REVIEW OF SYSTEMS:      Review of Systems   Unable to perform ROS: Dementia   Constitutional:        Fall out of bed       Past Medical History:   Diagnosis Date    Aortic valvular disease     Asthma     B12 deficiency     COPD (chronic obstructive pulmonary disease) ([address]     Dementia (HCC)     DVT (deep vein thrombosis) in pregnancy     H/O Doppler ABD [place] ultrasound 06/05/2019    NO evidence of AAA within the visualized portions of the abdominal aorta, possible right iliac artery stenosis    H/O Doppler lower venous ultrasound 06/05/2019    NO DVT OR SVT, No significant reflux in right, Significant reflux in Left CFV    H/O echocardiogram 06/05/2019    EF 45%, Moderate basal anteroseptal wall asymmetrical left ventricular hypertrophy, Left atrium mild to mod dilated, Mild to Mod AS, Mitral stenosis is severe, Mod AR and Mod Pulmonic regurg, Mild MR, TR, Mod Pulm HTN    Hyperlipidemia     Hypertension     Hypothyroidism     Iron deficiency     Osteoarthritis     Peripheral neuropathy     on EMG    Pulmonary nodule     thought benign    Seasonal allergies     Spinal stenosis     Type 2 diabetes mellitus without complication ([address]      Past Surgical History:   Procedure Laterality Date    APPENDECTOMY     Ив[address] 32    had teeth pulled    EYE SURGERY      KNEE SURGERY Right 1990    LYMPHADENECTOMY  1953    lymph gland removed from neck    NOSE SURGERY      broken nose    ROTATOR CUFF REPAIR Left 2005    surgery    KAREN AND BSO  1971    THORACIC Иван Karel [address]yanne 84    removed 3 ribs    TONSILLECTOMY AND ADENOIDECTOMY       Family History   Problem Relation Age of Onset    High Blood Pressure Mother     Heart Disease Mother     Stroke Mother     Osteoporosis Mother     High Blood Pressure Father     Heart Disease Father     Stroke Father     Cancer Sister     Heart Disease Brother     Cancer Brother      Social History     Socioeconomic History    Marital status:      Spouse name: Not on file    Number of children: Not on file    Years of education: Not on file    Highest education level: Not on file   Occupational History    Not on file   Tobacco Use    Smoking status: Former Smoker    Smokeless tobacco: Never Used   Vaping Use    Vaping Use: Never used   Substance and Sexual Activity    Alcohol use: No    Drug use: No    Sexual activity: Not on file   Other Topics Concern    Not on file   Social History Narrative    Not on file     Social Determinants of Health     Financial Resource Strain:     Difficulty of Paying Living Expenses:    Food Insecurity:     Worried About Running Out of Food in the Last Year:     [address] in the Last Year:    Transportation Needs:     Lack of Transportation (Medical):      Lack of Transportation (Non-Medical):    Physical Activity:     Days of Exercise per Week:     Minutes of Exercise per Session:    Stress:     Feeling of Stress :    Social Connections:     Frequency of Communication with Friends and Family:     Frequency of Social Gatherings with Friends and Family:     Attends [employer] Services:     Active Member of Clubs or Organizations:     Attends Club or Organization Meetings:     Marital Status:    Intimate Partner Violence:     Fear of Current or Extraocular movements intact. Conjunctiva/sclera: Conjunctivae normal.   Neck:      Vascular: No JVD. Trachea: Phonation normal.   Cardiovascular:      Rate and Rhythm: Normal rate and regular rhythm. Pulses: Normal pulses. Heart sounds: Normal heart sounds. No murmur heard. No friction rub. No gallop. Pulmonary:      Effort: Pulmonary effort is normal. No respiratory distress. Breath sounds: Normal breath sounds. No stridor. No wheezing, rhonchi or rales. Abdominal:      General: Bowel sounds are normal. There is no distension. There are no signs of injury. Palpations: Abdomen is soft. There is no mass or pulsatile mass. Tenderness: There is no abdominal tenderness. There is no guarding or rebound. Negative signs include Solis's sign, Rovsing's sign and McBurney's sign. Hernia: No hernia is present. Musculoskeletal:         General: No swelling, tenderness, deformity or signs of injury. Normal range of motion. Right shoulder: Normal.      Left shoulder: Normal.      Right upper arm: Normal.      Left upper arm: Normal.      Right elbow: Normal.      Left elbow: Normal.      Right forearm: Normal.      Left forearm: Normal.      Right wrist: Normal.      Left wrist: Normal.      Cervical back: Normal, full passive range of motion without pain and normal range of motion. No edema, erythema, signs of trauma, rigidity, torticollis, tenderness or crepitus. No pain with movement, spinous process tenderness or muscular tenderness. Normal range of motion. Thoracic back: Normal.      Lumbar back: Normal.      Right hip: Normal.      Left hip: Normal.      Right knee: Normal.      Left knee: Normal.      Right lower leg: Normal. No edema. Left lower leg: Normal. No edema. Right ankle: Normal.      Left ankle: Normal.   Skin:     General: Skin is warm. Coloration: Skin is not jaundiced or pale. Findings: No bruising, erythema, lesion or rash. Neurological:      General: No focal deficit present. Mental Status: She is alert. Mental status is at baseline. She is disoriented and confused. GCS: GCS eye subscore is 4. GCS verbal subscore is 4. GCS motor subscore is 6. Cranial Nerves: Cranial nerves are intact. No cranial nerve deficit, dysarthria or facial asymmetry. Sensory: Sensation is intact. No sensory deficit. Motor: Motor function is intact. No weakness, tremor, atrophy, abnormal muscle tone or seizure activity. Coordination: Coordination normal.   Psychiatric:         Mood and Affect: Mood normal.         Behavior: Behavior normal. Behavior is cooperative. I have reviewed andinterpreted all of the currently available lab results from this visit (if applicable):    No results found for this visit on 05/23/21. Radiographs (if obtained):  [] The following radiograph was interpreted by myself in the absence of a radiologist:  [x] Radiologist's Report Reviewed:    Chest x-ray pelvis x-ray CT brain, C-spine    EKG (if obtained): (All EKG's are interpreted by myself in the absence of a cardiologist)    MDM:    Patient here with fall to bed. Again from nursing home apparently fell to bed. She is pleasantly confused history of Alzheimer's. On arrival she is pleasantly confused she thinks she lives in [place] does not know the year etc.  She otherwise appears well she is in a c-collar reportedly again had a fall to bed. Imaging performed of head neck chest and pelvis all of which are negative for acute normality. Patient ambulated to the restroom had difficulty she is moving all extremities I do not see any signs of trauma. She otherwise is very pleasant she denies complaints at this time imaging negative will discharge home back to nursing home otherwise stable c-collar removed. Otherwise stable transfer. At this time I do not think she needs additional labs or imaging.   Likely mechanical fall    CLINICAL IMPRESSION:  Final diagnoses:   Fall from bed, initial encounter   Alzheimer's dementia without behavioral disturbance, unspecified timing of dementia onset ([facility] [address]       (Please note that portions of this note may have been completed with a voice recognition program. Efforts were made to edit the dictations but occasionally words aremis-transcribed.)    DISPOSITION REFERRAL (if applicable):  [facility] Emergency Department  [facility] [id number]  [phone]    If symptoms worsen    [facility] [address] - ADULT  [address]  [address]  (if applicable):  New Prescriptions    No medications on file          Lima Albrecht, [address] [facility], DO  05/23/21 9491

## 2021-05-23 NOTE — ED NOTES
Bed: ED-30  Expected date:   Expected time:   Means of arrival:   Comments:  Florian Wood, BETTE  05/23/21 [id number]

## 2021-05-29 ENCOUNTER — HOSPITAL ENCOUNTER (OUTPATIENT)
Age: 86
Setting detail: SPECIMEN
Discharge: HOME | End: 2021-05-29
Payer: MEDICARE

## 2021-05-29 PROCEDURE — 81001 URINALYSIS AUTO W/SCOPE: CPT

## 2021-05-29 PROCEDURE — 87086 URINE CULTURE/COLONY COUNT: CPT

## 2021-05-30 ENCOUNTER — HOSPITAL ENCOUNTER (OUTPATIENT)
Age: 86
Setting detail: SPECIMEN
Discharge: HOME | End: 2021-05-30
Payer: MEDICARE

## 2021-05-30 LAB
ALBUMIN SERPL-MCNC: 4.4 GM/DL (ref 3.4–5)
ALP BLD-CCNC: 81 IU/L (ref 40–129)
ALT SERPL-CCNC: 10 U/L (ref 10–40)
ANION GAP SERPL CALCULATED.3IONS-SCNC: 8 MMOL/L (ref 4–16)
AST SERPL-CCNC: 15 IU/L (ref 15–37)
BACTERIA: NEGATIVE /HPF
BILIRUB SERPL-MCNC: 0.4 MG/DL (ref 0–1)
BILIRUBIN URINE: NEGATIVE MG/DL
BLOOD, URINE: NEGATIVE
BUN BLDV-MCNC: 21 MG/DL (ref 6–23)
CALCIUM OXALATE CRYSTALS: ABNORMAL /HPF
CALCIUM SERPL-MCNC: 9.1 MG/DL (ref 8.3–10.6)
CHLORIDE BLD-SCNC: 102 MMOL/L (ref 99–110)
CLARITY: CLEAR
CO2: 29 MMOL/L (ref 21–32)
COLOR: YELLOW
CREAT SERPL-MCNC: 0.7 MG/DL (ref 0.6–1.1)
GFR AFRICAN AMERICAN: >60 ML/MIN/1.73M2
GFR NON-AFRICAN AMERICAN: >60 ML/MIN/1.73M2
GLUCOSE BLD-MCNC: 136 MG/DL (ref 70–99)
GLUCOSE, URINE: NEGATIVE MG/DL
HCT VFR BLD CALC: 38.1 % (ref 37–47)
HEMOGLOBIN: 11.5 GM/DL (ref 12.5–16)
KETONES, URINE: NEGATIVE MG/DL
LEUKOCYTE ESTERASE, URINE: NEGATIVE
MCH RBC QN AUTO: 27 PG (ref 27–31)
MCHC RBC AUTO-ENTMCNC: 30.2 % (ref 32–36)
MCV RBC AUTO: 89.4 FL (ref 78–100)
NITRITE URINE, QUANTITATIVE: NEGATIVE
PDW BLD-RTO: 14.8 % (ref 11.7–14.9)
PH, URINE: 6 (ref 5–8)
PLATELET # BLD: 164 K/CU MM (ref 140–440)
PMV BLD AUTO: 11.9 FL (ref 7.5–11.1)
POTASSIUM SERPL-SCNC: 4.1 MMOL/L (ref 3.5–5.1)
PROTEIN UA: 30 MG/DL
RBC # BLD: 4.26 M/CU MM (ref 4.2–5.4)
RBC URINE: <1 /HPF (ref 0–6)
SODIUM BLD-SCNC: 139 MMOL/L (ref 135–145)
SPECIFIC GRAVITY UA: 1.02 (ref 1–1.03)
SQUAMOUS EPITHELIAL: <1 /HPF
TOTAL PROTEIN: 6.4 GM/DL (ref 6.4–8.2)
TRICHOMONAS: ABNORMAL /HPF
UROBILINOGEN, URINE: NEGATIVE MG/DL (ref 0.2–1)
WBC # BLD: 5.6 K/CU MM (ref 4–10.5)
WBC UA: 1 /HPF (ref 0–5)

## 2021-05-30 PROCEDURE — 85027 COMPLETE CBC AUTOMATED: CPT

## 2021-05-30 PROCEDURE — 80053 COMPREHEN METABOLIC PANEL: CPT

## 2021-05-30 PROCEDURE — 36415 COLL VENOUS BLD VENIPUNCTURE: CPT

## 2021-05-31 LAB
CULTURE: NORMAL
Lab: NORMAL
SPECIMEN: NORMAL

## 2021-06-13 ENCOUNTER — HOSPITAL ENCOUNTER (OUTPATIENT)
Age: 86
Setting detail: SPECIMEN
Discharge: HOME | End: 2021-06-13
Payer: MEDICARE

## 2021-06-13 PROCEDURE — 87086 URINE CULTURE/COLONY COUNT: CPT

## 2021-06-13 PROCEDURE — 81001 URINALYSIS AUTO W/SCOPE: CPT

## 2021-06-14 LAB
BACTERIA: NEGATIVE /HPF
BILIRUBIN URINE: NEGATIVE MG/DL
BLOOD, URINE: NEGATIVE
CLARITY: CLEAR
COLOR: ABNORMAL
CULTURE: NORMAL
GLUCOSE, URINE: NEGATIVE MG/DL
KETONES, URINE: NEGATIVE MG/DL
LEUKOCYTE ESTERASE, URINE: NEGATIVE
Lab: NORMAL
NITRITE URINE, QUANTITATIVE: NEGATIVE
NON SQUAM EPI CELLS: <1 /HPF
PH, URINE: 7 (ref 5–8)
PROTEIN UA: NEGATIVE MG/DL
RBC URINE: 1 /HPF (ref 0–6)
SPECIFIC GRAVITY UA: 1.01 (ref 1–1.03)
SPECIMEN: NORMAL
TRICHOMONAS: ABNORMAL /HPF
UROBILINOGEN, URINE: NEGATIVE MG/DL (ref 0.2–1)
WBC UA: 1 /HPF (ref 0–5)

## 2021-06-15 ENCOUNTER — HOSPITAL ENCOUNTER (OUTPATIENT)
Age: 86
Setting detail: SPECIMEN
Discharge: HOME | End: 2021-06-15
Payer: MEDICARE

## 2021-06-15 LAB
ALBUMIN SERPL-MCNC: 3.6 GM/DL (ref 3.4–5)
ALP BLD-CCNC: 69 IU/L (ref 40–128)
ALT SERPL-CCNC: 14 U/L (ref 10–40)
ANION GAP SERPL CALCULATED.3IONS-SCNC: 13 MMOL/L (ref 4–16)
AST SERPL-CCNC: 16 IU/L (ref 15–37)
BILIRUB SERPL-MCNC: 0.4 MG/DL (ref 0–1)
BUN BLDV-MCNC: 19 MG/DL (ref 6–23)
CALCIUM SERPL-MCNC: 9.3 MG/DL (ref 8.3–10.6)
CHLORIDE BLD-SCNC: 99 MMOL/L (ref 99–110)
CO2: 32 MMOL/L (ref 21–32)
CREAT SERPL-MCNC: 1 MG/DL (ref 0.6–1.1)
GFR AFRICAN AMERICAN: >60 ML/MIN/1.73M2
GFR NON-AFRICAN AMERICAN: 52 ML/MIN/1.73M2
GLUCOSE BLD-MCNC: 74 MG/DL (ref 70–99)
HCT VFR BLD CALC: 43.9 % (ref 37–47)
HEMOGLOBIN: 13.4 GM/DL (ref 12.5–16)
MCH RBC QN AUTO: 29.6 PG (ref 27–31)
MCHC RBC AUTO-ENTMCNC: 30.5 % (ref 32–36)
MCV RBC AUTO: 96.9 FL (ref 78–100)
PDW BLD-RTO: 16.1 % (ref 11.7–14.9)
PLATELET # BLD: 194 K/CU MM (ref 140–440)
PMV BLD AUTO: 10.9 FL (ref 7.5–11.1)
POTASSIUM SERPL-SCNC: 4.3 MMOL/L (ref 3.5–5.1)
RBC # BLD: 4.53 M/CU MM (ref 4.2–5.4)
SODIUM BLD-SCNC: 144 MMOL/L (ref 135–145)
TOTAL PROTEIN: 6.1 GM/DL (ref 6.4–8.2)
WBC # BLD: 7.9 K/CU MM (ref 4–10.5)

## 2021-06-15 PROCEDURE — 85027 COMPLETE CBC AUTOMATED: CPT

## 2021-06-15 PROCEDURE — 80053 COMPREHEN METABOLIC PANEL: CPT

## 2021-06-15 PROCEDURE — 36415 COLL VENOUS BLD VENIPUNCTURE: CPT

## 2021-06-21 ENCOUNTER — TELEPHONE (OUTPATIENT)
Dept: FAMILY MEDICINE CLINIC | Age: 86
End: 2021-06-21

## 2021-06-24 ENCOUNTER — TELEPHONE (OUTPATIENT)
Dept: FAMILY MEDICINE CLINIC | Age: 86
End: 2021-06-24

## 2021-06-24 NOTE — TELEPHONE ENCOUNTER
We are in receipt of multiple home care orders including those from hospice care. Patient was last seen in February and as far as I am aware transferred care out as they are no longer getting prescriptions from me and have not seen me. I declined to sign these home care orders and we will call the 2 providers who are requesting them to ask them to find the patient's new PCP.

## 2021-07-01 ENCOUNTER — HOSPITAL ENCOUNTER (OUTPATIENT)
Age: 86
Setting detail: SPECIMEN
Discharge: HOME | End: 2021-07-01
Payer: MEDICARE

## 2021-07-01 LAB
ALBUMIN SERPL-MCNC: 4.2 GM/DL (ref 3.4–5)
ALP BLD-CCNC: 100 IU/L (ref 40–128)
ALT SERPL-CCNC: 10 U/L (ref 10–40)
ANION GAP SERPL CALCULATED.3IONS-SCNC: 17 MMOL/L (ref 4–16)
AST SERPL-CCNC: 17 IU/L (ref 15–37)
BILIRUB SERPL-MCNC: 0.4 MG/DL (ref 0–1)
BUN BLDV-MCNC: 22 MG/DL (ref 6–23)
CALCIUM SERPL-MCNC: 9.5 MG/DL (ref 8.3–10.6)
CHLORIDE BLD-SCNC: 101 MMOL/L (ref 99–110)
CO2: 24 MMOL/L (ref 21–32)
CREAT SERPL-MCNC: 0.8 MG/DL (ref 0.6–1.1)
GFR AFRICAN AMERICAN: >60 ML/MIN/1.73M2
GFR NON-AFRICAN AMERICAN: >60 ML/MIN/1.73M2
GLUCOSE BLD-MCNC: 73 MG/DL (ref 70–99)
HCT VFR BLD CALC: 40.5 % (ref 37–47)
HEMOGLOBIN: 12.2 GM/DL (ref 12.5–16)
MCH RBC QN AUTO: 27.3 PG (ref 27–31)
MCHC RBC AUTO-ENTMCNC: 30.1 % (ref 32–36)
MCV RBC AUTO: 90.6 FL (ref 78–100)
PDW BLD-RTO: 14.6 % (ref 11.7–14.9)
PLATELET # BLD: 179 K/CU MM (ref 140–440)
PMV BLD AUTO: 12.8 FL (ref 7.5–11.1)
POTASSIUM SERPL-SCNC: 5.4 MMOL/L (ref 3.5–5.1)
RBC # BLD: 4.47 M/CU MM (ref 4.2–5.4)
SODIUM BLD-SCNC: 142 MMOL/L (ref 135–145)
TOTAL PROTEIN: 7.3 GM/DL (ref 6.4–8.2)
WBC # BLD: 5.8 K/CU MM (ref 4–10.5)

## 2021-07-01 PROCEDURE — 36415 COLL VENOUS BLD VENIPUNCTURE: CPT

## 2021-07-01 PROCEDURE — 80053 COMPREHEN METABOLIC PANEL: CPT

## 2021-07-01 PROCEDURE — 85027 COMPLETE CBC AUTOMATED: CPT

## 2021-07-06 ENCOUNTER — HOSPITAL ENCOUNTER (OUTPATIENT)
Age: 86
Setting detail: SPECIMEN
Discharge: HOME | End: 2021-07-06
Payer: MEDICARE

## 2021-07-06 LAB
ALBUMIN SERPL-MCNC: 4.6 GM/DL (ref 3.4–5)
ALP BLD-CCNC: 88 IU/L (ref 40–129)
ALT SERPL-CCNC: 10 U/L (ref 10–40)
ANION GAP SERPL CALCULATED.3IONS-SCNC: 15 MMOL/L (ref 4–16)
AST SERPL-CCNC: 18 IU/L (ref 15–37)
BILIRUB SERPL-MCNC: 0.4 MG/DL (ref 0–1)
BILIRUBIN DIRECT: 0.2 MG/DL (ref 0–0.3)
BILIRUBIN, INDIRECT: 0.2 MG/DL (ref 0–0.7)
BUN BLDV-MCNC: 27 MG/DL (ref 6–23)
CALCIUM SERPL-MCNC: 9.2 MG/DL (ref 8.3–10.6)
CHLORIDE BLD-SCNC: 96 MMOL/L (ref 99–110)
CO2: 27 MMOL/L (ref 21–32)
CREAT SERPL-MCNC: 0.9 MG/DL (ref 0.6–1.1)
GFR AFRICAN AMERICAN: >60 ML/MIN/1.73M2
GFR NON-AFRICAN AMERICAN: 59 ML/MIN/1.73M2
GLUCOSE BLD-MCNC: 55 MG/DL (ref 70–99)
HCT VFR BLD CALC: 39.9 % (ref 37–47)
HEMOGLOBIN: 11.7 GM/DL (ref 12.5–16)
MCH RBC QN AUTO: 26.5 PG (ref 27–31)
MCHC RBC AUTO-ENTMCNC: 29.3 % (ref 32–36)
MCV RBC AUTO: 90.3 FL (ref 78–100)
PDW BLD-RTO: 14.7 % (ref 11.7–14.9)
PLATELET # BLD: 164 K/CU MM (ref 140–440)
PMV BLD AUTO: 13.1 FL (ref 7.5–11.1)
POTASSIUM SERPL-SCNC: 4.1 MMOL/L (ref 3.5–5.1)
RBC # BLD: 4.42 M/CU MM (ref 4.2–5.4)
SODIUM BLD-SCNC: 138 MMOL/L (ref 135–145)
TOTAL PROTEIN: 6.8 GM/DL (ref 6.4–8.2)
TSH HIGH SENSITIVITY: 6.05 UIU/ML (ref 0.27–4.2)
WBC # BLD: 5.7 K/CU MM (ref 4–10.5)

## 2021-07-06 PROCEDURE — 84443 ASSAY THYROID STIM HORMONE: CPT

## 2021-07-06 PROCEDURE — 36415 COLL VENOUS BLD VENIPUNCTURE: CPT

## 2021-07-06 PROCEDURE — 80053 COMPREHEN METABOLIC PANEL: CPT

## 2021-07-06 PROCEDURE — 82248 BILIRUBIN DIRECT: CPT

## 2021-07-06 PROCEDURE — 85027 COMPLETE CBC AUTOMATED: CPT

## 2021-08-03 ENCOUNTER — HOSPITAL ENCOUNTER (OUTPATIENT)
Age: 86
Setting detail: SPECIMEN
Discharge: HOME | End: 2021-08-03
Payer: MEDICARE

## 2021-08-03 LAB
ANION GAP SERPL CALCULATED.3IONS-SCNC: 8 MMOL/L (ref 4–16)
BUN BLDV-MCNC: 31 MG/DL (ref 6–23)
CALCIUM SERPL-MCNC: 8.9 MG/DL (ref 8.3–10.6)
CHLORIDE BLD-SCNC: 101 MMOL/L (ref 99–110)
CO2: 31 MMOL/L (ref 21–32)
CREAT SERPL-MCNC: 0.9 MG/DL (ref 0.6–1.1)
GFR AFRICAN AMERICAN: >60 ML/MIN/1.73M2
GFR NON-AFRICAN AMERICAN: 59 ML/MIN/1.73M2
GLUCOSE BLD-MCNC: 71 MG/DL (ref 70–99)
HCT VFR BLD CALC: 33.9 % (ref 37–47)
HEMOGLOBIN: 10 GM/DL (ref 12.5–16)
MCH RBC QN AUTO: 26.2 PG (ref 27–31)
MCHC RBC AUTO-ENTMCNC: 29.5 % (ref 32–36)
MCV RBC AUTO: 89 FL (ref 78–100)
PDW BLD-RTO: 15.3 % (ref 11.7–14.9)
PLATELET # BLD: 182 K/CU MM (ref 140–440)
PMV BLD AUTO: 12.2 FL (ref 7.5–11.1)
POTASSIUM SERPL-SCNC: 4.1 MMOL/L (ref 3.5–5.1)
RBC # BLD: 3.81 M/CU MM (ref 4.2–5.4)
SODIUM BLD-SCNC: 140 MMOL/L (ref 135–145)
WBC # BLD: 4.6 K/CU MM (ref 4–10.5)

## 2021-08-03 PROCEDURE — 85027 COMPLETE CBC AUTOMATED: CPT

## 2021-08-03 PROCEDURE — 80048 BASIC METABOLIC PNL TOTAL CA: CPT

## 2021-08-03 PROCEDURE — 36415 COLL VENOUS BLD VENIPUNCTURE: CPT

## 2021-08-05 ENCOUNTER — HOSPITAL ENCOUNTER (OUTPATIENT)
Age: 86
Setting detail: SPECIMEN
Discharge: HOME | End: 2021-08-05
Payer: MEDICARE

## 2021-08-05 LAB
ANION GAP SERPL CALCULATED.3IONS-SCNC: 9 MMOL/L (ref 4–16)
BUN BLDV-MCNC: 26 MG/DL (ref 6–23)
CALCIUM SERPL-MCNC: 8.8 MG/DL (ref 8.3–10.6)
CHLORIDE BLD-SCNC: 103 MMOL/L (ref 99–110)
CO2: 32 MMOL/L (ref 21–32)
CREAT SERPL-MCNC: 0.8 MG/DL (ref 0.6–1.1)
GFR AFRICAN AMERICAN: >60 ML/MIN/1.73M2
GFR NON-AFRICAN AMERICAN: >60 ML/MIN/1.73M2
GLUCOSE BLD-MCNC: 67 MG/DL (ref 70–99)
HCT VFR BLD CALC: 36.4 % (ref 37–47)
HEMOGLOBIN: 10.8 GM/DL (ref 12.5–16)
MCH RBC QN AUTO: 27.1 PG (ref 27–31)
MCHC RBC AUTO-ENTMCNC: 29.7 % (ref 32–36)
MCV RBC AUTO: 91.2 FL (ref 78–100)
PDW BLD-RTO: 15.4 % (ref 11.7–14.9)
PLATELET # BLD: 179 K/CU MM (ref 140–440)
PMV BLD AUTO: 11.7 FL (ref 7.5–11.1)
POTASSIUM SERPL-SCNC: 4.3 MMOL/L (ref 3.5–5.1)
RBC # BLD: 3.99 M/CU MM (ref 4.2–5.4)
SODIUM BLD-SCNC: 144 MMOL/L (ref 135–145)
WBC # BLD: 4.9 K/CU MM (ref 4–10.5)

## 2021-08-05 PROCEDURE — 36415 COLL VENOUS BLD VENIPUNCTURE: CPT

## 2021-08-05 PROCEDURE — 80048 BASIC METABOLIC PNL TOTAL CA: CPT

## 2021-08-05 PROCEDURE — 85027 COMPLETE CBC AUTOMATED: CPT

## 2021-08-11 ENCOUNTER — HOSPITAL ENCOUNTER (OUTPATIENT)
Age: 86
Setting detail: SPECIMEN
Discharge: HOME | End: 2021-08-11
Payer: COMMERCIAL

## 2021-08-11 LAB
ANION GAP SERPL CALCULATED.3IONS-SCNC: 10 MMOL/L (ref 4–16)
BUN BLDV-MCNC: 25 MG/DL (ref 6–23)
CALCIUM SERPL-MCNC: 8.9 MG/DL (ref 8.3–10.6)
CHLORIDE BLD-SCNC: 102 MMOL/L (ref 99–110)
CO2: 32 MMOL/L (ref 21–32)
CREAT SERPL-MCNC: 0.8 MG/DL (ref 0.6–1.1)
GFR AFRICAN AMERICAN: >60 ML/MIN/1.73M2
GFR NON-AFRICAN AMERICAN: >60 ML/MIN/1.73M2
GLUCOSE BLD-MCNC: 97 MG/DL (ref 70–99)
HCT VFR BLD CALC: 36.9 % (ref 37–47)
HEMOGLOBIN: 10.9 GM/DL (ref 12.5–16)
MCH RBC QN AUTO: 26.6 PG (ref 27–31)
MCHC RBC AUTO-ENTMCNC: 29.5 % (ref 32–36)
MCV RBC AUTO: 90 FL (ref 78–100)
PDW BLD-RTO: 15.4 % (ref 11.7–14.9)
PLATELET # BLD: 167 K/CU MM (ref 140–440)
PMV BLD AUTO: 12.4 FL (ref 7.5–11.1)
POTASSIUM SERPL-SCNC: 4.1 MMOL/L (ref 3.5–5.1)
RBC # BLD: 4.1 M/CU MM (ref 4.2–5.4)
SODIUM BLD-SCNC: 144 MMOL/L (ref 135–145)
WBC # BLD: 5.2 K/CU MM (ref 4–10.5)

## 2021-08-11 PROCEDURE — 36415 COLL VENOUS BLD VENIPUNCTURE: CPT

## 2021-08-11 PROCEDURE — 85027 COMPLETE CBC AUTOMATED: CPT

## 2021-08-11 PROCEDURE — 80048 BASIC METABOLIC PNL TOTAL CA: CPT

## 2021-08-16 ENCOUNTER — HOSPITAL ENCOUNTER (OUTPATIENT)
Age: 86
Setting detail: SPECIMEN
Discharge: HOME | End: 2021-08-16
Payer: MEDICARE

## 2021-08-16 LAB
HCT VFR BLD CALC: 36.6 % (ref 37–47)
HEMOGLOBIN: 10.4 GM/DL (ref 12.5–16)
MCH RBC QN AUTO: 26.1 PG (ref 27–31)
MCHC RBC AUTO-ENTMCNC: 28.4 % (ref 32–36)
MCV RBC AUTO: 91.7 FL (ref 78–100)
PDW BLD-RTO: 15.5 % (ref 11.7–14.9)
PLATELET # BLD: 169 K/CU MM (ref 140–440)
PMV BLD AUTO: 12.6 FL (ref 7.5–11.1)
RBC # BLD: 3.99 M/CU MM (ref 4.2–5.4)
WBC # BLD: 5.6 K/CU MM (ref 4–10.5)

## 2021-08-16 PROCEDURE — 36415 COLL VENOUS BLD VENIPUNCTURE: CPT

## 2021-08-16 PROCEDURE — 85027 COMPLETE CBC AUTOMATED: CPT

## 2021-08-19 ENCOUNTER — HOSPITAL ENCOUNTER (OUTPATIENT)
Age: 86
Setting detail: SPECIMEN
Discharge: HOME | End: 2021-08-19
Payer: COMMERCIAL

## 2021-08-19 LAB
HCT VFR BLD CALC: 37.3 % (ref 37–47)
HEMOGLOBIN: 11.2 GM/DL (ref 12.5–16)
MCH RBC QN AUTO: 26.7 PG (ref 27–31)
MCHC RBC AUTO-ENTMCNC: 30 % (ref 32–36)
MCV RBC AUTO: 88.8 FL (ref 78–100)
PDW BLD-RTO: 15.7 % (ref 11.7–14.9)
PLATELET # BLD: 179 K/CU MM (ref 140–440)
PMV BLD AUTO: 12.5 FL (ref 7.5–11.1)
RBC # BLD: 4.2 M/CU MM (ref 4.2–5.4)
WBC # BLD: 5.3 K/CU MM (ref 4–10.5)

## 2021-08-19 PROCEDURE — 85027 COMPLETE CBC AUTOMATED: CPT

## 2021-08-19 PROCEDURE — 36415 COLL VENOUS BLD VENIPUNCTURE: CPT

## 2021-08-23 ENCOUNTER — HOSPITAL ENCOUNTER (OUTPATIENT)
Age: 86
Setting detail: SPECIMEN
Discharge: HOME | End: 2021-08-23
Payer: COMMERCIAL

## 2021-08-23 LAB — TSH HIGH SENSITIVITY: 6.16 UIU/ML (ref 0.27–4.2)

## 2021-08-23 PROCEDURE — 36415 COLL VENOUS BLD VENIPUNCTURE: CPT

## 2021-08-23 PROCEDURE — 84443 ASSAY THYROID STIM HORMONE: CPT

## 2021-09-13 ENCOUNTER — APPOINTMENT (OUTPATIENT)
Dept: CT IMAGING | Age: 86
DRG: 521 | End: 2021-09-13
Payer: MEDICARE

## 2021-09-13 ENCOUNTER — APPOINTMENT (OUTPATIENT)
Dept: GENERAL RADIOLOGY | Age: 86
DRG: 521 | End: 2021-09-13
Payer: MEDICARE

## 2021-09-13 ENCOUNTER — ANESTHESIA EVENT (OUTPATIENT)
Dept: OPERATING ROOM | Age: 86
DRG: 521 | End: 2021-09-13
Payer: MEDICARE

## 2021-09-13 ENCOUNTER — HOSPITAL ENCOUNTER (INPATIENT)
Age: 86
LOS: 3 days | Discharge: NURSING, REHAB OR LONG-TERM CARE | DRG: 521 | End: 2021-09-17
Attending: STUDENT IN AN ORGANIZED HEALTH CARE EDUCATION/TRAINING PROGRAM | Admitting: HOSPITALIST
Payer: MEDICARE

## 2021-09-13 DIAGNOSIS — S72.001A CLOSED FRACTURE OF NECK OF RIGHT FEMUR, INITIAL ENCOUNTER (HCC): Primary | ICD-10-CM

## 2021-09-13 DIAGNOSIS — S72.001A CLOSED RIGHT HIP FRACTURE, INITIAL ENCOUNTER (HCC): ICD-10-CM

## 2021-09-13 LAB
ABO/RH: NORMAL
ANION GAP SERPL CALCULATED.3IONS-SCNC: 14 MMOL/L (ref 4–16)
ANTIBODY SCREEN: NEGATIVE
BASOPHILS ABSOLUTE: 0.1 K/CU MM
BASOPHILS RELATIVE PERCENT: 0.4 % (ref 0–1)
BUN BLDV-MCNC: 22 MG/DL (ref 6–23)
CALCIUM SERPL-MCNC: 8.9 MG/DL (ref 8.3–10.6)
CHLORIDE BLD-SCNC: 98 MMOL/L (ref 99–110)
CO2: 25 MMOL/L (ref 21–32)
CREAT SERPL-MCNC: 0.7 MG/DL (ref 0.6–1.1)
DIFFERENTIAL TYPE: ABNORMAL
EOSINOPHILS ABSOLUTE: 0 K/CU MM
EOSINOPHILS RELATIVE PERCENT: 0.2 % (ref 0–3)
GFR AFRICAN AMERICAN: >60 ML/MIN/1.73M2
GFR NON-AFRICAN AMERICAN: >60 ML/MIN/1.73M2
GLUCOSE BLD-MCNC: 129 MG/DL (ref 70–99)
HCT VFR BLD CALC: 34.2 % (ref 37–47)
HEMOGLOBIN: 10.7 GM/DL (ref 12.5–16)
IMMATURE NEUTROPHIL %: 0.3 % (ref 0–0.43)
INR BLD: 1.36 INDEX
LYMPHOCYTES ABSOLUTE: 0.5 K/CU MM
LYMPHOCYTES RELATIVE PERCENT: 4 % (ref 24–44)
MCH RBC QN AUTO: 26.7 PG (ref 27–31)
MCHC RBC AUTO-ENTMCNC: 31.3 % (ref 32–36)
MCV RBC AUTO: 85.3 FL (ref 78–100)
MONOCYTES ABSOLUTE: 0.8 K/CU MM
MONOCYTES RELATIVE PERCENT: 6.1 % (ref 0–4)
NUCLEATED RBC %: 0 %
PDW BLD-RTO: 15.7 % (ref 11.7–14.9)
PLATELET # BLD: 170 K/CU MM (ref 140–440)
PMV BLD AUTO: 11.8 FL (ref 7.5–11.1)
POTASSIUM SERPL-SCNC: 3.8 MMOL/L (ref 3.5–5.1)
PROTHROMBIN TIME: 17.6 SECONDS (ref 11.7–14.5)
RBC # BLD: 4.01 M/CU MM (ref 4.2–5.4)
SARS-COV-2, NAAT: NOT DETECTED
SEGMENTED NEUTROPHILS ABSOLUTE COUNT: 11.2 K/CU MM
SEGMENTED NEUTROPHILS RELATIVE PERCENT: 89 % (ref 36–66)
SODIUM BLD-SCNC: 137 MMOL/L (ref 135–145)
SOURCE: NORMAL
TOTAL IMMATURE NEUTOROPHIL: 0.04 K/CU MM
TOTAL NUCLEATED RBC: 0 K/CU MM
WBC # BLD: 12.6 K/CU MM (ref 4–10.5)

## 2021-09-13 PROCEDURE — 76376 3D RENDER W/INTRP POSTPROCES: CPT

## 2021-09-13 PROCEDURE — 73552 X-RAY EXAM OF FEMUR 2/>: CPT

## 2021-09-13 PROCEDURE — G0378 HOSPITAL OBSERVATION PER HR: HCPCS | Performed by: HOSPITALIST

## 2021-09-13 PROCEDURE — 86901 BLOOD TYPING SEROLOGIC RH(D): CPT

## 2021-09-13 PROCEDURE — 96372 THER/PROPH/DIAG INJ SC/IM: CPT

## 2021-09-13 PROCEDURE — G0378 HOSPITAL OBSERVATION PER HR: HCPCS

## 2021-09-13 PROCEDURE — 6360000002 HC RX W HCPCS: Performed by: NURSE PRACTITIONER

## 2021-09-13 PROCEDURE — 87635 SARS-COV-2 COVID-19 AMP PRB: CPT

## 2021-09-13 PROCEDURE — 71250 CT THORAX DX C-: CPT

## 2021-09-13 PROCEDURE — 70450 CT HEAD/BRAIN W/O DYE: CPT

## 2021-09-13 PROCEDURE — 73564 X-RAY EXAM KNEE 4 OR MORE: CPT

## 2021-09-13 PROCEDURE — 72125 CT NECK SPINE W/O DYE: CPT

## 2021-09-13 PROCEDURE — 99221 1ST HOSP IP/OBS SF/LOW 40: CPT | Performed by: ORTHOPAEDIC SURGERY

## 2021-09-13 PROCEDURE — 85610 PROTHROMBIN TIME: CPT

## 2021-09-13 PROCEDURE — 2580000003 HC RX 258: Performed by: NURSE PRACTITIONER

## 2021-09-13 PROCEDURE — 99285 EMERGENCY DEPT VISIT HI MDM: CPT

## 2021-09-13 PROCEDURE — 86850 RBC ANTIBODY SCREEN: CPT

## 2021-09-13 PROCEDURE — 86900 BLOOD TYPING SEROLOGIC ABO: CPT

## 2021-09-13 PROCEDURE — 6370000000 HC RX 637 (ALT 250 FOR IP): Performed by: NURSE PRACTITIONER

## 2021-09-13 PROCEDURE — 74176 CT ABD & PELVIS W/O CONTRAST: CPT

## 2021-09-13 PROCEDURE — 72170 X-RAY EXAM OF PELVIS: CPT

## 2021-09-13 PROCEDURE — 80048 BASIC METABOLIC PNL TOTAL CA: CPT

## 2021-09-13 PROCEDURE — 85025 COMPLETE CBC W/AUTO DIFF WBC: CPT

## 2021-09-13 RX ORDER — LEVOTHYROXINE SODIUM 0.07 MG/1
75 TABLET ORAL DAILY
Status: DISCONTINUED | OUTPATIENT
Start: 2021-09-13 | End: 2021-09-17 | Stop reason: HOSPADM

## 2021-09-13 RX ORDER — LEVOTHYROXINE SODIUM 0.07 MG/1
75 TABLET ORAL DAILY
COMMUNITY

## 2021-09-13 RX ORDER — CHOLECALCIFEROL (VITAMIN D3) 125 MCG
5 CAPSULE ORAL NIGHTLY
Status: DISCONTINUED | OUTPATIENT
Start: 2021-09-13 | End: 2021-09-17 | Stop reason: HOSPADM

## 2021-09-13 RX ORDER — ACETAMINOPHEN 325 MG/1
650 TABLET ORAL EVERY 6 HOURS
Status: DISCONTINUED | OUTPATIENT
Start: 2021-09-13 | End: 2021-09-17 | Stop reason: HOSPADM

## 2021-09-13 RX ORDER — POLYETHYLENE GLYCOL 3350 17 G/17G
17 POWDER, FOR SOLUTION ORAL DAILY PRN
Status: DISCONTINUED | OUTPATIENT
Start: 2021-09-13 | End: 2021-09-17 | Stop reason: HOSPADM

## 2021-09-13 RX ORDER — ONDANSETRON 2 MG/ML
4 INJECTION INTRAMUSCULAR; INTRAVENOUS EVERY 6 HOURS PRN
Status: DISCONTINUED | OUTPATIENT
Start: 2021-09-13 | End: 2021-09-17 | Stop reason: HOSPADM

## 2021-09-13 RX ORDER — TRAMADOL HYDROCHLORIDE 50 MG/1
50 TABLET ORAL ONCE
Status: COMPLETED | OUTPATIENT
Start: 2021-09-13 | End: 2021-09-13

## 2021-09-13 RX ORDER — ACETAMINOPHEN 325 MG/1
650 TABLET ORAL EVERY 6 HOURS PRN
COMMUNITY

## 2021-09-13 RX ORDER — SODIUM CHLORIDE 0.9 % (FLUSH) 0.9 %
5-40 SYRINGE (ML) INJECTION EVERY 12 HOURS SCHEDULED
Status: DISCONTINUED | OUTPATIENT
Start: 2021-09-13 | End: 2021-09-17 | Stop reason: HOSPADM

## 2021-09-13 RX ORDER — ONDANSETRON 4 MG/1
4 TABLET, ORALLY DISINTEGRATING ORAL EVERY 8 HOURS PRN
Status: DISCONTINUED | OUTPATIENT
Start: 2021-09-13 | End: 2021-09-17 | Stop reason: HOSPADM

## 2021-09-13 RX ORDER — BUSPIRONE HYDROCHLORIDE 5 MG/1
5 TABLET ORAL 2 TIMES DAILY
Status: DISCONTINUED | OUTPATIENT
Start: 2021-09-13 | End: 2021-09-15

## 2021-09-13 RX ORDER — SODIUM CHLORIDE 9 MG/ML
25 INJECTION, SOLUTION INTRAVENOUS PRN
Status: DISCONTINUED | OUTPATIENT
Start: 2021-09-13 | End: 2021-09-17 | Stop reason: HOSPADM

## 2021-09-13 RX ORDER — BUSPIRONE HYDROCHLORIDE 5 MG/1
5 TABLET ORAL 2 TIMES DAILY
COMMUNITY

## 2021-09-13 RX ORDER — SODIUM CHLORIDE 0.9 % (FLUSH) 0.9 %
5-40 SYRINGE (ML) INJECTION PRN
Status: DISCONTINUED | OUTPATIENT
Start: 2021-09-13 | End: 2021-09-17 | Stop reason: HOSPADM

## 2021-09-13 RX ORDER — CHOLECALCIFEROL (VITAMIN D3) 125 MCG
5 CAPSULE ORAL NIGHTLY
COMMUNITY

## 2021-09-13 RX ORDER — TRAMADOL HYDROCHLORIDE 50 MG/1
50 TABLET ORAL EVERY 6 HOURS PRN
Status: DISCONTINUED | OUTPATIENT
Start: 2021-09-13 | End: 2021-09-17 | Stop reason: HOSPADM

## 2021-09-13 RX ORDER — TRAMADOL HYDROCHLORIDE 50 MG/1
75 TABLET ORAL EVERY 6 HOURS PRN
Status: DISCONTINUED | OUTPATIENT
Start: 2021-09-13 | End: 2021-09-17 | Stop reason: HOSPADM

## 2021-09-13 RX ORDER — PANTOPRAZOLE SODIUM 40 MG/1
40 TABLET, DELAYED RELEASE ORAL
Status: DISCONTINUED | OUTPATIENT
Start: 2021-09-14 | End: 2021-09-17 | Stop reason: HOSPADM

## 2021-09-13 RX ADMIN — TRAMADOL HYDROCHLORIDE 50 MG: 50 TABLET, FILM COATED ORAL at 16:09

## 2021-09-13 RX ADMIN — BUSPIRONE HYDROCHLORIDE 5 MG: 5 TABLET ORAL at 19:22

## 2021-09-13 RX ADMIN — Medication 5 MG: at 22:21

## 2021-09-13 RX ADMIN — Medication 5000 UNITS: at 19:23

## 2021-09-13 RX ADMIN — ACETAMINOPHEN 650 MG: 325 TABLET ORAL at 19:21

## 2021-09-13 RX ADMIN — TRAMADOL HYDROCHLORIDE 50 MG: 50 TABLET, FILM COATED ORAL at 22:21

## 2021-09-13 RX ADMIN — LEVOTHYROXINE SODIUM 75 MCG: 0.07 TABLET ORAL at 19:24

## 2021-09-13 RX ADMIN — TRAMADOL HYDROCHLORIDE 50 MG: 50 TABLET, FILM COATED ORAL at 13:49

## 2021-09-13 RX ADMIN — ACETAMINOPHEN 650 MG: 325 TABLET ORAL at 13:49

## 2021-09-13 RX ADMIN — SODIUM CHLORIDE, PRESERVATIVE FREE 10 ML: 5 INJECTION INTRAVENOUS at 22:20

## 2021-09-13 RX ADMIN — ENOXAPARIN SODIUM 40 MG: 40 INJECTION SUBCUTANEOUS at 19:20

## 2021-09-13 RX ADMIN — SERTRALINE HYDROCHLORIDE 50 MG: 50 TABLET ORAL at 19:21

## 2021-09-13 RX ADMIN — BISACODYL 5 MG: 5 TABLET, COATED ORAL at 19:21

## 2021-09-13 ASSESSMENT — ENCOUNTER SYMPTOMS
CHEST TIGHTNESS: 0
BACK PAIN: 0
COLOR CHANGE: 0

## 2021-09-13 ASSESSMENT — PAIN SCALES - GENERAL
PAINLEVEL_OUTOF10: 8
PAINLEVEL_OUTOF10: 7
PAINLEVEL_OUTOF10: 4
PAINLEVEL_OUTOF10: 9
PAINLEVEL_OUTOF10: 0
PAINLEVEL_OUTOF10: 9

## 2021-09-13 ASSESSMENT — COPD QUESTIONNAIRES: CAT_SEVERITY: MILD

## 2021-09-13 ASSESSMENT — PAIN DESCRIPTION - LOCATION: LOCATION: HIP

## 2021-09-13 ASSESSMENT — PAIN DESCRIPTION - PAIN TYPE: TYPE: ACUTE PAIN

## 2021-09-13 ASSESSMENT — PAIN DESCRIPTION - FREQUENCY: FREQUENCY: CONTINUOUS

## 2021-09-13 ASSESSMENT — PAIN DESCRIPTION - ORIENTATION: ORIENTATION: RIGHT

## 2021-09-13 ASSESSMENT — PAIN DESCRIPTION - ONSET: ONSET: ON-GOING

## 2021-09-13 ASSESSMENT — PAIN DESCRIPTION - DESCRIPTORS: DESCRIPTORS: ACHING

## 2021-09-13 NOTE — ED NOTES
Cori Masters is patient's POA and would like to speak with the Surgeon etc with updates. A good number to reach her is [phone] or [phone].       Jack Burns RN  09/13/21 8751

## 2021-09-13 NOTE — H&P
HISTORY AND PHYSICAL             Date: 9/13/2021        Patient Name: Bao Carter     YOB: 1931      Age:  80 y.o. Chief Complaint     Chief Complaint   Patient presents with    Hip Pain           History Obtained From   electronic medical record, reason patient could not give history:  Alzheimers    History of Present Illness   80-year-old female, with history of Alzheimer's disease residing in a dementia cloud, who presents with hip pain. Patient was found by staff sitting in a chair complaining of hip pain, unable to bear weight and staff noted a large bruise to the right hip. Patient does not recall falling, or any fracture but states she does have significant pain in the right leg, unable to scale. Denies pain elsewhere. Denies chest pain or shortness of breath, denies lightheadedness or dizziness, denies blood in stool or urine.   Additional review of symptoms negative or as noted below    Past Medical History     Past Medical History:   Diagnosis Date    Aortic valvular disease     Asthma     B12 deficiency     COPD (chronic obstructive pulmonary disease) ([facility] [address]     Dementia (HCC)     DVT (deep vein thrombosis) in pregnancy     H/O Doppler ABD [facility] ultrasound 06/05/2019    NO evidence of AAA within the visualized portions of the abdominal aorta, possible right iliac artery stenosis    H/O Doppler lower venous ultrasound 06/05/2019    NO DVT OR SVT, No significant reflux in right, Significant reflux in Left CFV    H/O echocardiogram 06/05/2019    EF 45%, Moderate basal anteroseptal wall asymmetrical left ventricular hypertrophy, Left atrium mild to mod dilated, Mild to Mod AS, Mitral stenosis is severe, Mod AR and Mod Pulmonic regurg, Mild MR, TR, Mod Pulm HTN    Hyperlipidemia     Hypertension     Hypothyroidism     Iron deficiency     Osteoarthritis     Peripheral neuropathy     on EMG    Pulmonary nodule     thought benign    Seasonal allergies     Spinal stenosis  Type 2 diabetes mellitus without complication [facility])         Past Surgical History     Past Surgical History:   Procedure Laterality Date    APPENDECTOMY      BREAST BIOPSY     [address]    had teeth pulled    EYE SURGERY      KNEE SURGERY Right 1990    LYMPHADENECTOMY  1953    lymph gland removed from neck    NOSE SURGERY      broken nose    ROTATOR CUFF REPAIR Left 2005    surgery    KAREN AND BSO  1971    THORACIC [address]    removed 3 ribs    TONSILLECTOMY AND ADENOIDECTOMY          Medications Prior to Admission     Prior to Admission medications    Medication Sig Start Date End Date Taking?  Authorizing Provider   FUROSEMIDE PO Take 10 mg by mouth daily   Yes Historical Provider, MD   melatonin 5 MG TABS tablet Take 5 mg by mouth nightly   Yes Historical Provider, MD   levothyroxine (SYNTHROID) 75 MCG tablet Take 75 mcg by mouth Daily   Yes Historical Provider, MD   Cholecalciferol (VITAMIN D3) 125 MCG (5000 UT) TABS Take 5,000 Units by mouth daily   Yes Historical Provider, MD   busPIRone (BUSPAR) 5 MG tablet Take 5 mg by mouth 2 times daily   Yes Historical Provider, MD   acetaminophen (TYLENOL) 325 MG tablet Take 650 mg by mouth every 6 hours as needed for Pain   Yes Historical Provider, MD   sertraline (ZOLOFT) 50 MG tablet Take 1 tablet by mouth daily 2/9/21  Yes Juan A Martinez MD   rivaroxaban (Сергей Sharps) 15 MG TABS tablet Take 1 tablet by mouth daily 2/9/21  Yes Juan A Martinez MD        Allergies   Iodine, Bee pollen, Tetanus toxoids, and Penicillins    Social History     Social History     Tobacco History     Smoking Status  Former Smoker    Smokeless Tobacco Use  Never Used          Alcohol History     Alcohol Use Status  No          Drug Use     Drug Use Status  No          Sexual Activity     Sexually Active  Not Asked                Family History     Family History   Problem Relation Age of Onset    High Blood Pressure Mother     Heart Disease Mother     Stroke Mother     Osteoporosis Mother     High Blood Pressure Father     Heart Disease Father     Stroke Father     Cancer Sister     Heart Disease Brother     Cancer Brother        Review of Systems   Review of Systems   Unable to perform ROS: Dementia   Musculoskeletal:        Endorses pain right hip       Physical Exam   /73   Pulse 95   Temp 98.4 °F (36.9 °C) (Oral)   Resp 24   Ht 5' 5\" (1.651 m)   Wt 100 lb (45.4 kg)   BMI 16.64 kg/m²     Physical Exam  Vitals and nursing note reviewed. Constitutional:       General: She is not in acute distress. Appearance: Normal appearance. HENT:      Head: Normocephalic. Cardiovascular:      Rate and Rhythm: Normal rate. Pulses: Normal pulses. Dorsalis pedis pulses are 2+ on the right side. Posterior tibial pulses are 2+ on the right side. Pulmonary:      Effort: Pulmonary effort is normal. No respiratory distress. Breath sounds: No wheezing or rhonchi. Abdominal:      General: Abdomen is flat. Bowel sounds are normal. There is no distension. Musculoskeletal:         General: Normal range of motion. Legs:       Comments: Right hip with large hematoma noted. See correlation on CT scan. Patient endorses pain with palpation. Intact sensation. Palpable pulses. Skin:     General: Skin is warm and dry. Capillary Refill: Capillary refill takes 2 to 3 seconds. Neurological:      General: No focal deficit present. Mental Status: She is alert and oriented to person, place, and time.          Labs      Recent Results (from the past 24 hour(s))   Basic Metabolic Panel w/ Reflex to MG    Collection Time: 09/13/21 10:00 AM   Result Value Ref Range    Sodium 137 135 - 145 MMOL/L    Potassium 3.8 3.5 - 5.1 MMOL/L    Chloride 98 (L) 99 - 110 mMol/L    CO2 25 21 - 32 MMOL/L    Anion Gap 14 4 - 16    BUN 22 6 - 23 MG/DL    CREATININE 0.7 0.6 - 1.1 MG/DL    Glucose 129 (H) 70 - 99 MG/DL    Calcium 8.9 8.3 - 10.6 MG/DL    GFR Non-African American >60 >60 mL/min/1.73m2    GFR African American >60 >60 mL/min/1.73m2   CBC Auto Differential    Collection Time: 09/13/21 10:00 AM   Result Value Ref Range    WBC 12.6 (H) 4.0 - 10.5 K/CU MM    RBC 4.01 (L) 4.2 - 5.4 M/CU MM    Hemoglobin 10.7 (L) 12.5 - 16.0 GM/DL    Hematocrit 34.2 (L) 37 - 47 %    MCV 85.3 78 - 100 FL    MCH 26.7 (L) 27 - 31 PG    MCHC 31.3 (L) 32.0 - 36.0 %    RDW 15.7 (H) 11.7 - 14.9 %    Platelets 440 894 - 191 K/CU MM    MPV 11.8 (H) 7.5 - 11.1 FL    Differential Type AUTOMATED DIFFERENTIAL     Segs Relative 89.0 (H) 36 - 66 %    Lymphocytes % 4.0 (L) 24 - 44 %    Monocytes % 6.1 (H) 0 - 4 %    Eosinophils % 0.2 0 - 3 %    Basophils % 0.4 0 - 1 %    Segs Absolute 11.2 K/CU MM    Lymphocytes Absolute 0.5 K/CU MM    Monocytes Absolute 0.8 K/CU MM    Eosinophils Absolute 0.0 K/CU MM    Basophils Absolute 0.1 K/CU MM    Nucleated RBC % 0.0 %    Total Nucleated RBC 0.0 K/CU MM    Total Immature Neutrophil 0.04 K/CU MM    Immature Neutrophil % 0.3 0 - 0.43 %   Protime-INR    Collection Time: 09/13/21 10:00 AM   Result Value Ref Range    Protime 17.6 (H) 11.7 - 14.5 SECONDS    INR 1.36 INDEX   TYPE AND SCREEN    Collection Time: 09/13/21 10:00 AM   Result Value Ref Range    ABO/Rh O POSITIVE     Antibody Screen NEGATIVE         Imaging/Diagnostics Last 24 Hours   CT ABDOMEN PELVIS WO CONTRAST Additional Contrast? None    Result Date: 9/13/2021  EXAMINATION: CT OF THE THORACIC SPINE WITHOUT CONTRAST; CT OF THE ABDOMEN AND PELVIS WITHOUT CONTRAST; CT OF THE CHEST WITHOUT CONTRAST; CT OF THE LUMBAR SPINE WITHOUT CONTRAST, 9/13/2021 10:06 am; 9/13/2021 10:17 am; 9/13/2021 10:05 am TECHNIQUE: CT of the thoracic spine was performed without the administration of intravenous contrast. Multiplanar reformatted images are provided for review.  Dose modulation, iterative reconstruction, and/or weight based adjustment of the mA/kV was utilized to reduce the radiation dose to as low as reasonably achievable.; CT of the abdomen and pelvis was performed without the administration of intravenous contrast. Multiplanar reformatted images are provided for review. Dose modulation, iterative reconstruction, and/or weight based adjustment of the mA/kV was utilized to reduce the radiation dose to as low as reasonably achievable.; CT of the chest was performed without the administration of intravenous contrast. Multiplanar reformatted images are provided for review. Dose modulation, iterative reconstruction, and/or weight based adjustment of the mA/kV was utilized to reduce the radiation dose to as low as reasonably achievable.; CT of the lumbar spine was performed without the administration of intravenous contrast. Multiplanar reformatted images are provided for review. Adjustment of mA and/or kV according to patient size was utilized. Dose modulation, iterative reconstruction, and/or weight based adjustment of the mA/kV was utilized to reduce the radiation dose to as low as reasonably achievable. COMPARISON: 04/04/2021 HISTORY: ORDERING SYSTEM PROVIDED HISTORY: trauma TECHNOLOGIST PROVIDED HISTORY: Reason for exam:->trauma Decision Support Exception - unselect if not a suspected or confirmed emergency medical condition->Emergency Medical Condition (MA) Reason for Exam: fall Acuity: Acute Type of Exam: Initial Mechanism of Injury: fall Relevant Medical/Surgical History: unknown; ORDERING SYSTEM PROVIDED HISTORY: trauma TECHNOLOGIST PROVIDED HISTORY: Reason for exam:->trauma Reason for Exam: fall Acuity: Acute Type of Exam: Initial Mechanism of Injury: fall Relevant Medical/Surgical History: unknown; ORDERING SYSTEM PROVIDED HISTORY: trauma TECHNOLOGIST PROVIDED HISTORY: Reason for exam:->trauma Additional Contrast?->None Reason for Exam: fall Acuity: Acute Type of Exam: Initial Mechanism of Injury: fall Relevant Medical/Surgical History: unknown FINDINGS: CT CHEST: No thoracic adenopathy.   Heart size is normal.  No pericardial effusion. Thoracic aorta is normal caliber. Severe emphysema. No acute lung or pleural injury. No suspicious pulmonary nodule. The central airways are clear. No acute rib fracture. CT ABDOMEN: The unenhanced liver, spleen, pancreas, gallbladder, adrenal glands, and kidneys demonstrate no acute abnormality. No acute bowel or mesenteric injury on the noncontrast CT. Sigmoid diverticulosis. No hemoperitoneum or pneumoperitoneum. No abdominal aortic aneurysm. No adenopathy. CT PELVIS: No pelvic hematoma or free fluid. Distended urinary bladder. An acute traumatic subcapital right femoral neck fracture is present. No additional fracture is present in the pelvis. In the soft tissues superficial to the right greater trochanter, there is a soft tissue hematoma measuring 6 x 6 x 3 cm with surrounding bruising and soft tissue edema. No intramuscular hematoma. THORACIC/LUMBAR SPINE: No acute fracture in the thoracic spine. No lytic or destructive lesion. No spondylolisthesis. Lumbar vertebral body heights are maintained. No acute fracture in the lumbar spine. No traumatic spondylolisthesis. Mild degenerative changes. 1. No acute abnormality in the chest. 2. No acute abnormality in the abdomen or pelvis. 3. Fractured subcapital right femoral neck. Adjacent 6 cm superficial soft tissue hematoma. 4. No acute abnormality in the thoracic spine. 5. No acute abnormality in the lumbar spine. XR PELVIS (1-2 VIEWS)    Result Date: 9/13/2021  EXAMINATION: ONE XRAY VIEW OF THE PELVIS 9/13/2021 8:27 am COMPARISON: Radiograph of the pelvis dated 05/23/2021.  HISTORY: ORDERING SYSTEM PROVIDED HISTORY: trauma TECHNOLOGIST PROVIDED HISTORY: Reason for exam:->trauma Reason for Exam: trauma Acuity: Acute Type of Exam: Initial Mechanism of Injury:  pt found by staff sitting in chair, c/o hip pain; pt unable to bare weight; pt with large bruise on rt hip but does not recall falling FINDINGS: There is mildly displaced fracture of the right femoral neck, which appears to involve the subcapital region. There is also additional equivocal cortical irregularity of the greater trochanter. No evidence of dislocation is seen. The bones appear demineralized. Evaluation of the sacrum is limited due to overlying bowel gas. The sacroiliac joints appear grossly symmetric. The pubic symphysis appears congruent. Mild osteoarthritis affects the pubic symphysis. Mildly displaced fracture of the right femoral neck involving the subcapital region with additional equivocal cortical irregularity of the greater trochanter. The bones appear demineralized. XR FEMUR RIGHT (MIN 2 VIEWS)    Result Date: 9/13/2021  EXAMINATION: FOUR XRAY VIEWS OF THE RIGHT KNEE;   XRAY VIEWS OF THE RIGHT FEMUR 9/13/2021 8:27 am COMPARISON: None. HISTORY: ORDERING SYSTEM PROVIDED HISTORY: trauma TECHNOLOGIST PROVIDED HISTORY: Reason for exam:->trauma Reason for Exam: trauma Acuity: Acute Type of Exam: Initial Mechanism of Injury:  pt found by staff sitting in chair, c/o hip pain; pt unable to bare weight; pt with large bruise on rt hip but does not recall falling FINDINGS: Evaluation is partially limited due to inability to optimally position the patient and diffuse bony demineralization. There is fracture of the right femoral neck. Please refer to the report of radiograph of the pelvis obtained on the same day for further evaluation. The shaft of right femur appears intact. Mild osteoarthritis affects the right knee without convincing radiographic evidence of acute fracture or dislocation of the right knee seen. No evidence of joint effusion is seen. Vascular calcification changes are seen. Right femoral neck fracture. Please refer to the report of the radiograph of the pelvis obtained on the same day for further evaluation. The shaft of the right femur and right knee appear intact. The bones appear demineralized.      XR KNEE RIGHT (MIN 4 VIEWS)    Result Date: 9/13/2021  EXAMINATION: FOUR XRAY VIEWS OF THE RIGHT KNEE;   XRAY VIEWS OF THE RIGHT FEMUR 9/13/2021 8:27 am COMPARISON: None. HISTORY: ORDERING SYSTEM PROVIDED HISTORY: trauma TECHNOLOGIST PROVIDED HISTORY: Reason for exam:->trauma Reason for Exam: trauma Acuity: Acute Type of Exam: Initial Mechanism of Injury:  pt found by staff sitting in chair, c/o hip pain; pt unable to bare weight; pt with large bruise on rt hip but does not recall falling FINDINGS: Evaluation is partially limited due to inability to optimally position the patient and diffuse bony demineralization. There is fracture of the right femoral neck. Please refer to the report of radiograph of the pelvis obtained on the same day for further evaluation. The shaft of right femur appears intact. Mild osteoarthritis affects the right knee without convincing radiographic evidence of acute fracture or dislocation of the right knee seen. No evidence of joint effusion is seen. Vascular calcification changes are seen. Right femoral neck fracture. Please refer to the report of the radiograph of the pelvis obtained on the same day for further evaluation. The shaft of the right femur and right knee appear intact. The bones appear demineralized. CT HEAD WO CONTRAST    Result Date: 9/13/2021  EXAMINATION: CT OF THE HEAD WITHOUT CONTRAST  9/13/2021 9:12 am TECHNIQUE: CT of the head was performed without the administration of intravenous contrast. Dose modulation, iterative reconstruction, and/or weight based adjustment of the mA/kV was utilized to reduce the radiation dose to as low as reasonably achievable. COMPARISON: 05/23/2021 HISTORY: ORDERING SYSTEM PROVIDED HISTORY: trauma TECHNOLOGIST PROVIDED HISTORY: Reason for exam:->trauma Has a \"code stroke\" or \"stroke alert\" been called? ->No Decision Support Exception - unselect if not a suspected or confirmed emergency medical condition->Emergency Medical Condition (MA) Reason for Exam: fall Acuity: Acute Type of Exam: Initial Mechanism of Injury: fall Relevant Medical/Surgical History: unknown FINDINGS: BRAIN/VENTRICLES: There is no acute intracranial hemorrhage, mass effect or midline shift. No abnormal extra-axial fluid collection. The gray-white differentiation is maintained without evidence of an acute infarct. There is no evidence of hydrocephalus. Patchy hypodensities in the periventricular and subcortical white matter, which are nonspecific, but may represent chronic small vessel ischemic change. ORBITS: The visualized portion of the orbits demonstrate no acute abnormality. SINUSES: The visualized paranasal sinuses and mastoid air cells demonstrate no acute abnormality. SOFT TISSUES/SKULL:  No acute abnormality of the visualized skull or soft tissues. No acute intracranial abnormality. CT CHEST WO CONTRAST    Result Date: 9/13/2021  EXAMINATION: CT OF THE THORACIC SPINE WITHOUT CONTRAST; CT OF THE ABDOMEN AND PELVIS WITHOUT CONTRAST; CT OF THE CHEST WITHOUT CONTRAST; CT OF THE LUMBAR SPINE WITHOUT CONTRAST, 9/13/2021 10:06 am; 9/13/2021 10:17 am; 9/13/2021 10:05 am TECHNIQUE: CT of the thoracic spine was performed without the administration of intravenous contrast. Multiplanar reformatted images are provided for review. Dose modulation, iterative reconstruction, and/or weight based adjustment of the mA/kV was utilized to reduce the radiation dose to as low as reasonably achievable.; CT of the abdomen and pelvis was performed without the administration of intravenous contrast. Multiplanar reformatted images are provided for review. Dose modulation, iterative reconstruction, and/or weight based adjustment of the mA/kV was utilized to reduce the radiation dose to as low as reasonably achievable.; CT of the chest was performed without the administration of intravenous contrast. Multiplanar reformatted images are provided for review.  Dose modulation, iterative reconstruction, and/or weight based adjustment of the mA/kV was utilized to reduce the radiation dose to as low as reasonably achievable.; CT of the lumbar spine was performed without the administration of intravenous contrast. Multiplanar reformatted images are provided for review. Adjustment of mA and/or kV according to patient size was utilized. Dose modulation, iterative reconstruction, and/or weight based adjustment of the mA/kV was utilized to reduce the radiation dose to as low as reasonably achievable. COMPARISON: 04/04/2021 HISTORY: ORDERING SYSTEM PROVIDED HISTORY: trauma TECHNOLOGIST PROVIDED HISTORY: Reason for exam:->trauma Decision Support Exception - unselect if not a suspected or confirmed emergency medical condition->Emergency Medical Condition (MA) Reason for Exam: fall Acuity: Acute Type of Exam: Initial Mechanism of Injury: fall Relevant Medical/Surgical History: unknown; ORDERING SYSTEM PROVIDED HISTORY: trauma TECHNOLOGIST PROVIDED HISTORY: Reason for exam:->trauma Reason for Exam: fall Acuity: Acute Type of Exam: Initial Mechanism of Injury: fall Relevant Medical/Surgical History: unknown; ORDERING SYSTEM PROVIDED HISTORY: trauma TECHNOLOGIST PROVIDED HISTORY: Reason for exam:->trauma Additional Contrast?->None Reason for Exam: fall Acuity: Acute Type of Exam: Initial Mechanism of Injury: fall Relevant Medical/Surgical History: unknown FINDINGS: CT CHEST: No thoracic adenopathy. Heart size is normal.  No pericardial effusion. Thoracic aorta is normal caliber. Severe emphysema. No acute lung or pleural injury. No suspicious pulmonary nodule. The central airways are clear. No acute rib fracture. CT ABDOMEN: The unenhanced liver, spleen, pancreas, gallbladder, adrenal glands, and kidneys demonstrate no acute abnormality. No acute bowel or mesenteric injury on the noncontrast CT. Sigmoid diverticulosis. No hemoperitoneum or pneumoperitoneum. No abdominal aortic aneurysm. No adenopathy. swelling. No acute abnormality of the cervical spine. CT LUMBAR RECONSTRUCTION WO POST PROCESS    Result Date: 9/13/2021  EXAMINATION: CT OF THE THORACIC SPINE WITHOUT CONTRAST; CT OF THE ABDOMEN AND PELVIS WITHOUT CONTRAST; CT OF THE CHEST WITHOUT CONTRAST; CT OF THE LUMBAR SPINE WITHOUT CONTRAST, 9/13/2021 10:06 am; 9/13/2021 10:17 am; 9/13/2021 10:05 am TECHNIQUE: CT of the thoracic spine was performed without the administration of intravenous contrast. Multiplanar reformatted images are provided for review. Dose modulation, iterative reconstruction, and/or weight based adjustment of the mA/kV was utilized to reduce the radiation dose to as low as reasonably achievable.; CT of the abdomen and pelvis was performed without the administration of intravenous contrast. Multiplanar reformatted images are provided for review. Dose modulation, iterative reconstruction, and/or weight based adjustment of the mA/kV was utilized to reduce the radiation dose to as low as reasonably achievable.; CT of the chest was performed without the administration of intravenous contrast. Multiplanar reformatted images are provided for review. Dose modulation, iterative reconstruction, and/or weight based adjustment of the mA/kV was utilized to reduce the radiation dose to as low as reasonably achievable.; CT of the lumbar spine was performed without the administration of intravenous contrast. Multiplanar reformatted images are provided for review. Adjustment of mA and/or kV according to patient size was utilized. Dose modulation, iterative reconstruction, and/or weight based adjustment of the mA/kV was utilized to reduce the radiation dose to as low as reasonably achievable.  COMPARISON: 04/04/2021 HISTORY: ORDERING SYSTEM PROVIDED HISTORY: trauma TECHNOLOGIST PROVIDED HISTORY: Reason for exam:->trauma Decision Support Exception - unselect if not a suspected or confirmed emergency medical condition->Emergency Medical Condition right femoral neck. Adjacent 6 cm superficial soft tissue hematoma. 4. No acute abnormality in the thoracic spine. 5. No acute abnormality in the lumbar spine. CT THORACIC RECONSTRUCTION WO POST PROCESS    Result Date: 9/13/2021  EXAMINATION: CT OF THE THORACIC SPINE WITHOUT CONTRAST; CT OF THE ABDOMEN AND PELVIS WITHOUT CONTRAST; CT OF THE CHEST WITHOUT CONTRAST; CT OF THE LUMBAR SPINE WITHOUT CONTRAST, 9/13/2021 10:06 am; 9/13/2021 10:17 am; 9/13/2021 10:05 am TECHNIQUE: CT of the thoracic spine was performed without the administration of intravenous contrast. Multiplanar reformatted images are provided for review. Dose modulation, iterative reconstruction, and/or weight based adjustment of the mA/kV was utilized to reduce the radiation dose to as low as reasonably achievable.; CT of the abdomen and pelvis was performed without the administration of intravenous contrast. Multiplanar reformatted images are provided for review. Dose modulation, iterative reconstruction, and/or weight based adjustment of the mA/kV was utilized to reduce the radiation dose to as low as reasonably achievable.; CT of the chest was performed without the administration of intravenous contrast. Multiplanar reformatted images are provided for review. Dose modulation, iterative reconstruction, and/or weight based adjustment of the mA/kV was utilized to reduce the radiation dose to as low as reasonably achievable.; CT of the lumbar spine was performed without the administration of intravenous contrast. Multiplanar reformatted images are provided for review. Adjustment of mA and/or kV according to patient size was utilized. Dose modulation, iterative reconstruction, and/or weight based adjustment of the mA/kV was utilized to reduce the radiation dose to as low as reasonably achievable.  COMPARISON: 04/04/2021 HISTORY: ORDERING SYSTEM PROVIDED HISTORY: trauma TECHNOLOGIST PROVIDED HISTORY: Reason for exam:->trauma Decision Support No acute abnormality in the chest. 2. No acute abnormality in the abdomen or pelvis. 3. Fractured subcapital right femoral neck. Adjacent 6 cm superficial soft tissue hematoma. 4. No acute abnormality in the thoracic spine. 5. No acute abnormality in the lumbar spine. Assessment      Hospital Problems         Last Modified POA    Closed right hip fracture, initial encounter ([facility] [address] 9/13/2021 Yes          Plan   #Closed right hip fracture   -Admit to observation, keep patient n.p.o. for possible surgery today.   -Dr. Ric Robbins with orthopedic surgery already on board. Contacting family, possible surgery. -CT scan shows fractured subcapital right femoral neck. Adjacent 60 cm superficial soft tissue hematoma. No additional abnormalities noted on CT scan   -Scheduled Tylenol and attempt to minimize narcotic use due to underlying dementia. Ultram as needed every 6 Hours for pain    #History of DVT   -Remote history noted,   -Patient on Xarelto prior to admission, will hold anticoagulants and not resume without clearance per CT surgery. CBC stable from prior records. Repeat CBC in a.m.   -Start Lovenox for DVT prophylaxis, hold for day of surgery    #Osteoarthritis  #Osteoporosis   -Per history, on vitamin D chronically as an outpatient   -Continue home vitamin D, check vitamin D in a.m.   -Last bone density scan completed in 2016 per  records, recommend repeat bone density as an outpatient. #Fall in long term care facility   #Dementia   -Placed on fall precautions, strict bedrest pending surgery   -May need PARAMJIT postop  #Type 2 diabetes without complication   -Check hemoglobin A1c, place on low-dose sliding scale insulin every 6 hours due to n.p.o. status  Continue home medications for chronic medical conditions as noted above including but not limited to type 2 diabetes without complication, dementia, COPD, hypertension, and hyperlipidemia.           Diet Diet NPO     DVT Prophylaxis [x] Lovenox, [] Heparin, [] SCDs, [] Ambulation   GI Prophylaxis [x] PPI,  [] H2 Blocker,  [] Carafate,  [] Diet/Tube Feeds   Code Status Full Code   Disposition Patient requires continued admission due to right hip fracture      MDM [] Low, [x] Moderate,[]  High  Patient's risk as above      Patient discussed with and evaluated by Dr Shazia Taylor who agrees with the above diagnosis, treatment and disposition.       Consultations Ordered:  IP CONSULT TO ORTHOPEDIC SURGERY  IP CONSULT TO HOSPITALIST    Electronically signed by TOBY Muhammad CNP on 9/13/21 at 12:41 PM EDT

## 2021-09-13 NOTE — CONSULTS
Consult to Orthopedic Surgery  Consult performed by: Lorraine Thomson DO  Consult ordered by: Brendan Cm MD  Reason for consult: Right hip fracture  Assessment/Recommendations:       Right femoral neck fracture    I discussed with her today her x-ray findings. I explained to her that she does have a right-sided hip fracture. Given her dementia I will attempt to contact her power of  to discuss conservative versus surgical options and if they opt to proceed with surgical treatment we will plan on proceeding with right hip hemiarthroplasty tomorrow. Continue bedrest.  Remain nonweightbearing right lower extremity. Pain medication as needed. Hospitalist will provide preoperative clearance. She will be kept n.p.o. after midnight tonight for planned surgical treatment tomorrow. Melony Alex is a 80-year-old female with advanced Alzheimer's disease who apparently sustained a fall at some point landing under her right hip. Today she was found to have pain with attempted range of motion of the right hip and difficulty with sitting in a chair so she was brought to [facility]. X-rays were obtained and she was diagnosed with a right hip fracture. She was subsequently mated to the hospitalist for medical management and I was consulted for evaluation treatment of her right hip fracture. She does have advanced dementia so the majority of her history is obtained through medical records. She was complaining of sharp, stabbing pain globally in her right hip with any attempted range of motion of the right hip. Patient denies any prior history of pain or injury to the involved extremity/ joint, denies numbness or tingling in the involved extremity and denies fever or chills. Review of Systems   Constitutional: Negative for activity change, chills and fever. Respiratory: Negative for chest tightness. Cardiovascular: Negative for chest pain.    Musculoskeletal: Positive for arthralgias, gait problem, joint swelling and myalgias. Negative for back pain. Skin: Negative for color change, pallor, rash and wound. Neurological: Negative for weakness and numbness. Psychiatric/Behavioral: Positive for confusion. Past Medical History:   Diagnosis Date    Aortic valvular disease     Asthma     B12 deficiency     COPD (chronic obstructive pulmonary disease) (HCC)     Dementia (HCC)     DVT (deep vein thrombosis) in pregnancy     H/O Doppler ABD [place] ultrasound 06/05/2019    NO evidence of AAA within the visualized portions of the abdominal aorta, possible right iliac artery stenosis    H/O Doppler lower venous ultrasound 06/05/2019    NO DVT OR SVT, No significant reflux in right, Significant reflux in Left CFV    H/O echocardiogram 06/05/2019    EF 45%, Moderate basal anteroseptal wall asymmetrical left ventricular hypertrophy, Left atrium mild to mod dilated, Mild to Mod AS, Mitral stenosis is severe, Mod AR and Mod Pulmonic regurg, Mild MR, TR, Mod Pulm HTN    Hyperlipidemia     Hypertension     Hypothyroidism     Iron deficiency     Osteoarthritis     Peripheral neuropathy     on EMG    Pulmonary nodule     thought benign    Seasonal allergies     Spinal stenosis     Type 2 diabetes mellitus without complication ([facility] [address]      No current facility-administered medications on file prior to encounter.      Current Outpatient Medications on File Prior to Encounter   Medication Sig Dispense Refill    FUROSEMIDE PO Take 10 mg by mouth daily      melatonin 5 MG TABS tablet Take 5 mg by mouth nightly      levothyroxine (SYNTHROID) 75 MCG tablet Take 75 mcg by mouth Daily      Cholecalciferol (VITAMIN D3) 125 MCG (5000 UT) TABS Take 5,000 Units by mouth daily      busPIRone (BUSPAR) 5 MG tablet Take 5 mg by mouth 2 times daily      acetaminophen (TYLENOL) 325 MG tablet Take 650 mg by mouth every 6 hours as needed for Pain      sertraline (ZOLOFT) 50 MG tablet Take 1 tablet by mouth daily 30 tablet 5    rivaroxaban (XARELTO) 15 MG TABS tablet Take 1 tablet by mouth daily 90 tablet 1     Allergies   Allergen Reactions    Iodine Shortness Of Breath    Bee Pollen Swelling    Tetanus Toxoids Itching    Penicillins Rash     Past Surgical History:   Procedure Laterality Date    APPENDECTOMY      BREAST BIOPSY     [address]    had teeth pulled    EYE SURGERY      KNEE SURGERY Right 1990    LYMPHADENECTOMY  1953    lymph gland removed from neck    NOSE SURGERY      broken nose    ROTATOR CUFF REPAIR Left 2005    surgery    KAREN AND BSO  1971    [address]ACIC [address]    removed 3 ribs    TONSILLECTOMY AND ADENOIDECTOMY       Social History     Tobacco Use    Smoking status: Former Smoker    Smokeless tobacco: Never Used   Vaping Use    Vaping Use: Never used   Substance Use Topics    Alcohol use: No    Drug use: No     Family History   Problem Relation Age of Onset    High Blood Pressure Mother     Heart Disease Mother     Stroke Mother     Osteoporosis Mother     High Blood Pressure Father     Heart Disease Father     Stroke Father     Cancer Sister     Heart Disease Brother     Cancer Brother        Right Knee Exam     Tenderness   The patient is experiencing no tenderness. Other   Erythema: absent  Sensation: normal  Pulse: present  Swelling: none  Effusion: no effusion present      Left Knee Exam     Muscle Strength   The patient has normal left knee strength. Tenderness   The patient is experiencing no tenderness. Range of Motion   The patient has normal left knee ROM. Other   Erythema: absent  Sensation: normal  Pulse: present  Swelling: none  Effusion: no effusion present      Right Hip Exam     Tenderness   The patient is experiencing tenderness in the anterior, greater trochanter, lateral and posterior.     Other   Erythema: absent  Sensation: normal  Pulse: present    Comments:  Right hip-skin intact, moderate ecchymosis over the lateral aspect of the right hip. The right leg is held in a position of shortening and external rotation compared to contralateral side. Severe pain with attempted range of motion of the right hip. Full range of motion not assessed due to recent injury. Intact sensation motor function to all nerve distributions of the extremity. +2 DP  Compartment soft. Left Hip Exam     Tenderness   The patient is experiencing no tenderness. Range of Motion   The patient has normal left hip ROM. Muscle Strength   The patient has normal left hip strength. Other   Erythema: absent  Sensation: normal  Pulse: present            /72   Pulse 99   Temp 98.4 °F (36.9 °C) (Oral)   Resp 21   Ht 5' 5\" (1.651 m)   Wt 100 lb (45.4 kg)   BMI 16.64 kg/m²       XR PELVIS (1-2 VIEWS)    Result Date: 9/13/2021  EXAMINATION: ONE XRAY VIEW OF THE PELVIS 9/13/2021 8:27 am COMPARISON: Radiograph of the pelvis dated 05/23/2021. HISTORY: ORDERING SYSTEM PROVIDED HISTORY: trauma TECHNOLOGIST PROVIDED HISTORY: Reason for exam:->trauma Reason for Exam: trauma Acuity: Acute Type of Exam: Initial Mechanism of Injury:  pt found by staff sitting in chair, c/o hip pain; pt unable to bare weight; pt with large bruise on rt hip but does not recall falling FINDINGS: There is mildly displaced fracture of the right femoral neck, which appears to involve the subcapital region. There is also additional equivocal cortical irregularity of the greater trochanter. No evidence of dislocation is seen. The bones appear demineralized. Evaluation of the sacrum is limited due to overlying bowel gas. The sacroiliac joints appear grossly symmetric. The pubic symphysis appears congruent. Mild osteoarthritis affects the pubic symphysis. Mildly displaced fracture of the right femoral neck involving the subcapital region with additional equivocal cortical irregularity of the greater trochanter.  The bones appear demineralized. XR FEMUR RIGHT (MIN 2 VIEWS)    Result Date: 9/13/2021  EXAMINATION: FOUR XRAY VIEWS OF THE RIGHT KNEE;   XRAY VIEWS OF THE RIGHT FEMUR 9/13/2021 8:27 am COMPARISON: None. HISTORY: ORDERING SYSTEM PROVIDED HISTORY: trauma TECHNOLOGIST PROVIDED HISTORY: Reason for exam:->trauma Reason for Exam: trauma Acuity: Acute Type of Exam: Initial Mechanism of Injury:  pt found by staff sitting in chair, c/o hip pain; pt unable to bare weight; pt with large bruise on rt hip but does not recall falling FINDINGS: Evaluation is partially limited due to inability to optimally position the patient and diffuse bony demineralization. There is fracture of the right femoral neck. Please refer to the report of radiograph of the pelvis obtained on the same day for further evaluation. The shaft of right femur appears intact. Mild osteoarthritis affects the right knee without convincing radiographic evidence of acute fracture or dislocation of the right knee seen. No evidence of joint effusion is seen. Vascular calcification changes are seen. Right femoral neck fracture. Please refer to the report of the radiograph of the pelvis obtained on the same day for further evaluation. The shaft of the right femur and right knee appear intact. The bones appear demineralized. XR KNEE RIGHT (MIN 4 VIEWS)    Result Date: 9/13/2021  EXAMINATION: FOUR XRAY VIEWS OF THE RIGHT KNEE;   XRAY VIEWS OF THE RIGHT FEMUR 9/13/2021 8:27 am COMPARISON: None. HISTORY: ORDERING SYSTEM PROVIDED HISTORY: trauma TECHNOLOGIST PROVIDED HISTORY: Reason for exam:->trauma Reason for Exam: trauma Acuity: Acute Type of Exam: Initial Mechanism of Injury:  pt found by staff sitting in chair, c/o hip pain; pt unable to bare weight; pt with large bruise on rt hip but does not recall falling FINDINGS: Evaluation is partially limited due to inability to optimally position the patient and diffuse bony demineralization.  There is fracture of the right femoral neck. Please refer to the report of radiograph of the pelvis obtained on the same day for further evaluation. The shaft of right femur appears intact. Mild osteoarthritis affects the right knee without convincing radiographic evidence of acute fracture or dislocation of the right knee seen. No evidence of joint effusion is seen. Vascular calcification changes are seen. Right femoral neck fracture. Please refer to the report of the radiograph of the pelvis obtained on the same day for further evaluation. The shaft of the right femur and right knee appear intact. The bones appear demineralized. Graylon Neil

## 2021-09-13 NOTE — ED PROVIDER NOTES
EMERGENCY DEPARTMENT ENCOUNTER      CHIEF COMPLAINT    Chief Complaint   Patient presents with    Hip Pain       HPI    Johnnie Galdamez is a 80 y.o. female with history significant for valvular disease, B12 deficiency hypertension hyperlipidemia type 2 diabetes, dementia residing the dementia cloud who presents with hip pain. Patient came from [facility] were patient was found by staff sitting in a chair complaining about hip pain, unable to bear weight with a big bruise, patient does have Alzheimer's poor memories, does not recall falling or syncope episodes and denies any other trauma patient said her whole body hurts. REVIEW OF SYSTEMS    Constitutional: Denies chills, fatigue, unexpected weight loss or fever. + Whole body pain  HENT: Denies sore throat or rhinorrhea. Eyes: Denies vision changes. Respiratory: Denies shortness of breath or cough. Cardiovascular: Denies chest pain, leg swelling or palpitations. Gastrointestinal: Denies abdominal pain, diarrhea, nausea and vomiting. Genitourinary: Denies dysuria or hematuria. Skin: Denies rashes or wounds. MSK: Right-sided hip pain  Neurologic: Denies headache, lightheadedness, numbness, or weakness.    Hematologic/lymphatic: Denies unexpected weight loss, night sweats  Endocrine: No polyuria, polydipsia, or polyphagia      Pertinent positives and negatives are delineated in HPI and ROS above, all other systems are reviewed and are negative    PAST MEDICAL HISTORY    Past Medical History:   Diagnosis Date    Aortic valvular disease     Asthma     B12 deficiency     [facility]ch[facility] disease) ([facility] [address]     Dementia ([facility] [address]     DVT (deep vein thrombosis) in pregnancy     H/O Doppler ABD Aortia ultrasound 06/05/2019    NO evidence of AAA within the visualized portions of the abdominal aorta, possible right iliac artery stenosis    H/O Doppler lower venous ultrasound 06/05/2019    NO DVT OR SVT, No significant reflux in right, Significant reflux in Left CFV    H/O echocardiogram 06/05/2019    EF 45%, Moderate basal anteroseptal wall asymmetrical left ventricular hypertrophy, Left atrium mild to mod dilated, Mild to Mod AS, Mitral stenosis is severe, Mod AR and Mod Pulmonic regurg, Mild MR, TR, Mod Pulm HTN    Hyperlipidemia     Hypertension     Hypothyroidism     Iron deficiency     Osteoarthritis     Peripheral neuropathy     on EMG    Pulmonary nodule     thought benign    Seasonal allergies     Spinal stenosis     Type 2 diabetes mellitus without complication ([facility] [address]      Medical history reviewed and no pertinent past medical history other than the ones mentioned above    SURGICAL HISTORY    Past Surgical History:   Procedure Laterality Date    APPENDECTOMY      BREAST [address]OPSY     [address]    had teeth pulled    EYE SURGERY      KNEE SURGERY Right 1990    LYMPHADENECTOMY  1953    lymph gland removed from neck    NOSE SURGERY      broken nose    ROTATOR CUFF REPAIR Left 2005    surgery    KAREN AND BSO  1971    TH[address]ACIC [address]    removed 3 ribs    TONSILLECTOMY AND ADENOIDECTOMY       Surgical history reviewed and no pertinent surgical history other than the ones mentioned above    CURRENT MEDICATIONS    Current Outpatient Rx   Medication Sig Dispense Refill    levothyroxine (SYNTHROID) 50 MCG tablet Take 1 tablet by mouth daily 30 tablet 5    sertraline (ZOLOFT) 50 MG tablet Take 1 tablet by mouth daily 30 tablet 5    rivaroxaban (XARELTO) 15 MG TABS tablet Take 1 tablet by mouth daily 90 tablet 1     Medication is reviewed    ALLERGIES    Allergies   Allergen Reactions    Iodine Shortness Of Breath    Bee Pollen Swelling    Tetanus Toxoids Itching    Penicillins Rash     Allergy is reviewed    FAMILY HISTORY    Family History   Problem Relation Age of Onset    High Blood Pressure Mother     Heart Disease Mother     Stroke Mother     Osteoporosis Mother     High Blood Pressure Father     Heart Disease Father     Stroke Father     Cancer Sister     Heart Disease Brother     Cancer Brother      Family history reviewed and no pertinent family history other than the ones mentioned above    SOCIAL HISTORY    Social History     Socioeconomic History    Marital status:      Spouse name: Not on file    Number of children: Not on file    Years of education: Not on file    Highest education level: Not on file   Occupational History    Not on file   Tobacco Use    Smoking status: Former Smoker    Smokeless tobacco: Never Used   Vaping Use    Vaping Use: Never used   Substance and Sexual Activity    Alcohol use: No    Drug use: No    Sexual activity: Not on file   Other Topics Concern    Not on file   Social History Narrative    Not on file     Social Determinants of Health     Financial Resource Strain:     Difficulty of Paying Living Expenses:    Food Insecurity:     Worried About [address] in the Last Year:     [address] [employer] in the Last Year:    Transportation Needs:     Lack of Transportation (Medical):      Lack of Transportation (Non-Medical):    Physical Activity:     Days of Exercise per Week:     Minutes of Exercise per Session:    Stress:     Feeling of Stress :    Social Connections:     Frequency of Communication with Friends and Family:     Frequency of Social Gatherings with Friends and Family:     Attends Zoroastrian Services:     Active Member of Clubs or Organizations:     Attends Club or Organization Meetings:     Marital Status:    Intimate Partner Violence:     Fear of Current or Ex-Partner:     Emotionally Abused:     Physically Abused:     Sexually Abused:      Live with nursing facility for Alzheimer  Alcohol and recreational drug use: Denies  Social history reviewed and no pertinent social history other than the ones mentioned above    PHYSICAL EXAM    Vital Signs:BP (!) 142/75   Pulse 97   Temp 98.4 °F (36.9 °C) (Oral) Resp 20   Ht 5' 5\" (1.651 m)   Wt 100 lb (45.4 kg)   BMI 16.64 kg/m²   I have reviewed the triage vital signs. Constitutional: Cachectic, advanced age  Eyes: PERRL, no conjunctival injection  HENT: NCAT, Neck supple without meningismus   CV: RRR, Warm, no edema, tender to palpation diffusely  RESP: Normal RR, no increased respiratory efforts  GI: soft, non-distended, mildly tender to palpation diffusely  MSK: No C and T-spine pain but does have L-spine pain, right-sided hip has a big area of ecchymosis to posterior hip but no obvious hematoma, neurovascularly intact, range of motion limited by pain, no open fracture  Skin: Warm, dry. No rashes  Neuro: Alert, CNs II-XII grossly intact. Moving all 4 extremities  Psych: Appropriate mood and affect. Labs:   Labs Reviewed   BASIC METABOLIC PANEL W/ REFLEX TO MG FOR LOW K   CBC WITH AUTO DIFFERENTIAL   PROTIME-INR   TYPE AND SCREEN       Radiology:  XR PELVIS (1-2 VIEWS)   Final Result   Mildly displaced fracture of the right femoral neck involving the subcapital   region with additional equivocal cortical irregularity of the greater   trochanter. The bones appear demineralized. XR KNEE RIGHT (MIN 4 VIEWS)   Final Result   Right femoral neck fracture. Please refer to the report of the radiograph of   the pelvis obtained on the same day for further evaluation. The shaft of the   right femur and right knee appear intact. The bones appear demineralized. XR FEMUR RIGHT (MIN 2 VIEWS)   Final Result   Right femoral neck fracture. Please refer to the report of the radiograph of   the pelvis obtained on the same day for further evaluation. The shaft of the   right femur and right knee appear intact. The bones appear demineralized.          CT HEAD WO CONTRAST    (Results Pending)   CT CERVICAL SPINE W CONTRAST    (Results Pending)   CT THORACIC RECONSTRUCTION WO POST PROCESS    (Results Pending)   CT LUMBAR RECONSTRUCTION WO POST PROCESS (Results Pending)   CT CHEST WO CONTRAST    (Results Pending)   CT ABDOMEN PELVIS WO CONTRAST Additional Contrast? None    (Results Pending)       I directly reviewed the images and radiology interpretation    ED COURSE  Assessment & Medical Decision Making:  Fabian Reynolds is a 80 y.o. female who presents with possible fall with hip pain, patient cannot provide much history does have diffuse body ache therefore pan scan was ordered, patient does have right-sided PICC hip ecchymosis, ATLS was also followed throughout the entire process. Patient's primary survey is unremarkable with secondary survey mainly positive for ecchymosis, x-ray on my interpretation demonstrated femoral neck fracture confirmed by radiology interpretation as well, if patient the rest of the trauma work-up was unremarkable, patient will be admitted over here, discussed patient's femoral neck fracture with Dr. Phuong Ortega, agreeable towards admission here if no other trauma noticed. DDx includes but not limited to: Head injury, hip injury, dislocation, fracture, chest wall injuries, abdominal injuries    Workup includes but not limited to: CTs, x-rays, labs    Treatment includes but not limited to: Pain control as needed    Critical care time:   Critical Care  I provided 35 minutes of critical care time including initial history and exam, development of the treatment plan, initiating ATLS algorithm, order management, review of treatment plan with nursing, frequent reevaluation of cardiopulmonary status, discussion with consultants and family, arranging admission, review of the medical record. Total number of minutes spent in direct care of this critically ill patients excludes separately reportable procedures. Impression:   1. Right femoral neck fracture    Disposition: Admission versus transfer pending rest of the CTs    This note dictated using Dragon medical voice recognition software.  Attempts at proofreading were made, but errors may occasionally still occur.           Adrián Latif MD  09/13/21 1003

## 2021-09-13 NOTE — PROGRESS NOTES
Medication History  [facility]    Patient Name: Kierra Bryant 6/11/1931     Medication history has been completed by: Sanjay Mcpherson CPhT    Source(s) of information: MAR provided by [facility]     Primary Care Physician: No primary care provider on file. Pharmacy:     Allergies as of 09/13/2021 - Fully Reviewed 09/13/2021   Allergen Reaction Noted    Iodine Shortness Of Breath 12/11/2018    Bee pollen Swelling 12/11/2018    Tetanus toxoids Itching 12/11/2018    Penicillins Rash 12/11/2018        Prior to Admission medications    Medication Sig Start Date End Date Taking? Authorizing Provider   FUROSEMIDE PO Take 10 mg by mouth daily   Yes Historical Provider, MD   melatonin 5 MG TABS tablet Take 5 mg by mouth nightly   Yes Historical Provider, MD   levothyroxine (SYNTHROID) 75 MCG tablet Take 75 mcg by mouth Daily   Yes Historical Provider, MD   Cholecalciferol (VITAMIN D3) 125 MCG (5000 UT) TABS Take 5,000 Units by mouth daily   Yes Historical Provider, MD   busPIRone (BUSPAR) 5 MG tablet Take 5 mg by mouth 2 times daily   Yes Historical Provider, MD   acetaminophen (TYLENOL) 325 MG tablet Take 650 mg by mouth every 6 hours as needed for Pain   Yes Historical Provider, MD   sertraline (ZOLOFT) 50 MG tablet Take 1 tablet by mouth daily 2/9/21  Yes Sydney Castro MD   rivaroxaban (XARELTO) 15 MG TABS tablet Take 1 tablet by mouth daily 2/9/21  Yes Sydney Castro MD     Medications added or changed (ex.  new medication, dosage change, interval change, formulation change):  Furosemide (added)  Melatonin (added)  Vitamin D (added)  Levothyroxine dosage change from 50 mcg to 75 mcg daily  Buspirone (added)  Tylenol prn (added)    Comments:  MAR provided by [facility]    To my knowledge the above medication history is accurate as of 9/13/2021 11:01 AM.   Sanjay Mcpherson CPhT   9/13/2021 11:01 AM   3

## 2021-09-13 NOTE — ANESTHESIA PRE PROCEDURE
Department of Anesthesiology  Preprocedure Note       Name:  Vivian Alvarez   Age:  80 y.o.  :  1931                                          MRN:  [id number]         Date:  2021      Surgeon: Angel Reyes):  Ke Parrish DO    Procedure: Procedure(s):  RIGHT HIP HEMIARTHROPLASTY    Medications prior to admission:   Prior to Admission medications    Medication Sig Start Date End Date Taking?  Authorizing Provider   FUROSEMIDE PO Take 10 mg by mouth daily   Yes Historical Provider, MD   melatonin 5 MG TABS tablet Take 5 mg by mouth nightly   Yes Historical Provider, MD   levothyroxine (SYNTHROID) 75 MCG tablet Take 75 mcg by mouth Daily   Yes Historical Provider, MD   Cholecalciferol (VITAMIN D3) 125 MCG (5000 UT) TABS Take 5,000 Units by mouth daily   Yes Historical Provider, MD   busPIRone (BUSPAR) 5 MG tablet Take 5 mg by mouth 2 times daily   Yes Historical Provider, MD   acetaminophen (TYLENOL) 325 MG tablet Take 650 mg by mouth every 6 hours as needed for Pain   Yes Historical Provider, MD   sertraline (ZOLOFT) 50 MG tablet Take 1 tablet by mouth daily 21  Yes Layo Martinez MD   rivaroxaban Rachel Reji) 15 MG TABS tablet Take 1 tablet by mouth daily 21  Yes Layo Martinez MD       Current medications:    Current Facility-Administered Medications   Medication Dose Route Frequency Provider Last Rate Last Admin    lidocaine 1 % injection 5 mL  5 mL IntraDERmal Once Vini Lai MD         Current Outpatient Medications   Medication Sig Dispense Refill    FUROSEMIDE PO Take 10 mg by mouth daily      melatonin 5 MG TABS tablet Take 5 mg by mouth nightly      levothyroxine (SYNTHROID) 75 MCG tablet Take 75 mcg by mouth Daily      Cholecalciferol (VITAMIN D3) 125 MCG (5000 UT) TABS Take 5,000 Units by mouth daily      busPIRone (BUSPAR) 5 MG tablet Take 5 mg by mouth 2 times daily      acetaminophen (TYLENOL) 325 MG tablet Take 650 mg by mouth every 6 hours as needed for Pain      sertraline (ZOLOFT) 50 MG tablet Take 1 tablet by mouth daily 30 tablet 5    rivaroxaban (XARELTO) 15 MG TABS tablet Take 1 tablet by mouth daily 90 tablet 1       Allergies:     Allergies   Allergen Reactions    Iodine Shortness Of Breath    Bee Pollen Swelling    Tetanus Toxoids Itching    Penicillins Rash       Problem List:    Patient Active Problem List   Diagnosis Code    Age-related osteoporosis without current pathological fracture M81.0    History of recurrent deep vein thrombosis (DVT) Z86.718    Restrictive lung disease J98.4    MCI (mild cognitive impairment) G31.84    Chronic anticoagulation Z79.01    Physical deconditioning R53.81    Chronic venous stasis dermatitis I87.2    Nonrheumatic aortic valve stenosis I35.0    PAD (peripheral artery disease) ([facility]) I73.9    Spinal stenosis M48.00    Hypothyroidism E03.9    Hypertension I10    DVT (deep vein thrombosis) in pregnancy O22.30    VHD (valvular heart disease) I38    Pain of left lower extremity M79.605    Varicose veins of both legs with edema I83.893    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    Right iliac artery stenosis ([facility]) I77.1    LVH (left ventricular hypertrophy) I51.7    Forgetfulness R68.89    Paranoia ([facility]) F22    Peripheral circulatory disorder due to type 2 diabetes mellitus ([facility]) E11.51       Past Medical History:        Diagnosis Date    Aortic valvular disease     Asthma     B12 deficiency     COPD (chronic obstructive pulmonary disease) ([facility])     Dementia ([facility])     DVT (deep vein thrombosis) in pregnancy     H/O Doppler ABD [facility] ultrasound 06/05/2019    NO evidence of AAA within the visualized portions of the abdominal aorta, possible right iliac artery stenosis    H/O Doppler lower venous ultrasound 06/05/2019    NO DVT OR SVT, No significant reflux in right, Significant reflux in Left CFV    H/O echocardiogram 06/05/2019    EF 45%, Moderate basal anteroseptal wall asymmetrical left ventricular hypertrophy, Left atrium mild to mod dilated, Mild to Mod AS, Mitral stenosis is severe, Mod AR and Mod Pulmonic regurg, Mild MR, TR, Mod Pulm HTN    Hyperlipidemia     Hypertension     Hypothyroidism     Iron deficiency     Osteoarthritis     Peripheral neuropathy     on EMG    Pulmonary nodule     thought benign    Seasonal allergies     Spinal stenosis     Type 2 diabetes mellitus without complication ([facility] [address]        Past Surgical History:        Procedure Laterality Date    APPENDECTOMY      BREAST BIOPSY     [address]    had teeth pulled    EYE SURGERY      KNEE SURGERY Right 1990    LYMPHADENECTOMY  1953    lymph gland removed from neck    NOSE SURGERY      broken nose    ROTATOR CUFF REPAIR Left 2005    surgery    KAREN AND BSO  1971    THORACIC [address]    removed 3 ribs    TONSILLECTOMY AND ADENOIDECTOMY         Social History:    Social History     Tobacco Use    Smoking status: Former Smoker    Smokeless tobacco: Never Used   Substance Use Topics    Alcohol use: No                                Counseling given: Not Answered      Vital Signs (Current):   Vitals:    09/13/21 0829 09/13/21 1015   BP: (!) 142/75 (!) 140/78   Pulse: 97 96   Resp: 20 20   Temp: 36.9 °C (98.4 °F)    TempSrc: Oral    Weight: 100 lb (45.4 kg)    Height: 5' 5\" (1.651 m)                                               BP Readings from Last 3 Encounters:   09/13/21 (!) 140/78   05/23/21 (!) 140/85   04/04/21 (!) 155/69       NPO Status:                                                                                 BMI:   Wt Readings from Last 3 Encounters:   09/13/21 100 lb (45.4 kg)   04/04/21 100 lb (45.4 kg)   02/09/21 90 lb (40.8 kg)     Body mass index is 16.64 kg/m².     CBC:   Lab Results   Component Value Date    WBC 12.6 09/13/2021    RBC 4.01 09/13/2021    HGB 10.7 09/13/2021    HCT 34.2 09/13/2021    MCV 85.3 09/13/2021    RDW 15.7 09/13/2021     09/13/2021       CMP:   Lab Results   Component Value Date     09/13/2021    K 3.8 09/13/2021    CL 98 09/13/2021    CO2 25 09/13/2021    BUN 22 09/13/2021    CREATININE 0.7 09/13/2021    GFRAA >60 09/13/2021    AGRATIO 1.8 12/08/2020    LABGLOM >60 09/13/2021    GLUCOSE 129 09/13/2021    PROT 6.8 07/06/2021    CALCIUM 8.9 09/13/2021    BILITOT 0.4 07/06/2021    ALKPHOS 88 07/06/2021    AST 18 07/06/2021    ALT 10 07/06/2021       POC Tests: No results for input(s): POCGLU, POCNA, POCK, POCCL, POCBUN, POCHEMO, POCHCT in the last 72 hours. Coags:   Lab Results   Component Value Date    PROTIME 17.6 09/13/2021    INR 1.36 09/13/2021       HCG (If Applicable): No results found for: PREGTESTUR, PREGSERUM, HCG, HCGQUANT     ABGs: No results found for: PHART, PO2ART, RNO6LDB, DJO7MAJ, BEART, L8CBTXIF     Type & Screen (If Applicable):  No results found for: LABABO, LABRH    Drug/Infectious Status (If Applicable):  No results found for: HIV, HEPCAB    COVID-19 Screening (If Applicable): No results found for: COVID19        Anesthesia Evaluation  Patient summary reviewed  Airway: Mallampati: II  TM distance: <3 FB   Neck ROM: full  Mouth opening: > = 3 FB Dental:    (+) upper dentures and lower dentures      Pulmonary:   (+) COPD: mild,                             Cardiovascular:  Exercise tolerance: poor (<4 METS),   (+) hypertension:, valvular problems/murmurs: AS and MR, pulmonary hypertension: moderate,       ECG reviewed      Echocardiogram reviewed               ROS comment: Summary   Left ventricular systolic function is mildly depressed with an ejection   fraction of 45%. Moderate basal anteroseptal wall asymmetrical left ventricular hypertrophy. The left atrium is mild to moderately dilated. Mild to moderate aortic stenosis with mean gradient of 17 mmHg. Mitral stenosis is present with a mean pressure of 12 and MVA (continuity)   of 0.98cm2 suggestive of severe stenosis.    Doppler evaluation reveals moderate aortic and pulmonic regurgitation and   mild mitral and tricuspid regurgitation. Right ventricular systolic pressure of 57 mmHg consistent with moderate   pulmonary hypertension. No evidence of pericardial effusion. Patient needs Right & Left heart cath for further risk stratification. Signature      ------------------------------------------------------------------   Electronically signed by Ramon Lopez MD (Interpreting   physician) on 06/05/2019 at 05:52 PM   ------------------------------------------------------------------       Summary   Left ventricular systolic function is mildly depressed with an ejection   fraction of 45%. Moderate basal anteroseptal wall asymmetrical left ventricular hypertrophy. The left atrium is mild to moderately dilated. Mild to moderate aortic stenosis with mean gradient of 17 mmHg. Mitral stenosis is present with a mean pressure of 12 and MVA (continuity)   of 0.98cm2 suggestive of severe stenosis. Doppler evaluation reveals moderate aortic and pulmonic regurgitation and   mild mitral and tricuspid regurgitation. Right ventricular systolic pressure of 57 mmHg consistent with moderate   pulmonary hypertension. No evidence of pericardial effusion. Patient needs Right & Left heart cath for further risk stratification. Signature      ------------------------------------------------------------------   Electronically signed by Ramon Lopez MD (Interpreting   physician) on 06/05/2019 at 05:52 PM   ------------------------------------------------------------------        Neuro/Psych:   (+) neuromuscular disease:, psychiatric history:             ROS comment: Dementia  GI/Hepatic/Renal:             Endo/Other:    (+) DiabetesType II DM, , hypothyroidism, blood dyscrasia: anticoagulation therapy, arthritis: OA., . Pt had no PAT visit       Abdominal:             Vascular:   + PVD, aortic or cerebral, DVT, .        Other Findings:           Anesthesia Plan      general     ASA 4

## 2021-09-13 NOTE — ED TRIAGE NOTES
Pt from [facility].  Alzheimer's  Caruso; pt found by staff sitting in chair, c/o hip pain; pt unable to bare weight; pt with large bruise on rt hip but does not recall falling

## 2021-09-13 NOTE — ED NOTES
Pt used hip pain for urination; resting in bed and denies complaints at this time     Anette Huerta, BETTE  09/13/21 9904

## 2021-09-14 ENCOUNTER — ANESTHESIA (OUTPATIENT)
Dept: OPERATING ROOM | Age: 86
DRG: 521 | End: 2021-09-14
Payer: MEDICARE

## 2021-09-14 ENCOUNTER — APPOINTMENT (OUTPATIENT)
Dept: ULTRASOUND IMAGING | Age: 86
DRG: 521 | End: 2021-09-14
Payer: MEDICARE

## 2021-09-14 ENCOUNTER — APPOINTMENT (OUTPATIENT)
Dept: GENERAL RADIOLOGY | Age: 86
DRG: 521 | End: 2021-09-14
Payer: MEDICARE

## 2021-09-14 VITALS
DIASTOLIC BLOOD PRESSURE: 76 MMHG | TEMPERATURE: 97.7 F | RESPIRATION RATE: 9 BRPM | OXYGEN SATURATION: 100 % | SYSTOLIC BLOOD PRESSURE: 124 MMHG

## 2021-09-14 LAB
ALBUMIN SERPL-MCNC: 3.7 GM/DL (ref 3.4–5)
ALP BLD-CCNC: 84 IU/L (ref 40–129)
ALT SERPL-CCNC: 14 U/L (ref 10–40)
ANION GAP SERPL CALCULATED.3IONS-SCNC: 12 MMOL/L (ref 4–16)
AST SERPL-CCNC: 27 IU/L (ref 15–37)
BILIRUB SERPL-MCNC: 0.6 MG/DL (ref 0–1)
BILIRUBIN DIRECT: 0.2 MG/DL (ref 0–0.3)
BILIRUBIN, INDIRECT: 0.4 MG/DL (ref 0–0.7)
BUN BLDV-MCNC: 29 MG/DL (ref 6–23)
CALCIUM SERPL-MCNC: 8.7 MG/DL (ref 8.3–10.6)
CHLORIDE BLD-SCNC: 99 MMOL/L (ref 99–110)
CO2: 28 MMOL/L (ref 21–32)
CREAT SERPL-MCNC: 0.9 MG/DL (ref 0.6–1.1)
EKG ATRIAL RATE: 91 BPM
EKG DIAGNOSIS: NORMAL
EKG P AXIS: 73 DEGREES
EKG P-R INTERVAL: 148 MS
EKG Q-T INTERVAL: 390 MS
EKG QRS DURATION: 74 MS
EKG QTC CALCULATION (BAZETT): 479 MS
EKG R AXIS: 39 DEGREES
EKG T AXIS: 63 DEGREES
EKG VENTRICULAR RATE: 91 BPM
ESTIMATED AVERAGE GLUCOSE: 105 MG/DL
GFR AFRICAN AMERICAN: >60 ML/MIN/1.73M2
GFR NON-AFRICAN AMERICAN: 59 ML/MIN/1.73M2
GLUCOSE BLD-MCNC: 107 MG/DL (ref 70–99)
GLUCOSE BLD-MCNC: 52 MG/DL (ref 70–99)
GLUCOSE BLD-MCNC: 96 MG/DL (ref 70–99)
HBA1C MFR BLD: 5.3 % (ref 4.2–6.3)
HCT VFR BLD CALC: 33 % (ref 37–47)
HEMOGLOBIN: 10 GM/DL (ref 12.5–16)
INR BLD: 1.15 INDEX
LV EF: 58 %
LVEF MODALITY: NORMAL
MCH RBC QN AUTO: 26.5 PG (ref 27–31)
MCHC RBC AUTO-ENTMCNC: 30.3 % (ref 32–36)
MCV RBC AUTO: 87.3 FL (ref 78–100)
PDW BLD-RTO: 15.9 % (ref 11.7–14.9)
PLATELET # BLD: 156 K/CU MM (ref 140–440)
PMV BLD AUTO: 12.4 FL (ref 7.5–11.1)
POTASSIUM SERPL-SCNC: 3.9 MMOL/L (ref 3.5–5.1)
PROTHROMBIN TIME: 14.9 SECONDS (ref 11.7–14.5)
RBC # BLD: 3.78 M/CU MM (ref 4.2–5.4)
SODIUM BLD-SCNC: 139 MMOL/L (ref 135–145)
TOTAL PROTEIN: 6.4 GM/DL (ref 6.4–8.2)
VITAMIN D 25-HYDROXY: 50.99 NG/ML
WBC # BLD: 9.6 K/CU MM (ref 4–10.5)

## 2021-09-14 PROCEDURE — 6360000002 HC RX W HCPCS: Performed by: NURSE ANESTHETIST, CERTIFIED REGISTERED

## 2021-09-14 PROCEDURE — 2580000003 HC RX 258: Performed by: ORTHOPAEDIC SURGERY

## 2021-09-14 PROCEDURE — 93010 ELECTROCARDIOGRAM REPORT: CPT | Performed by: INTERNAL MEDICINE

## 2021-09-14 PROCEDURE — 99223 1ST HOSP IP/OBS HIGH 75: CPT | Performed by: INTERNAL MEDICINE

## 2021-09-14 PROCEDURE — 6360000002 HC RX W HCPCS: Performed by: ORTHOPAEDIC SURGERY

## 2021-09-14 PROCEDURE — P9045 ALBUMIN (HUMAN), 5%, 250 ML: HCPCS | Performed by: NURSE ANESTHETIST, CERTIFIED REGISTERED

## 2021-09-14 PROCEDURE — 6370000000 HC RX 637 (ALT 250 FOR IP): Performed by: NURSE PRACTITIONER

## 2021-09-14 PROCEDURE — 85027 COMPLETE CBC AUTOMATED: CPT

## 2021-09-14 PROCEDURE — 7100000000 HC PACU RECOVERY - FIRST 15 MIN: Performed by: ORTHOPAEDIC SURGERY

## 2021-09-14 PROCEDURE — 73502 X-RAY EXAM HIP UNI 2-3 VIEWS: CPT

## 2021-09-14 PROCEDURE — 6370000000 HC RX 637 (ALT 250 FOR IP): Performed by: ORTHOPAEDIC SURGERY

## 2021-09-14 PROCEDURE — 2500000003 HC RX 250 WO HCPCS: Performed by: NURSE ANESTHETIST, CERTIFIED REGISTERED

## 2021-09-14 PROCEDURE — 83036 HEMOGLOBIN GLYCOSYLATED A1C: CPT

## 2021-09-14 PROCEDURE — 2500000003 HC RX 250 WO HCPCS: Performed by: ANESTHESIOLOGY

## 2021-09-14 PROCEDURE — 27236 TREAT THIGH FRACTURE: CPT | Performed by: ORTHOPAEDIC SURGERY

## 2021-09-14 PROCEDURE — 82306 VITAMIN D 25 HYDROXY: CPT

## 2021-09-14 PROCEDURE — 2500000003 HC RX 250 WO HCPCS: Performed by: ORTHOPAEDIC SURGERY

## 2021-09-14 PROCEDURE — C1776 JOINT DEVICE (IMPLANTABLE): HCPCS | Performed by: ORTHOPAEDIC SURGERY

## 2021-09-14 PROCEDURE — 80053 COMPREHEN METABOLIC PANEL: CPT

## 2021-09-14 PROCEDURE — 2700000000 HC OXYGEN THERAPY PER DAY

## 2021-09-14 PROCEDURE — 3600000004 HC SURGERY LEVEL 4 BASE: Performed by: ORTHOPAEDIC SURGERY

## 2021-09-14 PROCEDURE — 94761 N-INVAS EAR/PLS OXIMETRY MLT: CPT

## 2021-09-14 PROCEDURE — 93306 TTE W/DOPPLER COMPLETE: CPT

## 2021-09-14 PROCEDURE — 1200000000 HC SEMI PRIVATE

## 2021-09-14 PROCEDURE — 0SRR0JA REPLACEMENT OF RIGHT HIP JOINT, FEMORAL SURFACE WITH SYNTHETIC SUBSTITUTE, UNCEMENTED, OPEN APPROACH: ICD-10-PCS | Performed by: ORTHOPAEDIC SURGERY

## 2021-09-14 PROCEDURE — 82962 GLUCOSE BLOOD TEST: CPT

## 2021-09-14 PROCEDURE — 3700000000 HC ANESTHESIA ATTENDED CARE: Performed by: ORTHOPAEDIC SURGERY

## 2021-09-14 PROCEDURE — 36415 COLL VENOUS BLD VENIPUNCTURE: CPT

## 2021-09-14 PROCEDURE — 27236 TREAT THIGH FRACTURE: CPT | Performed by: PHYSICIAN ASSISTANT

## 2021-09-14 PROCEDURE — 2709999900 HC NON-CHARGEABLE SUPPLY: Performed by: ORTHOPAEDIC SURGERY

## 2021-09-14 PROCEDURE — 93005 ELECTROCARDIOGRAM TRACING: CPT | Performed by: INTERNAL MEDICINE

## 2021-09-14 PROCEDURE — 3600000014 HC SURGERY LEVEL 4 ADDTL 15MIN: Performed by: ORTHOPAEDIC SURGERY

## 2021-09-14 PROCEDURE — 2580000003 HC RX 258: Performed by: NURSE PRACTITIONER

## 2021-09-14 PROCEDURE — 7100000001 HC PACU RECOVERY - ADDTL 15 MIN: Performed by: ORTHOPAEDIC SURGERY

## 2021-09-14 PROCEDURE — 3700000001 HC ADD 15 MINUTES (ANESTHESIA): Performed by: ORTHOPAEDIC SURGERY

## 2021-09-14 PROCEDURE — 82248 BILIRUBIN DIRECT: CPT

## 2021-09-14 PROCEDURE — 85610 PROTHROMBIN TIME: CPT

## 2021-09-14 RX ORDER — SENNA AND DOCUSATE SODIUM 50; 8.6 MG/1; MG/1
1 TABLET, FILM COATED ORAL 2 TIMES DAILY
Status: DISCONTINUED | OUTPATIENT
Start: 2021-09-14 | End: 2021-09-17 | Stop reason: HOSPADM

## 2021-09-14 RX ORDER — ACETAMINOPHEN 500 MG
1000 TABLET ORAL ONCE
Status: DISCONTINUED | OUTPATIENT
Start: 2021-09-14 | End: 2021-09-14 | Stop reason: HOSPADM

## 2021-09-14 RX ORDER — PHENYLEPHRINE HCL IN 0.9% NACL 1 MG/10 ML
SYRINGE (ML) INTRAVENOUS PRN
Status: DISCONTINUED | OUTPATIENT
Start: 2021-09-14 | End: 2021-09-14 | Stop reason: SDUPTHER

## 2021-09-14 RX ORDER — SODIUM CHLORIDE 0.9 % (FLUSH) 0.9 %
10 SYRINGE (ML) INJECTION EVERY 12 HOURS SCHEDULED
Status: DISCONTINUED | OUTPATIENT
Start: 2021-09-14 | End: 2021-09-14 | Stop reason: HOSPADM

## 2021-09-14 RX ORDER — SODIUM CHLORIDE 0.9 % (FLUSH) 0.9 %
10 SYRINGE (ML) INJECTION PRN
Status: DISCONTINUED | OUTPATIENT
Start: 2021-09-14 | End: 2021-09-14 | Stop reason: HOSPADM

## 2021-09-14 RX ORDER — SODIUM CHLORIDE 9 MG/ML
25 INJECTION, SOLUTION INTRAVENOUS PRN
Status: DISCONTINUED | OUTPATIENT
Start: 2021-09-14 | End: 2021-09-14 | Stop reason: HOSPADM

## 2021-09-14 RX ORDER — DEXTROSE MONOHYDRATE 25 G/50ML
12.5 INJECTION, SOLUTION INTRAVENOUS PRN
Status: DISCONTINUED | OUTPATIENT
Start: 2021-09-14 | End: 2021-09-17 | Stop reason: HOSPADM

## 2021-09-14 RX ORDER — ACETAMINOPHEN 325 MG/1
650 TABLET ORAL EVERY 4 HOURS PRN
Status: DISCONTINUED | OUTPATIENT
Start: 2021-09-14 | End: 2021-09-17 | Stop reason: HOSPADM

## 2021-09-14 RX ORDER — ROCURONIUM BROMIDE 10 MG/ML
INJECTION, SOLUTION INTRAVENOUS PRN
Status: DISCONTINUED | OUTPATIENT
Start: 2021-09-14 | End: 2021-09-14 | Stop reason: SDUPTHER

## 2021-09-14 RX ORDER — SODIUM CHLORIDE, SODIUM LACTATE, POTASSIUM CHLORIDE, CALCIUM CHLORIDE 600; 310; 30; 20 MG/100ML; MG/100ML; MG/100ML; MG/100ML
INJECTION, SOLUTION INTRAVENOUS CONTINUOUS
Status: DISCONTINUED | OUTPATIENT
Start: 2021-09-14 | End: 2021-09-15

## 2021-09-14 RX ORDER — KETAMINE HCL 50MG/ML(1)
SYRINGE (ML) INTRAVENOUS PRN
Status: DISCONTINUED | OUTPATIENT
Start: 2021-09-14 | End: 2021-09-14 | Stop reason: SDUPTHER

## 2021-09-14 RX ORDER — ALBUMIN, HUMAN INJ 5% 5 %
SOLUTION INTRAVENOUS PRN
Status: DISCONTINUED | OUTPATIENT
Start: 2021-09-14 | End: 2021-09-14 | Stop reason: SDUPTHER

## 2021-09-14 RX ORDER — SODIUM CHLORIDE 0.9 % (FLUSH) 0.9 %
10 SYRINGE (ML) INJECTION PRN
Status: DISCONTINUED | OUTPATIENT
Start: 2021-09-14 | End: 2021-09-17 | Stop reason: HOSPADM

## 2021-09-14 RX ORDER — CEFAZOLIN SODIUM 1 G/50ML
1000 INJECTION, SOLUTION INTRAVENOUS EVERY 8 HOURS
Status: COMPLETED | OUTPATIENT
Start: 2021-09-14 | End: 2021-09-15

## 2021-09-14 RX ORDER — SODIUM CHLORIDE, SODIUM LACTATE, POTASSIUM CHLORIDE, CALCIUM CHLORIDE 600; 310; 30; 20 MG/100ML; MG/100ML; MG/100ML; MG/100ML
INJECTION, SOLUTION INTRAVENOUS CONTINUOUS
Status: DISCONTINUED | OUTPATIENT
Start: 2021-09-14 | End: 2021-09-14

## 2021-09-14 RX ORDER — SODIUM CHLORIDE 0.9 % (FLUSH) 0.9 %
10 SYRINGE (ML) INJECTION EVERY 12 HOURS SCHEDULED
Status: DISCONTINUED | OUTPATIENT
Start: 2021-09-14 | End: 2021-09-17 | Stop reason: HOSPADM

## 2021-09-14 RX ORDER — PROPOFOL 10 MG/ML
INJECTION, EMULSION INTRAVENOUS PRN
Status: DISCONTINUED | OUTPATIENT
Start: 2021-09-14 | End: 2021-09-14 | Stop reason: SDUPTHER

## 2021-09-14 RX ORDER — ETOMIDATE 2 MG/ML
INJECTION INTRAVENOUS PRN
Status: DISCONTINUED | OUTPATIENT
Start: 2021-09-14 | End: 2021-09-14 | Stop reason: SDUPTHER

## 2021-09-14 RX ORDER — TRANEXAMIC ACID 100 MG/ML
INJECTION, SOLUTION INTRAVENOUS
Status: COMPLETED | OUTPATIENT
Start: 2021-09-14 | End: 2021-09-14

## 2021-09-14 RX ORDER — SODIUM CHLORIDE 9 MG/ML
25 INJECTION, SOLUTION INTRAVENOUS PRN
Status: DISCONTINUED | OUTPATIENT
Start: 2021-09-14 | End: 2021-09-17 | Stop reason: HOSPADM

## 2021-09-14 RX ORDER — LIDOCAINE HYDROCHLORIDE 20 MG/ML
INJECTION, SOLUTION INTRAVENOUS PRN
Status: DISCONTINUED | OUTPATIENT
Start: 2021-09-14 | End: 2021-09-14 | Stop reason: SDUPTHER

## 2021-09-14 RX ADMIN — SERTRALINE HYDROCHLORIDE 50 MG: 50 TABLET ORAL at 10:25

## 2021-09-14 RX ADMIN — PROPOFOL 50 MG: 10 INJECTION, EMULSION INTRAVENOUS at 15:16

## 2021-09-14 RX ADMIN — ACETAMINOPHEN 650 MG: 325 TABLET ORAL at 07:23

## 2021-09-14 RX ADMIN — ACETAMINOPHEN 650 MG: 325 TABLET ORAL at 18:41

## 2021-09-14 RX ADMIN — SODIUM CHLORIDE, POTASSIUM CHLORIDE, SODIUM LACTATE AND CALCIUM CHLORIDE: 600; 310; 30; 20 INJECTION, SOLUTION INTRAVENOUS at 15:16

## 2021-09-14 RX ADMIN — CEFAZOLIN 1000 MG: 1 INJECTION, POWDER, FOR SOLUTION INTRAMUSCULAR; INTRAVENOUS; PARENTERAL at 15:19

## 2021-09-14 RX ADMIN — LIDOCAINE HYDROCHLORIDE 50 MG: 20 INJECTION, SOLUTION INTRAVENOUS at 15:16

## 2021-09-14 RX ADMIN — ETOMIDATE 4 MG: 2 INJECTION, SOLUTION INTRAVENOUS at 15:16

## 2021-09-14 RX ADMIN — BUSPIRONE HYDROCHLORIDE 5 MG: 5 TABLET ORAL at 22:57

## 2021-09-14 RX ADMIN — SUGAMMADEX 100 MG: 100 INJECTION, SOLUTION INTRAVENOUS at 16:22

## 2021-09-14 RX ADMIN — DEXTROSE MONOHYDRATE 12.5 G: 25 INJECTION, SOLUTION INTRAVENOUS at 16:58

## 2021-09-14 RX ADMIN — PANTOPRAZOLE SODIUM 40 MG: 40 TABLET, DELAYED RELEASE ORAL at 07:23

## 2021-09-14 RX ADMIN — BISACODYL 5 MG: 5 TABLET, COATED ORAL at 10:25

## 2021-09-14 RX ADMIN — SODIUM CHLORIDE, PRESERVATIVE FREE 10 ML: 5 INJECTION INTRAVENOUS at 10:25

## 2021-09-14 RX ADMIN — SODIUM CHLORIDE, PRESERVATIVE FREE 10 ML: 5 INJECTION INTRAVENOUS at 22:58

## 2021-09-14 RX ADMIN — Medication 100 MCG: at 16:14

## 2021-09-14 RX ADMIN — ASPIRIN 325 MG: 325 TABLET, COATED ORAL at 22:57

## 2021-09-14 RX ADMIN — SENNOSIDES AND DOCUSATE SODIUM 1 TABLET: 50; 8.6 TABLET ORAL at 22:57

## 2021-09-14 RX ADMIN — Medication 5 MG: at 22:57

## 2021-09-14 RX ADMIN — Medication 25 MG: at 15:16

## 2021-09-14 RX ADMIN — LEVOTHYROXINE SODIUM 75 MCG: 0.07 TABLET ORAL at 07:23

## 2021-09-14 RX ADMIN — BUSPIRONE HYDROCHLORIDE 5 MG: 5 TABLET ORAL at 10:25

## 2021-09-14 RX ADMIN — ACETAMINOPHEN 650 MG: 325 TABLET ORAL at 00:06

## 2021-09-14 RX ADMIN — ROCURONIUM BROMIDE 30 MG: 10 INJECTION INTRAVENOUS at 15:16

## 2021-09-14 RX ADMIN — Medication 5000 UNITS: at 10:25

## 2021-09-14 RX ADMIN — SODIUM CHLORIDE, POTASSIUM CHLORIDE, SODIUM LACTATE AND CALCIUM CHLORIDE: 600; 310; 30; 20 INJECTION, SOLUTION INTRAVENOUS at 17:27

## 2021-09-14 RX ADMIN — ALBUMIN (HUMAN) 12.5 G: 12.5 INJECTION, SOLUTION INTRAVENOUS at 15:21

## 2021-09-14 RX ADMIN — TRAMADOL HYDROCHLORIDE 50 MG: 50 TABLET, FILM COATED ORAL at 22:57

## 2021-09-14 RX ADMIN — SUGAMMADEX 100 MG: 100 INJECTION, SOLUTION INTRAVENOUS at 16:24

## 2021-09-14 ASSESSMENT — PULMONARY FUNCTION TESTS
PIF_VALUE: 16
PIF_VALUE: 15
PIF_VALUE: 16
PIF_VALUE: 15
PIF_VALUE: 4
PIF_VALUE: 16
PIF_VALUE: 16
PIF_VALUE: 4
PIF_VALUE: 4
PIF_VALUE: 30
PIF_VALUE: 16
PIF_VALUE: 16
PIF_VALUE: 0
PIF_VALUE: 14
PIF_VALUE: 16
PIF_VALUE: 15
PIF_VALUE: 16
PIF_VALUE: 4
PIF_VALUE: 1
PIF_VALUE: 3
PIF_VALUE: 15
PIF_VALUE: 9
PIF_VALUE: 16
PIF_VALUE: 1
PIF_VALUE: 17
PIF_VALUE: 16
PIF_VALUE: 16
PIF_VALUE: 2
PIF_VALUE: 15
PIF_VALUE: 16
PIF_VALUE: 16
PIF_VALUE: 7
PIF_VALUE: 16
PIF_VALUE: 24
PIF_VALUE: 16
PIF_VALUE: 14
PIF_VALUE: 16
PIF_VALUE: 19
PIF_VALUE: 16
PIF_VALUE: 17
PIF_VALUE: 3
PIF_VALUE: 1
PIF_VALUE: 16
PIF_VALUE: 2
PIF_VALUE: 0
PIF_VALUE: 15
PIF_VALUE: 16
PIF_VALUE: 16
PIF_VALUE: 14
PIF_VALUE: 16
PIF_VALUE: 15
PIF_VALUE: 15
PIF_VALUE: 16
PIF_VALUE: 15
PIF_VALUE: 16
PIF_VALUE: 17
PIF_VALUE: 14

## 2021-09-14 ASSESSMENT — PAIN SCALES - GENERAL
PAINLEVEL_OUTOF10: 6
PAINLEVEL_OUTOF10: 2
PAINLEVEL_OUTOF10: 0
PAINLEVEL_OUTOF10: 6
PAINLEVEL_OUTOF10: 5
PAINLEVEL_OUTOF10: 5
PAINLEVEL_OUTOF10: 7
PAINLEVEL_OUTOF10: 0

## 2021-09-14 ASSESSMENT — PAIN DESCRIPTION - LOCATION
LOCATION: HIP

## 2021-09-14 ASSESSMENT — PAIN DESCRIPTION - ORIENTATION
ORIENTATION: RIGHT

## 2021-09-14 ASSESSMENT — PAIN DESCRIPTION - PROGRESSION: CLINICAL_PROGRESSION: GRADUALLY IMPROVING

## 2021-09-14 ASSESSMENT — PAIN DESCRIPTION - PAIN TYPE
TYPE: SURGICAL PAIN
TYPE: SURGICAL PAIN

## 2021-09-14 ASSESSMENT — PAIN DESCRIPTION - DESCRIPTORS
DESCRIPTORS: ACHING

## 2021-09-14 ASSESSMENT — PAIN DESCRIPTION - ONSET
ONSET: ON-GOING
ONSET: ON-GOING

## 2021-09-14 ASSESSMENT — PAIN - FUNCTIONAL ASSESSMENT: PAIN_FUNCTIONAL_ASSESSMENT: PREVENTS OR INTERFERES SOME ACTIVE ACTIVITIES AND ADLS

## 2021-09-14 ASSESSMENT — PAIN DESCRIPTION - FREQUENCY
FREQUENCY: CONTINUOUS
FREQUENCY: CONTINUOUS

## 2021-09-14 NOTE — BRIEF OP NOTE
Brief Postoperative Note      Patient: Linda Moses  YOB: 1931  MRN: [id number]    Date of Procedure: 9/14/2021    Pre-Op Diagnosis: -    Post-Op Diagnosis: Same       Procedure(s):  RIGHT HIP HEMIARTHROPLASTY    Surgeon(s):  Lamar Evans DO    Assistant:  Physician Assistant: Vincent Guerra PA-C    Anesthesia: General    Estimated Blood Loss (mL): 362    Complications: None    Specimens:   * No specimens in log *    Implants:  Implant Name Type Inv.  Item Serial No.  Lot No. LRB No. Used Action   Avenir Complete Standard Collared Size 6.5  Avenir Complete Standard Collared Size 6.5  [employer] [id number] Right 1 Implanted   BIPOLAR LINER 44/45/46MM OD X 28MM ID  BIPOLAR LINER 44/45/46MM OD X 28MM ID  [employer] [id number] Right 1 Implanted   BIPOLAR SHELL 46MM OD  BIPOLAR SHELL 46MM OD  [employer] [id number] Right 1 Implanted          1 Implanted         Drains: * No LDAs found *    Findings: R hip fx    Electronically signed by Bridgette Saez DO on 9/14/2021 at 4:11 PM

## 2021-09-14 NOTE — PROGRESS NOTES
Patient resting at this time. Complains of right hip pain only when moving. Pt remains NPO. Pt awakens easily but is confused. Left forearm IV is intact and flushes well. Using the bedpan to void while being on bedrest. Call light in reach. Pt denies any needs currently. Will continue to monitor.

## 2021-09-14 NOTE — PROGRESS NOTES
Hospitalist Progress Note      Name:  Rossy Gloria /Age/Sex: 1931  (80 y.o. female)   MRN & CSN:  [id number] & [id number] Admission Date/Time: 2021  8:17 AM   [facility] PCP: No primary care provider on file. Hospital Day: 2    Assessment and Plan:   Rossy Gloria is a 80 y.o.  female  who presents with right hip fracture and hematoma. Closed right hip fracture   Right hip hematoma  -Admit CT with fractured subcapital right femoral neck with adjacent 6 cm superficial soft tissue hematoma  -Given history of severe valvular disease and CHF, cardiology consulted for preoperative evaluation  -If cleared for surgery, Dr. Amara Fox for surgery 21  -Keep n.p.o.  -Continue scheduled Tylenol, as needed Ultram    History of DVT  -Remote history  -On Xarelto, but held in setting of hematoma  -Hemoglobin has remained relatively stable  -Monitor H&H; resume Xarelto cautiously, the need to consider holding indefinitely as fall risk in setting of dementia  -Bilateral lower extremity edema noted, lower extremity Doppler pending    Cachexia  Malnutrition  -Admit BMI 16  -Consult dietitian    Osteoporosis  -Recommend repeat bone density scan as outpatient  -Continue home vitamin D    Fall  History of dementia  -Alert and oriented to self only  -On fall precautions and strict bedrest  -PT/OT following surgery    T2DM  -Hemoglobin A1c 5.3  -Monitor blood glucose      This patient was seen and examined autonomously  A hospitalist attending physician was available for questions/consultation as needed.        Diet Diet NPO   DVT Prophylaxis [x] Lovenox, []  Heparin, [] SCDs, [] Ambulation   GI Prophylaxis [] PPI,  [] H2 Blocker,  [] Carafate,  [x] Diet/Tube Feeds   Code Status Full Code   Disposition Patient requires continued admission due to right hip fracture requiring surgery         History of Present Illness:     Chief Complaint: Rossy Gloria is a 80 y.o.  female  who presents with right hip pain. Patient seen and examined at bedside. She is alert and oriented to her name only. Does not know her birthdate, the year or where she is. She has not been able to provide any history in regards to her fall. She complains of minimal pain over her right hip. She is calm without any complaints. Ten point ROS reviewed negative, unless as noted above    Objective:   No intake or output data in the 24 hours ending 09/14/21 1200   Vitals:   Vitals:    09/14/21 0210   BP: 125/71   Pulse: 93   Resp: 16   Temp: 98.7 °F (37.1 °C)   SpO2: 94%     Physical Exam:     GEN Awake female, sitting upright in bed in no apparent distress. Appears given age. Cachectic  EYES Pupils are equally round. No scleral erythema, discharge, or conjunctivitis. HENT Mucous membranes are moist.  NECK Supple, no apparent thyromegaly or masses. RESP Clear to auscultation, no wheezes, rales or rhonchi. Respirations even and unlabored on RA. CARDIO/VASC   S1/S2 auscultated. Regular rate without appreciable murmurs, rubs, or gallops. Peripheral pulses equal bilaterally and palpable. No peripheral edema. GI Abdomen is soft without significant tenderness, masses, or guarding. Bowel sounds are normoactive.  No costovertebral angle tenderness. Rosales catheter is not present. MSK No gross joint deformities. Right leg shortened and internally rotated. Small hematoma of right hip, no tenderness to palpation. SKIN Normal coloration, warm, dry. NEURO Cranial nerves appear grossly intact, normal speech, no lateralizing weakness. PSYCH Awake, alert, oriented x 4. Affect appropriate.     Medications:   Medications:    lidocaine  5 mL IntraDERmal Once    sodium chloride flush  5-40 mL IntraVENous 2 times per day    enoxaparin  40 mg SubCUTAneous Daily    acetaminophen  650 mg Oral Q6H    bisacodyl  5 mg Oral Daily    melatonin  5 mg Oral Nightly    sertraline  50 mg Oral Daily    levothyroxine  75 mcg Oral Daily    vitamin D  5,000 Units Oral Daily    busPIRone  5 mg Oral BID    pantoprazole  40 mg Oral QAM AC      Infusions:    sodium chloride       PRN Meds: sodium chloride flush, 5-40 mL, PRN  sodium chloride, 25 mL, PRN  ondansetron, 4 mg, Q8H PRN   Or  ondansetron, 4 mg, Q6H PRN  polyethylene glycol, 17 g, Daily PRN  traMADol, 50 mg, Q6H PRN   Or  traMADol, 75 mg, Q6H PRN          Electronically signed by Nii Arroyo PA-C on 9/14/2021 at 12:00 PM

## 2021-09-14 NOTE — CARE COORDINATION
CM called [facility]ney . Pt is a LTC in the dementia unit. She may return whenever medically cleared. Pt will need a Neg rapid covid prior to discharge. CM called Irma Cash. legal POA of health and finances. Rajendra Maki voiced concerns that pt would be going back to [facility]. She is concerned that pt would need more care than what could be provided to her. CM called Donna Yakendal at [facility] and informed that pt would be going back to [facility] with therapy seeing her. CM updated Rajendra Gambino who voiced disappointment. CM encouraged her to call [employer] and discuss. CM provided her CM contact information to call for any needs. Upon discharge pt will be going to LTC  at [address] [facility]   Notify Family  Please fax H/P, D/S. Scripts, AVS, and Completed LOLIS to [phone]  Please call report to [phone]  Please call pts choice for transportation. [facility] [phone] or [phone]  Pt will need a Rapid covid prior to discharge. Pt may go whenever medically cleared.

## 2021-09-14 NOTE — PROGRESS NOTES
Comprehensive Nutrition Assessment    Type and Reason for Visit:  Initial (Low BMI for age, C/s Poor Appetite/Intake Over 5 Days)    Nutrition Recommendations/Plan:   · Advance diet as medically able  · Recommend regular diet     Nutrition Assessment:  Admits for hip fracture s/p fall, pt with advanced Alzheimer's dementia. PMH: HLD, HTN, iron deficiency. Pt currently NPO for possible surgery. 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 \" Resides [address] [facility]. Eats small meals, stable weight over 1 yr. Agreed to supplements and high protein snacks (yogurt, soups, ensures, magic cups) once diet is advanced. Performed NFPE. Pt meets criteria for chronic severe malnutrition r/t advanced age and chronic illness. High nutrition risk. Malnutrition Assessment:  Malnutrition Status:  Severe malnutrition    Context:  Chronic Illness     Findings of the 6 clinical characteristics of malnutrition:  Energy Intake:  7 - 75% or less estimated energy requirements for 1 month or longer  Weight Loss:  No significant weight loss     Body Fat Loss:  7 - Severe body fat loss Orbital, Triceps   Muscle Mass Loss:  7 - Severe muscle mass loss Temples (temporalis), Clavicles (pectoralis & deltoids), Hand (interosseous), Scapula (trapezius), Thigh (quadraceps)  Fluid Accumulation:  No significant fluid accumulation Extremities   Strength:  Not Performed    Estimated Daily Nutrient Needs:  Energy (kcal):  3604-0354 (30-35 kcals/kg); Weight Used for Energy Requirements:  Current     Protein (g):  57-68 (1-1.2 g/kg IBW);  Weight Used for Protein Requirements:  Ideal        Fluid (ml/day):  1500; Method Used for Fluid Requirements:  1 ml/kcal      Nutrition Related Findings:  Complains of arm and hip pain r/t recent fall, some ab pain      Wounds:  None       Current Nutrition Therapies:    Diet NPO    Anthropometric Measures:  · Height: 5' 5\" (165.1 cm)  · Current Body Weight: 98 lb 5.2 oz (44.6 kg) · Admission Body Weight: 98 lb 5.2 oz (44.6 kg)    · Usual Body Weight: 93 lb 3.2 oz (42.3 kg) (9/14/20)     · Ideal Body Weight: 125 lbs; % Ideal Body Weight 78.7 %   · BMI: 16.4  · BMI Categories: Underweight (BMI less than 22) age over 72       Nutrition Diagnosis:   · Severe malnutrition, In context of chronic illness related to inadequate protein-energy intake, catabolic illness, cognitive or neurological impairment (reduced appetite/intake) as evidenced by poor intake prior to admission, severe loss of subcutaneous fat, severe muscle loss    Nutrition Interventions:   Food and/or Nutrient Delivery:  Continue NPO, Start Oral Diet (Please add Greek yogurt once able)  Nutrition Education/Counseling:  No recommendation at this time   Coordination of Nutrition Care:  Continue to monitor while inpatient, Coordination of Community Care    Goals:  Pt will have nutrition source by NPO day 5. Nutrition Monitoring and Evaluation:   Behavioral-Environmental Outcomes:  None Identified   Food/Nutrient Intake Outcomes:  Diet Advancement/Tolerance  Physical Signs/Symptoms Outcomes:  Nutrition Focused Physical Findings, Biochemical Data, Weight, GI Status     Discharge Planning:     Too soon to determine     Electronically signed by Wilian Ayala RD, LD on 9/14/21 at 12:00 PM EDT    [facility] Center[id number]

## 2021-09-14 NOTE — CONSULTS
CARDIOLOGY CONSULT NOTE   Reason for consultation:  Preoperative risk    Referring physician:  Jorge Schulz MD     Primary care physician: No primary care provider on file. Dear  Dr. Jorge Schulz MD   Thanks for the consult. Chief Complaints :  Chief Complaint   Patient presents with    Hip Pain        History of present illness:Helen is a 80 y. o.year old who presents for fall and is found to have left hip fracture she is planned for hip surgery cardiology asked to evaluate her for preoperative risk assessment patient is poor historian due to advanced dementia but is complaining of left hip pain and crying. Urgent bedside echo shows severe mitral stenosis with moderate severe aortic stenosis although clinically she does not appear to be volume overloaded she is able to lie flat chest x-ray is clear  She is on Xarelto    Past medical history:    has a past medical history of Aortic valvular disease, Asthma, B12 deficiency, COPD (chronic obstructive pulmonary disease) ([address] Dementia ([address] DVT (deep vein thrombosis) in pregnancy, H/O Doppler ABD Aortia ultrasound, H/O Doppler lower venous ultrasound, H/O echocardiogram, Hyperlipidemia, Hypertension, Hypothyroidism, Iron deficiency, Osteoarthritis, Peripheral neuropathy, Pulmonary nodule, Seasonal allergies, Spinal stenosis, and Type 2 diabetes mellitus without complication ([address] Past surgical history:   has a past surgical history that includes Appendectomy; eye surgery; ольга and bso (cervix removed) (1971); knee surgery (Right, 1990); lymphadenectomy (6078); Nose surgery; Tonsillectomy and adenoidectomy; Breast biopsy; Rotator cuff repair (Left, 2005); Dental surgery (1963); and Thoracic disc surgery (1976). Social History:   reports that she has quit smoking. She has never used smokeless tobacco. She reports that she does not drink alcohol and does not use drugs.   Family history:   no family history of CAD, STROKE of DM at early age    Allergies Allergen Reactions    Iodine Shortness Of Breath    Bee Pollen Swelling    Tetanus Toxoids Itching    Penicillins Rash       lidocaine 1 % injection 5 mL, Once  sodium chloride flush 0.9 % injection 5-40 mL, 2 times per day  sodium chloride flush 0.9 % injection 5-40 mL, PRN  0.9 % sodium chloride infusion, PRN  enoxaparin (LOVENOX) injection 40 mg, Daily  ondansetron (ZOFRAN-ODT) disintegrating tablet 4 mg, Q8H PRN   Or  ondansetron (ZOFRAN) injection 4 mg, Q6H PRN  polyethylene glycol (GLYCOLAX) packet 17 g, Daily PRN  acetaminophen (TYLENOL) tablet 650 mg, Q6H  traMADol (ULTRAM) tablet 50 mg, Q6H PRN   Or  traMADol (ULTRAM) tablet 75 mg, Q6H PRN  bisacodyl (DULCOLAX) EC tablet 5 mg, Daily  melatonin tablet 5 mg, Nightly  sertraline (ZOLOFT) tablet 50 mg, Daily  levothyroxine (SYNTHROID) tablet 75 mcg, Daily  vitamin D CAPS 5,000 Units, Daily  busPIRone (BUSPAR) tablet 5 mg, BID  pantoprazole (PROTONIX) tablet 40 mg, QAM AC      Current Facility-Administered Medications   Medication Dose Route Frequency Provider Last Rate Last Admin    lidocaine 1 % injection 5 mL  5 mL IntraDERmal Once TOBY Arechiga CNP        sodium chloride flush 0.9 % injection 5-40 mL  5-40 mL IntraVENous 2 times per day TOBY Barber - CNP   10 mL at 09/14/21 1025    sodium chloride flush 0.9 % injection 5-40 mL  5-40 mL IntraVENous PRN TOBY Arechiga - CNP        0.9 % sodium chloride infusion  25 mL IntraVENous PRN TOBY Arechiga CNP        enoxaparin (LOVENOX) injection 40 mg  40 mg SubCUTAneous Daily TOBY Arechiga - CNP   40 mg at 09/13/21 1920    ondansetron (ZOFRAN-ODT) disintegrating tablet 4 mg  4 mg Oral Q8H PRN TOBY Arechiga CNP        Or    ondansetron (ZOFRAN) injection 4 mg  4 mg IntraVENous Q6H PRN Leeann I Aric, APRN - CNP        polyethylene glycol (GLYCOLAX) packet 17 g  17 g Oral Daily PRN Krista Rivera, APRN - CNP Respiratory:  Normal breath sounds, No respiratory distress, No wheezing, No chest tenderness. ,no use of accessory muscles, crackles Present  Cardiovascular: (PMI) apex non displaced,no lifts no thrills, ankle swelling Present , 1+, s1 and s2 audible,Murmur. Present, JVD not noted    Abdomen /GI:  Bowel sounds normal, Soft, No tenderness, No masses, No gross visceromegaly   :  No costovertebral angle tenderness   Musculoskeletal:  No edema, no tenderness, no deformities. Back- no tenderness  Integument:  Well hydrated, no rash   Lymphatic:  No lymphadenopathy noted   Neurologic: Confused does not follow commands          Medical decision making and Data review:    Lab Review   Recent Labs     09/14/21 0526   WBC 9.6   HGB 10.0*   HCT 33.0*         Recent Labs     09/14/21 0526      K 3.9   CL 99   CO2 28   BUN 29*   CREATININE 0.9     Recent Labs     09/14/21 0526   AST 27   ALT 14   BILIDIR 0.2   BILITOT 0.6   ALKPHOS 84     No results for input(s): TROPONINT in the last 72 hours. No results for input(s): PROBNP in the last 72 hours. Lab Results   Component Value Date    INR 1.15 09/14/2021    PROTIME 14.9 (H) 09/14/2021       EKG: (reviewed by myself)    ECHO:(reviewed by myself)    Chest Xray:(reviewed by myself)  CT ABDOMEN PELVIS WO CONTRAST Additional Contrast? None    Result Date: 9/13/2021  EXAMINATION: CT OF THE THORACIC SPINE WITHOUT CONTRAST; CT OF THE ABDOMEN AND PELVIS WITHOUT CONTRAST; CT OF THE CHEST WITHOUT CONTRAST; CT OF THE LUMBAR SPINE WITHOUT CONTRAST, 9/13/2021 10:06 am; 9/13/2021 10:17 am; 9/13/2021 10:05 am TECHNIQUE: CT of the thoracic spine was performed without the administration of intravenous contrast. Multiplanar reformatted images are provided for review.  Dose modulation, iterative reconstruction, and/or weight based adjustment of the mA/kV was utilized to reduce the radiation dose to as low as reasonably achievable.; CT of the abdomen and pelvis was performed without the administration of intravenous contrast. Multiplanar reformatted images are provided for review. Dose modulation, iterative reconstruction, and/or weight based adjustment of the mA/kV was utilized to reduce the radiation dose to as low as reasonably achievable.; CT of the chest was performed without the administration of intravenous contrast. Multiplanar reformatted images are provided for review. Dose modulation, iterative reconstruction, and/or weight based adjustment of the mA/kV was utilized to reduce the radiation dose to as low as reasonably achievable.; CT of the lumbar spine was performed without the administration of intravenous contrast. Multiplanar reformatted images are provided for review. Adjustment of mA and/or kV according to patient size was utilized. Dose modulation, iterative reconstruction, and/or weight based adjustment of the mA/kV was utilized to reduce the radiation dose to as low as reasonably achievable. COMPARISON: 04/04/2021 HISTORY: ORDERING SYSTEM PROVIDED HISTORY: trauma TECHNOLOGIST PROVIDED HISTORY: Reason for exam:->trauma Decision Support Exception - unselect if not a suspected or confirmed emergency medical condition->Emergency Medical Condition (MA) Reason for Exam: fall Acuity: Acute Type of Exam: Initial Mechanism of Injury: fall Relevant Medical/Surgical History: unknown; ORDERING SYSTEM PROVIDED HISTORY: trauma TECHNOLOGIST PROVIDED HISTORY: Reason for exam:->trauma Reason for Exam: fall Acuity: Acute Type of Exam: Initial Mechanism of Injury: fall Relevant Medical/Surgical History: unknown; ORDERING SYSTEM PROVIDED HISTORY: trauma TECHNOLOGIST PROVIDED HISTORY: Reason for exam:->trauma Additional Contrast?->None Reason for Exam: fall Acuity: Acute Type of Exam: Initial Mechanism of Injury: fall Relevant Medical/Surgical History: unknown FINDINGS: CT CHEST: No thoracic adenopathy. Heart size is normal.  No pericardial effusion. Thoracic aorta is normal caliber. Severe emphysema. No acute lung or pleural injury. No suspicious pulmonary nodule. The central airways are clear. No acute rib fracture. CT ABDOMEN: The unenhanced liver, spleen, pancreas, gallbladder, adrenal glands, and kidneys demonstrate no acute abnormality. No acute bowel or mesenteric injury on the noncontrast CT. Sigmoid diverticulosis. No hemoperitoneum or pneumoperitoneum. No abdominal aortic aneurysm. No adenopathy. CT PELVIS: No pelvic hematoma or free fluid. Distended urinary bladder. An acute traumatic subcapital right femoral neck fracture is present. No additional fracture is present in the pelvis. In the soft tissues superficial to the right greater trochanter, there is a soft tissue hematoma measuring 6 x 6 x 3 cm with surrounding bruising and soft tissue edema. No intramuscular hematoma. THORACIC/LUMBAR SPINE: No acute fracture in the thoracic spine. No lytic or destructive lesion. No spondylolisthesis. Lumbar vertebral body heights are maintained. No acute fracture in the lumbar spine. No traumatic spondylolisthesis. Mild degenerative changes. 1. No acute abnormality in the chest. 2. No acute abnormality in the abdomen or pelvis. 3. Fractured subcapital right femoral neck. Adjacent 6 cm superficial soft tissue hematoma. 4. No acute abnormality in the thoracic spine. 5. No acute abnormality in the lumbar spine. XR PELVIS (1-2 VIEWS)    Result Date: 9/13/2021  EXAMINATION: ONE XRAY VIEW OF THE PELVIS 9/13/2021 8:27 am COMPARISON: Radiograph of the pelvis dated 05/23/2021.  HISTORY: ORDERING SYSTEM PROVIDED HISTORY: trauma TECHNOLOGIST PROVIDED HISTORY: Reason for exam:->trauma Reason for Exam: trauma Acuity: Acute Type of Exam: Initial Mechanism of Injury:  pt found by staff sitting in chair, c/o hip pain; pt unable to bare weight; pt with large bruise on rt hip but does not recall falling FINDINGS: There is mildly displaced fracture of the right femoral neck, which appears to involve the subcapital region. There is also additional equivocal cortical irregularity of the greater trochanter. No evidence of dislocation is seen. The bones appear demineralized. Evaluation of the sacrum is limited due to overlying bowel gas. The sacroiliac joints appear grossly symmetric. The pubic symphysis appears congruent. Mild osteoarthritis affects the pubic symphysis. Mildly displaced fracture of the right femoral neck involving the subcapital region with additional equivocal cortical irregularity of the greater trochanter. The bones appear demineralized. XR FEMUR RIGHT (MIN 2 VIEWS)    Result Date: 9/13/2021  EXAMINATION: FOUR XRAY VIEWS OF THE RIGHT KNEE;   XRAY VIEWS OF THE RIGHT FEMUR 9/13/2021 8:27 am COMPARISON: None. HISTORY: ORDERING SYSTEM PROVIDED HISTORY: trauma TECHNOLOGIST PROVIDED HISTORY: Reason for exam:->trauma Reason for Exam: trauma Acuity: Acute Type of Exam: Initial Mechanism of Injury:  pt found by staff sitting in chair, c/o hip pain; pt unable to bare weight; pt with large bruise on rt hip but does not recall falling FINDINGS: Evaluation is partially limited due to inability to optimally position the patient and diffuse bony demineralization. There is fracture of the right femoral neck. Please refer to the report of radiograph of the pelvis obtained on the same day for further evaluation. The shaft of right femur appears intact. Mild osteoarthritis affects the right knee without convincing radiographic evidence of acute fracture or dislocation of the right knee seen. No evidence of joint effusion is seen. Vascular calcification changes are seen. Right femoral neck fracture. Please refer to the report of the radiograph of the pelvis obtained on the same day for further evaluation. The shaft of the right femur and right knee appear intact. The bones appear demineralized.      XR KNEE RIGHT (MIN 4 VIEWS)    Result Date: 9/13/2021  EXAMINATION: FOUR XRAY VIEWS OF THE RIGHT KNEE;   XRAY VIEWS OF THE RIGHT FEMUR 9/13/2021 8:27 am COMPARISON: None. HISTORY: ORDERING SYSTEM PROVIDED HISTORY: trauma TECHNOLOGIST PROVIDED HISTORY: Reason for exam:->trauma Reason for Exam: trauma Acuity: Acute Type of Exam: Initial Mechanism of Injury:  pt found by staff sitting in chair, c/o hip pain; pt unable to bare weight; pt with large bruise on rt hip but does not recall falling FINDINGS: Evaluation is partially limited due to inability to optimally position the patient and diffuse bony demineralization. There is fracture of the right femoral neck. Please refer to the report of radiograph of the pelvis obtained on the same day for further evaluation. The shaft of right femur appears intact. Mild osteoarthritis affects the right knee without convincing radiographic evidence of acute fracture or dislocation of the right knee seen. No evidence of joint effusion is seen. Vascular calcification changes are seen. Right femoral neck fracture. Please refer to the report of the radiograph of the pelvis obtained on the same day for further evaluation. The shaft of the right femur and right knee appear intact. The bones appear demineralized. CT HEAD WO CONTRAST    Result Date: 9/13/2021  EXAMINATION: CT OF THE HEAD WITHOUT CONTRAST  9/13/2021 9:12 am TECHNIQUE: CT of the head was performed without the administration of intravenous contrast. Dose modulation, iterative reconstruction, and/or weight based adjustment of the mA/kV was utilized to reduce the radiation dose to as low as reasonably achievable. COMPARISON: 05/23/2021 HISTORY: ORDERING SYSTEM PROVIDED HISTORY: trauma TECHNOLOGIST PROVIDED HISTORY: Reason for exam:->trauma Has a \"code stroke\" or \"stroke alert\" been called? ->No Decision Support Exception - unselect if not a suspected or confirmed emergency medical condition->Emergency Medical Condition ([place]) Reason for Exam: fall Acuity: Acute Type of Exam: Initial Mechanism of Injury: fall Relevant Medical/Surgical History: unknown FINDINGS: BRAIN/VENTRICLES: There is no acute intracranial hemorrhage, mass effect or midline shift. No abnormal extra-axial fluid collection. The gray-white differentiation is maintained without evidence of an acute infarct. There is no evidence of hydrocephalus. Patchy hypodensities in the periventricular and subcortical white matter, which are nonspecific, but may represent chronic small vessel ischemic change. ORBITS: The visualized portion of the orbits demonstrate no acute abnormality. SINUSES: The visualized paranasal sinuses and mastoid air cells demonstrate no acute abnormality. SOFT TISSUES/SKULL:  No acute abnormality of the visualized skull or soft tissues. No acute intracranial abnormality. CT CHEST WO CONTRAST    Result Date: 9/13/2021  EXAMINATION: CT OF THE THORACIC SPINE WITHOUT CONTRAST; CT OF THE ABDOMEN AND PELVIS WITHOUT CONTRAST; CT OF THE CHEST WITHOUT CONTRAST; CT OF THE LUMBAR SPINE WITHOUT CONTRAST, 9/13/2021 10:06 am; 9/13/2021 10:17 am; 9/13/2021 10:05 am TECHNIQUE: CT of the thoracic spine was performed without the administration of intravenous contrast. Multiplanar reformatted images are provided for review. Dose modulation, iterative reconstruction, and/or weight based adjustment of the mA/kV was utilized to reduce the radiation dose to as low as reasonably achievable.; CT of the abdomen and pelvis was performed without the administration of intravenous contrast. Multiplanar reformatted images are provided for review. Dose modulation, iterative reconstruction, and/or weight based adjustment of the mA/kV was utilized to reduce the radiation dose to as low as reasonably achievable.; CT of the chest was performed without the administration of intravenous contrast. Multiplanar reformatted images are provided for review.  Dose modulation, iterative reconstruction, and/or weight based adjustment of the mA/kV was utilized to reduce the radiation dose to as low as reasonably achievable.; CT of the lumbar spine was performed without the administration of intravenous contrast. Multiplanar reformatted images are provided for review. Adjustment of mA and/or kV according to patient size was utilized. Dose modulation, iterative reconstruction, and/or weight based adjustment of the mA/kV was utilized to reduce the radiation dose to as low as reasonably achievable. COMPARISON: 04/04/2021 HISTORY: ORDERING SYSTEM PROVIDED HISTORY: trauma TECHNOLOGIST PROVIDED HISTORY: Reason for exam:->trauma Decision Support Exception - unselect if not a suspected or confirmed emergency medical condition->Emergency Medical Condition (MA) Reason for Exam: fall Acuity: Acute Type of Exam: Initial Mechanism of Injury: fall Relevant Medical/Surgical History: unknown; ORDERING SYSTEM PROVIDED HISTORY: trauma TECHNOLOGIST PROVIDED HISTORY: Reason for exam:->trauma Reason for Exam: fall Acuity: Acute Type of Exam: Initial Mechanism of Injury: fall Relevant Medical/Surgical History: unknown; ORDERING SYSTEM PROVIDED HISTORY: trauma TECHNOLOGIST PROVIDED HISTORY: Reason for exam:->trauma Additional Contrast?->None Reason for Exam: fall Acuity: Acute Type of Exam: Initial Mechanism of Injury: fall Relevant Medical/Surgical History: unknown FINDINGS: CT CHEST: No thoracic adenopathy. Heart size is normal.  No pericardial effusion. Thoracic aorta is normal caliber. Severe emphysema. No acute lung or pleural injury. No suspicious pulmonary nodule. The central airways are clear. No acute rib fracture. CT ABDOMEN: The unenhanced liver, spleen, pancreas, gallbladder, adrenal glands, and kidneys demonstrate no acute abnormality. No acute bowel or mesenteric injury on the noncontrast CT. Sigmoid diverticulosis. No hemoperitoneum or pneumoperitoneum. No abdominal aortic aneurysm. No adenopathy. CT PELVIS: No pelvic hematoma or free fluid. Distended urinary bladder.  An acute traumatic subcapital right femoral neck fracture is present. No additional fracture is present in the pelvis. In the soft tissues superficial to the right greater trochanter, there is a soft tissue hematoma measuring 6 x 6 x 3 cm with surrounding bruising and soft tissue edema. No intramuscular hematoma. THORACIC/LUMBAR SPINE: No acute fracture in the thoracic spine. No lytic or destructive lesion. No spondylolisthesis. Lumbar vertebral body heights are maintained. No acute fracture in the lumbar spine. No traumatic spondylolisthesis. Mild degenerative changes. 1. No acute abnormality in the chest. 2. No acute abnormality in the abdomen or pelvis. 3. Fractured subcapital right femoral neck. Adjacent 6 cm superficial soft tissue hematoma. 4. No acute abnormality in the thoracic spine. 5. No acute abnormality in the lumbar spine. CT CERVICAL SPINE WO CONTRAST    Result Date: 9/13/2021  EXAMINATION: CT OF THE CERVICAL SPINE WITHOUT CONTRAST 9/13/2021 10:05 am TECHNIQUE: CT of the cervical spine was performed without the administration of intravenous contrast. Multiplanar reformatted images are provided for review. Dose modulation, iterative reconstruction, and/or weight based adjustment of the mA/kV was utilized to reduce the radiation dose to as low as reasonably achievable. COMPARISON: None HISTORY: ORDERING SYSTEM PROVIDED HISTORY: trauma TECHNOLOGIST PROVIDED HISTORY: Reason for exam:->trauma Reason for Exam: fall Acuity: Acute Type of Exam: Initial Mechanism of Injury: fall Relevant Medical/Surgical History: unknown FINDINGS: BONES/ALIGNMENT: There is no acute fracture or traumatic malalignment. DEGENERATIVE CHANGES: Moderate to severe multilevel disc height loss and osteophytosis is present. Sagittal alignment is maintained. Physiologic rotation at C1-2 is noted. SOFT TISSUES: There is no prevertebral soft tissue swelling. No acute abnormality of the cervical spine.      CT LUMBAR RECONSTRUCTION WO POST PROCESS    Result Date: 9/13/2021  EXAMINATION: CT OF THE THORACIC SPINE WITHOUT CONTRAST; CT OF THE ABDOMEN AND PELVIS WITHOUT CONTRAST; CT OF THE CHEST WITHOUT CONTRAST; CT OF THE LUMBAR SPINE WITHOUT CONTRAST, 9/13/2021 10:06 am; 9/13/2021 10:17 am; 9/13/2021 10:05 am TECHNIQUE: CT of the thoracic spine was performed without the administration of intravenous contrast. Multiplanar reformatted images are provided for review. Dose modulation, iterative reconstruction, and/or weight based adjustment of the mA/kV was utilized to reduce the radiation dose to as low as reasonably achievable.; CT of the abdomen and pelvis was performed without the administration of intravenous contrast. Multiplanar reformatted images are provided for review. Dose modulation, iterative reconstruction, and/or weight based adjustment of the mA/kV was utilized to reduce the radiation dose to as low as reasonably achievable.; CT of the chest was performed without the administration of intravenous contrast. Multiplanar reformatted images are provided for review. Dose modulation, iterative reconstruction, and/or weight based adjustment of the mA/kV was utilized to reduce the radiation dose to as low as reasonably achievable.; CT of the lumbar spine was performed without the administration of intravenous contrast. Multiplanar reformatted images are provided for review. Adjustment of mA and/or kV according to patient size was utilized. Dose modulation, iterative reconstruction, and/or weight based adjustment of the mA/kV was utilized to reduce the radiation dose to as low as reasonably achievable.  COMPARISON: 04/04/2021 HISTORY: ORDERING SYSTEM PROVIDED HISTORY: trauma TECHNOLOGIST PROVIDED HISTORY: Reason for exam:->trauma Decision Support Exception - unselect if not a suspected or confirmed emergency medical condition->Emergency Medical Condition (MA) Reason for Exam: fall Acuity: Acute Type of Exam: Initial Mechanism of Injury: fall Relevant Medical/Surgical History: unknown; ORDERING SYSTEM PROVIDED HISTORY: trauma TECHNOLOGIST PROVIDED HISTORY: Reason for exam:->trauma Reason for Exam: fall Acuity: Acute Type of Exam: Initial Mechanism of Injury: fall Relevant Medical/Surgical History: unknown; ORDERING SYSTEM PROVIDED HISTORY: trauma TECHNOLOGIST PROVIDED HISTORY: Reason for exam:->trauma Additional Contrast?->None Reason for Exam: fall Acuity: Acute Type of Exam: Initial Mechanism of Injury: fall Relevant Medical/Surgical History: unknown FINDINGS: CT CHEST: No thoracic adenopathy. Heart size is normal.  No pericardial effusion. Thoracic aorta is normal caliber. Severe emphysema. No acute lung or pleural injury. No suspicious pulmonary nodule. The central airways are clear. No acute rib fracture. CT ABDOMEN: The unenhanced liver, spleen, pancreas, gallbladder, adrenal glands, and kidneys demonstrate no acute abnormality. No acute bowel or mesenteric injury on the noncontrast CT. Sigmoid diverticulosis. No hemoperitoneum or pneumoperitoneum. No abdominal aortic aneurysm. No adenopathy. CT PELVIS: No pelvic hematoma or free fluid. Distended urinary bladder. An acute traumatic subcapital right femoral neck fracture is present. No additional fracture is present in the pelvis. In the soft tissues superficial to the right greater trochanter, there is a soft tissue hematoma measuring 6 x 6 x 3 cm with surrounding bruising and soft tissue edema. No intramuscular hematoma. THORACIC/LUMBAR SPINE: No acute fracture in the thoracic spine. No lytic or destructive lesion. No spondylolisthesis. Lumbar vertebral body heights are maintained. No acute fracture in the lumbar spine. No traumatic spondylolisthesis. Mild degenerative changes. 1. No acute abnormality in the chest. 2. No acute abnormality in the abdomen or pelvis. 3. Fractured subcapital right femoral neck. Adjacent 6 cm superficial soft tissue hematoma.  4. No acute abnormality in the thoracic spine. 5. No acute abnormality in the lumbar spine. CT THORACIC RECONSTRUCTION WO POST PROCESS    Result Date: 9/13/2021  EXAMINATION: CT OF THE THORACIC SPINE WITHOUT CONTRAST; CT OF THE ABDOMEN AND PELVIS WITHOUT CONTRAST; CT OF THE CHEST WITHOUT CONTRAST; CT OF THE LUMBAR SPINE WITHOUT CONTRAST, 9/13/2021 10:06 am; 9/13/2021 10:17 am; 9/13/2021 10:05 am TECHNIQUE: CT of the thoracic spine was performed without the administration of intravenous contrast. Multiplanar reformatted images are provided for review. Dose modulation, iterative reconstruction, and/or weight based adjustment of the mA/kV was utilized to reduce the radiation dose to as low as reasonably achievable.; CT of the abdomen and pelvis was performed without the administration of intravenous contrast. Multiplanar reformatted images are provided for review. Dose modulation, iterative reconstruction, and/or weight based adjustment of the mA/kV was utilized to reduce the radiation dose to as low as reasonably achievable.; CT of the chest was performed without the administration of intravenous contrast. Multiplanar reformatted images are provided for review. Dose modulation, iterative reconstruction, and/or weight based adjustment of the mA/kV was utilized to reduce the radiation dose to as low as reasonably achievable.; CT of the lumbar spine was performed without the administration of intravenous contrast. Multiplanar reformatted images are provided for review. Adjustment of mA and/or kV according to patient size was utilized. Dose modulation, iterative reconstruction, and/or weight based adjustment of the mA/kV was utilized to reduce the radiation dose to as low as reasonably achievable.  COMPARISON: 04/04/2021 HISTORY: ORDERING SYSTEM PROVIDED HISTORY: trauma TECHNOLOGIST PROVIDED HISTORY: Reason for exam:->trauma Decision Support Exception - unselect if not a suspected or confirmed emergency medical condition->Emergency Medical Condition (MA) Reason for Exam: fall Acuity: Acute Type of Exam: Initial Mechanism of Injury: fall Relevant Medical/Surgical History: unknown; ORDERING SYSTEM PROVIDED HISTORY: trauma TECHNOLOGIST PROVIDED HISTORY: Reason for exam:->trauma Reason for Exam: fall Acuity: Acute Type of Exam: Initial Mechanism of Injury: fall Relevant Medical/Surgical History: unknown; ORDERING SYSTEM PROVIDED HISTORY: trauma TECHNOLOGIST PROVIDED HISTORY: Reason for exam:->trauma Additional Contrast?->None Reason for Exam: fall Acuity: Acute Type of Exam: Initial Mechanism of Injury: fall Relevant Medical/Surgical History: unknown FINDINGS: CT CHEST: No thoracic adenopathy. Heart size is normal.  No pericardial effusion. Thoracic aorta is normal caliber. Severe emphysema. No acute lung or pleural injury. No suspicious pulmonary nodule. The central airways are clear. No acute rib fracture. CT ABDOMEN: The unenhanced liver, spleen, pancreas, gallbladder, adrenal glands, and kidneys demonstrate no acute abnormality. No acute bowel or mesenteric injury on the noncontrast CT. Sigmoid diverticulosis. No hemoperitoneum or pneumoperitoneum. No abdominal aortic aneurysm. No adenopathy. CT PELVIS: No pelvic hematoma or free fluid. Distended urinary bladder. An acute traumatic subcapital right femoral neck fracture is present. No additional fracture is present in the pelvis. In the soft tissues superficial to the right greater trochanter, there is a soft tissue hematoma measuring 6 x 6 x 3 cm with surrounding bruising and soft tissue edema. No intramuscular hematoma. THORACIC/LUMBAR SPINE: No acute fracture in the thoracic spine. No lytic or destructive lesion. No spondylolisthesis. Lumbar vertebral body heights are maintained. No acute fracture in the lumbar spine. No traumatic spondylolisthesis. Mild degenerative changes.      1. No acute abnormality in the chest. 2. No acute abnormality in the abdomen or pelvis. 3. Fractured subcapital right femoral neck. Adjacent 6 cm superficial soft tissue hematoma. 4. No acute abnormality in the thoracic spine. 5. No acute abnormality in the lumbar spine. All labs, medications and tests reviewed by myself including data  from outside source , patient and available family . Continue all other medications of all above medical condition listed as is. Impression:  Active Problems:    History of recurrent deep vein thrombosis (DVT)    Chronic anticoagulation    PAD (peripheral artery disease) ([facility])    Rheumatic mitral stenosis    LVH (left ventricular hypertrophy)    Closed right [facility]  Resolved Problems:    * No resolved hospital problems. *      Assessment: 80 y. o.year old with PMH of  has a past medical history of Aortic valvular disease, Asthma, B12 deficiency, COPD (chronic obstructive pulmonary disease) ([facility]), Dementia ([facility] DVT (deep vein thrombosis) in pregnancy, H/O Doppler ABD Aortia ultrasound, H/O Doppler lower venous ultrasound, H/O echocardiogram, Hyperlipidemia, Hypertension, Hypothyroidism, Iron deficiency, Osteoarthritis, Peripheral neuropathy, Pulmonary nodule, Seasonal allergies, Spinal stenosis, and Type 2 diabetes mellitus without complication ([facility] Plan and Recommendations:    Clinically does not appear to be in acute coronary syndrome or decompensated heart failure but she has severe mitral stenosis and moderate to severe aortic stenosis given her advanced age multiple comorbidities hypoxia she is high risk for surgery  Okay to hold anticoagulation  Will provide supportive care be available as needed  DVT prophylaxis if no contraindication            Thank you  much for consult and giving us the opportunity in contributing in the care of this patient. Please feel free to call me for any questions.        Crystal Acuna MD, 9/14/2021 12:53 PM

## 2021-09-14 NOTE — PLAN OF CARE
Nutrition Problem #1: Severe malnutrition, In context of chronic illness  Intervention: Food and/or Nutrient Delivery: Continue NPO, Start Oral Diet (Please add Greek yogurt once able)  Nutritional Goals: Pt will have nutrition source by NPO day 5.

## 2021-09-14 NOTE — ED NOTES
Attempted to call report, RN unable to take it at this time. Will call back.       Rosalba Avila RN  09/13/21 2007

## 2021-09-14 NOTE — PROGRESS NOTES
1412 patient received to pacu for pre op monitor placed and alarms on.   Patient has history of dementia and is able to state her name and birthday

## 2021-09-14 NOTE — OP NOTE
DATE OF PROCEDURE:   9/14/2021     PREOPERATIVE DIAGNOSIS:  Right femoral neck fracture. POSTOPERATIVE DIAGNOSIS:  Right femoral neck fracture. PROCEDURE:  Right hip hemiarthroplasty using [employer] Avenir  complete size 6.5 pressfit   femoral stem and size 46-3.5 bipolar femoral head. ATTENDING SURGEON:  Svetlana Dunn DO     FIRST ASSISTANT: DULCE MARIA Felder    ANESTHESIA:   General .     ESTIMATED BLOOD LOSS:  150 mL. FLUIDS:  400 mL crystalloids and 250 mL albumin. INDICATIONS OF PROCEDURE:  The patient is an [address]year-old female who  sustained a fall landing onto her right hip apparently at some time over the last several days where she was treated at an Alzheimer's unit. [place] where she was diagnosed with a right  hip fracture. Given the fracture, and with her medical comorbidities I discussed conservative versus surgical options with her to garcia of . After discussing both options with them they opted to have the patient undergo surgical treatment. I  explained the risks, benefits and possible complications of the procedure  to the patient's garcia of , and after answering all of her questions,  her power of  consented to have the patient undergo the above procedure. The hospitalist and cardiologist provided preoperative  clearance prior to proceeding with surgical treatment. DESCRIPTION OF PROCEDURE:  The patient was seen and evaluated in the  preoperative holding area where the right lower extremity was signed in her  presence. At this point, care of the patient was turned over to anesthesia team who transported her back to the operative suite. General   anesthesia was performed and once adequate anesthesia was obtained, she was  placed in the lateral decubitus position. The right lower extremity was  then prepped and draped in usual sterile fashion. Preoperative antibiotics were administered.   At this in  place until it was firmly seated. I then inserted a trial size  46-3.5   Bipolar head. I then had my assistant perform traction and internal  rotation. I then reduced the hip within the acetabulum. I then had my  assistant run the hip through a full range of motion and found there to be  excellent stability throughout range of motion with equal leg lengths  compared to contralateral side. This was then selected for the final  implant component. I then had my assistant perform tracking and external  rotation of the dislocated hip and the acetabulum. The trial head was then  removed. I then irrigated the acetabulum further with pulse lavage. I  then inserted the final size  46-3.5 bipolar femoral head, which was  malleted in place until it was firmly seated. I then had my assistant  perform traction and internal rotation. I then reduced the hip within the  acetabulum. My assistant then run the hip through a full range of motion  and we found there to be excellent stability throughout range of motion  with equal leg lengths compared to contralateral side. The joint was then irrigated further with pulse lavage. I then injected the capsule with 1 g of TXA. I then  re-approximated the hip capsule as well as the gluteus medius layer using  #5 Ethibond suture in a running fashion. I then closed the IT band using  #1 Vicryl suture in a figure-of-eight fashion. I then closed subcutaneous  tissues using 2-0 Vicryl suture followed by skin closure with stratafix and prineo. Adequate hemostasis was maintained at all times. I then applied a sterile  soft dressing to the right lower extremity and the patient was then  awakened from anesthesia and transported to PACU in stable condition. She  appeared to have tolerated the procedure well. PROGNOSIS:  At this point, she will be admitted back to the hospital for  postoperative pain control, rehabilitation and medical monitoring.    Hospitalist will continue with medical management and physical therapy will  be consulted for gait training and she could be weightbearing as tolerated  on the right leg. She will receive antibiotic prophylaxis and DVT  prophylaxis during her hospitalization. Following her discharge, I will see  her back in the office in 3 weeks and will continue to  monitor her progress in the outpatient setting for resolution of her  symptoms.            Vance 97, DO

## 2021-09-15 ENCOUNTER — APPOINTMENT (OUTPATIENT)
Dept: ULTRASOUND IMAGING | Age: 86
DRG: 521 | End: 2021-09-15
Payer: MEDICARE

## 2021-09-15 LAB
ABO/RH: NORMAL
ANION GAP SERPL CALCULATED.3IONS-SCNC: 10 MMOL/L (ref 4–16)
ANTIBODY SCREEN: NEGATIVE
BASOPHILS ABSOLUTE: 0 K/CU MM
BASOPHILS RELATIVE PERCENT: 0.3 % (ref 0–1)
BUN BLDV-MCNC: 27 MG/DL (ref 6–23)
CALCIUM SERPL-MCNC: 7.9 MG/DL (ref 8.3–10.6)
CHLORIDE BLD-SCNC: 98 MMOL/L (ref 99–110)
CO2: 26 MMOL/L (ref 21–32)
CREAT SERPL-MCNC: 0.7 MG/DL (ref 0.6–1.1)
DIFFERENTIAL TYPE: ABNORMAL
EOSINOPHILS ABSOLUTE: 0 K/CU MM
EOSINOPHILS RELATIVE PERCENT: 0.2 % (ref 0–3)
GFR AFRICAN AMERICAN: >60 ML/MIN/1.73M2
GFR NON-AFRICAN AMERICAN: >60 ML/MIN/1.73M2
GLUCOSE BLD-MCNC: 163 MG/DL (ref 70–99)
HCT VFR BLD CALC: 24.7 % (ref 37–47)
HCT VFR BLD CALC: 25.7 % (ref 37–47)
HEMOGLOBIN: 7.4 GM/DL (ref 12.5–16)
HEMOGLOBIN: 7.7 GM/DL (ref 12.5–16)
IMMATURE NEUTROPHIL %: 0.5 % (ref 0–0.43)
LYMPHOCYTES ABSOLUTE: 0.5 K/CU MM
LYMPHOCYTES RELATIVE PERCENT: 4.4 % (ref 24–44)
MAGNESIUM: 1.8 MG/DL (ref 1.8–2.4)
MCH RBC QN AUTO: 26.4 PG (ref 27–31)
MCHC RBC AUTO-ENTMCNC: 30 % (ref 32–36)
MCV RBC AUTO: 88 FL (ref 78–100)
MONOCYTES ABSOLUTE: 0.9 K/CU MM
MONOCYTES RELATIVE PERCENT: 8.3 % (ref 0–4)
NUCLEATED RBC %: 0 %
PDW BLD-RTO: 15.6 % (ref 11.7–14.9)
PLATELET # BLD: 147 K/CU MM (ref 140–440)
PMV BLD AUTO: 12.1 FL (ref 7.5–11.1)
POTASSIUM SERPL-SCNC: 3.8 MMOL/L (ref 3.5–5.1)
RBC # BLD: 2.92 M/CU MM (ref 4.2–5.4)
SEGMENTED NEUTROPHILS ABSOLUTE COUNT: 9.2 K/CU MM
SEGMENTED NEUTROPHILS RELATIVE PERCENT: 86.3 % (ref 36–66)
SODIUM BLD-SCNC: 134 MMOL/L (ref 135–145)
TOTAL IMMATURE NEUTOROPHIL: 0.05 K/CU MM
TOTAL NUCLEATED RBC: 0 K/CU MM
WBC # BLD: 10.7 K/CU MM (ref 4–10.5)

## 2021-09-15 PROCEDURE — 99232 SBSQ HOSP IP/OBS MODERATE 35: CPT | Performed by: INTERNAL MEDICINE

## 2021-09-15 PROCEDURE — 6370000000 HC RX 637 (ALT 250 FOR IP): Performed by: ORTHOPAEDIC SURGERY

## 2021-09-15 PROCEDURE — 2580000003 HC RX 258: Performed by: PHYSICIAN ASSISTANT

## 2021-09-15 PROCEDURE — 36415 COLL VENOUS BLD VENIPUNCTURE: CPT

## 2021-09-15 PROCEDURE — 94761 N-INVAS EAR/PLS OXIMETRY MLT: CPT

## 2021-09-15 PROCEDURE — 6360000002 HC RX W HCPCS: Performed by: ORTHOPAEDIC SURGERY

## 2021-09-15 PROCEDURE — 80048 BASIC METABOLIC PNL TOTAL CA: CPT

## 2021-09-15 PROCEDURE — 1200000000 HC SEMI PRIVATE

## 2021-09-15 PROCEDURE — 85018 HEMOGLOBIN: CPT

## 2021-09-15 PROCEDURE — 97535 SELF CARE MNGMENT TRAINING: CPT

## 2021-09-15 PROCEDURE — 86901 BLOOD TYPING SEROLOGIC RH(D): CPT

## 2021-09-15 PROCEDURE — 2580000003 HC RX 258: Performed by: ORTHOPAEDIC SURGERY

## 2021-09-15 PROCEDURE — 85025 COMPLETE CBC W/AUTO DIFF WBC: CPT

## 2021-09-15 PROCEDURE — 86900 BLOOD TYPING SEROLOGIC ABO: CPT

## 2021-09-15 PROCEDURE — 83735 ASSAY OF MAGNESIUM: CPT

## 2021-09-15 PROCEDURE — 97530 THERAPEUTIC ACTIVITIES: CPT

## 2021-09-15 PROCEDURE — 97166 OT EVAL MOD COMPLEX 45 MIN: CPT

## 2021-09-15 PROCEDURE — 97162 PT EVAL MOD COMPLEX 30 MIN: CPT

## 2021-09-15 PROCEDURE — 85014 HEMATOCRIT: CPT

## 2021-09-15 PROCEDURE — 86850 RBC ANTIBODY SCREEN: CPT

## 2021-09-15 PROCEDURE — APPSS45 APP SPLIT SHARED TIME 31-45 MINUTES: Performed by: NURSE PRACTITIONER

## 2021-09-15 PROCEDURE — 93970 EXTREMITY STUDY: CPT

## 2021-09-15 RX ORDER — 0.9 % SODIUM CHLORIDE 0.9 %
250 INTRAVENOUS SOLUTION INTRAVENOUS ONCE
Status: DISCONTINUED | OUTPATIENT
Start: 2021-09-15 | End: 2021-09-15

## 2021-09-15 RX ORDER — SODIUM CHLORIDE, SODIUM LACTATE, POTASSIUM CHLORIDE, AND CALCIUM CHLORIDE .6; .31; .03; .02 G/100ML; G/100ML; G/100ML; G/100ML
500 INJECTION, SOLUTION INTRAVENOUS ONCE
Status: COMPLETED | OUTPATIENT
Start: 2021-09-15 | End: 2021-09-15

## 2021-09-15 RX ORDER — 0.9 % SODIUM CHLORIDE 0.9 %
250 INTRAVENOUS SOLUTION INTRAVENOUS ONCE
Status: COMPLETED | OUTPATIENT
Start: 2021-09-15 | End: 2021-09-15

## 2021-09-15 RX ORDER — SODIUM CHLORIDE, SODIUM LACTATE, POTASSIUM CHLORIDE, CALCIUM CHLORIDE 600; 310; 30; 20 MG/100ML; MG/100ML; MG/100ML; MG/100ML
INJECTION, SOLUTION INTRAVENOUS CONTINUOUS
Status: ACTIVE | OUTPATIENT
Start: 2021-09-15 | End: 2021-09-15

## 2021-09-15 RX ADMIN — TRAMADOL HYDROCHLORIDE 75 MG: 50 TABLET, FILM COATED ORAL at 05:24

## 2021-09-15 RX ADMIN — ASPIRIN 325 MG: 325 TABLET, COATED ORAL at 09:10

## 2021-09-15 RX ADMIN — SODIUM CHLORIDE, PRESERVATIVE FREE 10 ML: 5 INJECTION INTRAVENOUS at 21:12

## 2021-09-15 RX ADMIN — SODIUM CHLORIDE, POTASSIUM CHLORIDE, SODIUM LACTATE AND CALCIUM CHLORIDE: 600; 310; 30; 20 INJECTION, SOLUTION INTRAVENOUS at 20:58

## 2021-09-15 RX ADMIN — Medication 5000 UNITS: at 09:09

## 2021-09-15 RX ADMIN — SODIUM CHLORIDE, PRESERVATIVE FREE 10 ML: 5 INJECTION INTRAVENOUS at 09:10

## 2021-09-15 RX ADMIN — ACETAMINOPHEN 650 MG: 325 TABLET ORAL at 17:00

## 2021-09-15 RX ADMIN — SERTRALINE HYDROCHLORIDE 50 MG: 50 TABLET ORAL at 09:10

## 2021-09-15 RX ADMIN — SODIUM CHLORIDE, POTASSIUM CHLORIDE, SODIUM LACTATE AND CALCIUM CHLORIDE 500 ML: 600; 310; 30; 20 INJECTION, SOLUTION INTRAVENOUS at 16:56

## 2021-09-15 RX ADMIN — BISACODYL 5 MG: 5 TABLET, COATED ORAL at 09:10

## 2021-09-15 RX ADMIN — ACETAMINOPHEN 650 MG: 325 TABLET ORAL at 01:09

## 2021-09-15 RX ADMIN — SENNOSIDES AND DOCUSATE SODIUM 1 TABLET: 50; 8.6 TABLET ORAL at 09:10

## 2021-09-15 RX ADMIN — ACETAMINOPHEN 650 MG: 325 TABLET ORAL at 11:54

## 2021-09-15 RX ADMIN — ENOXAPARIN SODIUM 40 MG: 40 INJECTION SUBCUTANEOUS at 09:10

## 2021-09-15 RX ADMIN — SODIUM CHLORIDE 250 ML: 9 INJECTION, SOLUTION INTRAVENOUS at 10:57

## 2021-09-15 RX ADMIN — ASPIRIN 325 MG: 325 TABLET, COATED ORAL at 21:12

## 2021-09-15 RX ADMIN — SODIUM CHLORIDE, POTASSIUM CHLORIDE, SODIUM LACTATE AND CALCIUM CHLORIDE: 600; 310; 30; 20 INJECTION, SOLUTION INTRAVENOUS at 12:06

## 2021-09-15 RX ADMIN — TRAMADOL HYDROCHLORIDE 50 MG: 50 TABLET, FILM COATED ORAL at 17:00

## 2021-09-15 RX ADMIN — BUSPIRONE HYDROCHLORIDE 5 MG: 5 TABLET ORAL at 09:10

## 2021-09-15 RX ADMIN — SENNOSIDES AND DOCUSATE SODIUM 1 TABLET: 50; 8.6 TABLET ORAL at 21:12

## 2021-09-15 RX ADMIN — SODIUM CHLORIDE, POTASSIUM CHLORIDE, SODIUM LACTATE AND CALCIUM CHLORIDE: 600; 310; 30; 20 INJECTION, SOLUTION INTRAVENOUS at 04:30

## 2021-09-15 RX ADMIN — PANTOPRAZOLE SODIUM 40 MG: 40 TABLET, DELAYED RELEASE ORAL at 05:26

## 2021-09-15 RX ADMIN — Medication 5 MG: at 21:12

## 2021-09-15 RX ADMIN — ACETAMINOPHEN 650 MG: 325 TABLET ORAL at 05:24

## 2021-09-15 RX ADMIN — CEFAZOLIN SODIUM 1000 MG: 1 INJECTION, SOLUTION INTRAVENOUS at 01:12

## 2021-09-15 RX ADMIN — LEVOTHYROXINE SODIUM 75 MCG: 0.07 TABLET ORAL at 05:25

## 2021-09-15 RX ADMIN — CEFAZOLIN SODIUM 1000 MG: 1 INJECTION, SOLUTION INTRAVENOUS at 07:08

## 2021-09-15 ASSESSMENT — PAIN DESCRIPTION - ORIENTATION
ORIENTATION: RIGHT
ORIENTATION: RIGHT

## 2021-09-15 ASSESSMENT — PAIN DESCRIPTION - LOCATION
LOCATION: HIP
LOCATION: HIP

## 2021-09-15 ASSESSMENT — PAIN DESCRIPTION - PROGRESSION
CLINICAL_PROGRESSION: NOT CHANGED
CLINICAL_PROGRESSION: NOT CHANGED

## 2021-09-15 ASSESSMENT — PAIN DESCRIPTION - DESCRIPTORS
DESCRIPTORS: ACHING
DESCRIPTORS: ACHING

## 2021-09-15 ASSESSMENT — PAIN DESCRIPTION - PAIN TYPE
TYPE: SURGICAL PAIN
TYPE: SURGICAL PAIN

## 2021-09-15 ASSESSMENT — PAIN SCALES - GENERAL
PAINLEVEL_OUTOF10: 4
PAINLEVEL_OUTOF10: 5
PAINLEVEL_OUTOF10: 6
PAINLEVEL_OUTOF10: 10

## 2021-09-15 ASSESSMENT — PAIN DESCRIPTION - ONSET
ONSET: ON-GOING
ONSET: ON-GOING

## 2021-09-15 ASSESSMENT — PAIN DESCRIPTION - FREQUENCY
FREQUENCY: CONTINUOUS
FREQUENCY: CONTINUOUS

## 2021-09-15 ASSESSMENT — PAIN - FUNCTIONAL ASSESSMENT
PAIN_FUNCTIONAL_ASSESSMENT: PREVENTS OR INTERFERES SOME ACTIVE ACTIVITIES AND ADLS
PAIN_FUNCTIONAL_ASSESSMENT: PREVENTS OR INTERFERES SOME ACTIVE ACTIVITIES AND ADLS

## 2021-09-15 NOTE — PROGRESS NOTES
Paty Tang is a 80 y.o. female patient. 1. Closed fracture of neck of right femur, initial encounter ([facility] [facility]      Past Medical History:   Diagnosis Date    Aortic valvular disease     Asthma     B12 deficiency     COPD (chronic obstructive pulmonary disease) (HCC)     Dementia (HCC)     DVT (deep vein thrombosis) in pregnancy     H/O Doppler ABD Aortia ultrasound 06/05/2019    NO evidence of AAA within the visualized portions of the abdominal aorta, possible right iliac artery stenosis    H/O Doppler lower venous ultrasound 06/05/2019    NO DVT OR SVT, No significant reflux in right, Significant reflux in Left CFV    H/O echocardiogram 06/05/2019    EF 45%, Moderate basal anteroseptal wall asymmetrical left ventricular hypertrophy, Left atrium mild to mod dilated, Mild to Mod AS, Mitral stenosis is severe, Mod AR and Mod Pulmonic regurg, Mild MR, TR, Mod Pulm HTN    Hyperlipidemia     Hypertension     Hypothyroidism     Iron deficiency     Osteoarthritis     Peripheral neuropathy     on EMG    Pulmonary nodule     thought benign    Seasonal allergies     Spinal stenosis     Type 2 diabetes mellitus without complication ([facility]      No past surgical history pertinent negatives on file.   Scheduled Meds:   sodium chloride flush  10 mL IntraVENous 2 times per day    sennosides-docusate sodium  1 tablet Oral BID    aspirin  325 mg Oral BID    lidocaine  5 mL IntraDERmal Once    sodium chloride flush  5-40 mL IntraVENous 2 times per day    enoxaparin  40 mg SubCUTAneous Daily    acetaminophen  650 mg Oral Q6H    bisacodyl  5 mg Oral Daily    melatonin  5 mg Oral Nightly    sertraline  50 mg Oral Daily    levothyroxine  75 mcg Oral Daily    vitamin D  5,000 Units Oral Daily    busPIRone  5 mg Oral BID    pantoprazole  40 mg Oral QAM AC     Continuous Infusions:   lactated ringers 100 mL/hr at 09/15/21 0430    sodium chloride      sodium chloride       PRN Meds:sodium chloride flush, sodium chloride, acetaminophen, magnesium hydroxide, dextrose, sodium chloride flush, sodium chloride, ondansetron **OR** ondansetron, polyethylene glycol, traMADol **OR** traMADol    Allergies   Allergen Reactions    Iodine Shortness Of Breath    Bee Pollen Swelling    Tetanus Toxoids Itching    Penicillins Rash     Active Problems:    History of recurrent deep vein thrombosis (DVT)    Chronic anticoagulation    PAD (peripheral artery disease) (HCC)    Rheumatic mitral stenosis    LVH (left ventricular hypertrophy)    [facility]  Resolved Problems:    * No resolved hospital problems. *    Blood pressure (!) 84/49, pulse 92, temperature 97.9 °F (36.6 °C), temperature source Oral, resp. rate 14, height 5' 5\" (1.651 m), weight 107 lb 2.3 oz (48.6 kg), SpO2 100 %. Subjective   Patient seen and examined, resting in bed comfortably, pain controlled, no new complaints. Continued confusion and dementia and continues to complain of some pain in the right hip. Objective   RLE - Dressing clean, dry, intact, no calf TTP, compartments soft, NVID  Moderate pain in the right hip with range of motion. Assessment & Plan  POD #1 R Hip edward, Doing well postoperatively    Continue weight bearing as tolerated.   Continue range of motion exercises  Pain control  DVT prophylaxis  Continue PT/OT  Discharge planning    Jose E Chadwick DO  9/15/2021

## 2021-09-15 NOTE — CARE COORDINATION
F/u with phone call to Larry/ELLA. [facility]. Pt will receive [facility] at [facility]. Pt can discharge when medically stable. Call to Pt POA and left voice mail. PS sent [facility] hospitlist pt is not medially stable at this time.

## 2021-09-15 NOTE — CONSULTS
[address] [facility] ACUTE CARE PHYSICAL THERAPY EVALUATION  Demetrio Venegas, 6/11/1931, 1110/1110-A, 9/15/2021    History  [place]:  The encounter diagnosis was Closed fracture of neck of right femur, initial encounter ([address] Patient  has a past medical history of Aortic valvular disease, Asthma, B12 deficiency, COPD (chronic obstructive pulmonary disease) ([address] Dementia ([address] DVT (deep vein thrombosis) in pregnancy, H/O Doppler ABD Aortia ultrasound, H/O Doppler lower venous ultrasound, H/O echocardiogram, Hyperlipidemia, Hypertension, Hypothyroidism, Iron deficiency, Osteoarthritis, Peripheral neuropathy, Pulmonary nodule, Seasonal allergies, Spinal stenosis, and Type 2 diabetes mellitus without complication ([address] Patient  has a past surgical history that includes Appendectomy; eye surgery; ольга and bso (cervix removed) (1971); knee surgery (Right, 1990); lymphadenectomy (5446); Nose surgery; Tonsillectomy and adenoidectomy; Breast biopsy; Rotator cuff repair (Left, 2005); Dental surgery (1963); and Thoracic disc surgery (1976). Subjective:  Patient states:  \"I still teach third graders. \"    Pain:  Does not numerically rate pain but states pain in right hip. Communication with other providers:  Handoff to RN, OT  Restrictions: WBAT R LE, R anterior hip precautions, fall risk, general precautions    Home Setup/Prior level of function   Pt poor historian. Per charting, pt from memory care unit. Examination of body systems (includes body structures/functions, activity/participation limitations):  · Observation:  Pt supine in bed upon arrival and agreeable to therapy  · Vision:  [facility]  · Hearing:  Slightly [place]  · Cardiopulmonary:  2L O2  · Cognition: impaired, pt oriented to self and that she is in a hospital, see OT/SLP note for further evaluation. Musculoskeletal  · ROM R/L:  WFL. · Strength R/L:  3-/5, moderate impairment in function and endurance.     · Neuro:  Appears WFL      Mobility:  · Rolling L/R:  Max A with cues for sequencing  · Supine to sit:  Mod A x2 with cues for sequencing and increased time and effort to complete  · Transfers: Pt completed STS to/from bed x2 mod A x2 to RW with cues for sequencing and hand placement. 3 count utilized to initiate transfer. Pt completed stand pivot transfer without an AD max A with cues for sequencing. · Sitting balance:  Fair, pt sat EOB ~5 minutes min A progressing to SBA. · Standing balance:  Poor, pt stood with forward flexed posture, R knee flexion, and max A at RW ~20 seconds for two trials. Cues provided for standing posture throughout. · Gait: NT due to safety concerns    [facility] 6 Clicks Inpatient Mobility:  AM-PAC Inpatient Mobility Raw Score : 10    Safety: patient left in chair with alarm on, call light within reach, RN notified, gait belt used. Assessment:  Pt is a 80 y.o. female admitted to the hospital after a fall resulting in a right femoral neck fracture. Pt underwent a R hip hemiarthroplasty on 9/14/2021. Pt is typically at a memory care unit and unsure of PLOF. Pt is currently performing bed mobility mod A x2, transferring mod A x2, and unable to ambulate at this time. Pt is presenting with decreased endurance, impaired balance, impaired bed mobility, impaired transfers, impaired gait, decreased strength, impaired cognition, and increased pain. Pt would benefit from continued acute care PT as well as SNF placement upon discharge to continue to address impairments. Complexity: moderate    Prognosis: Good, no significant barriers to participation at this time.      Plan Times per week: 6+/week     Equipment: TBD at next level of care    Goals:  Short term goals  Time Frame for Short term goals: 1 week  Short term goal 1: Pt to complete all bed mobility mod A x1  Short term goal 2: Pt to complete all STS transfers to/from bed, commode, and chair mod A x1  Short term goal 3: Pt to complete stand pivot with LRAD mod A x1  Short term goal 4: Pt to ambulate 13' with LRAD mod A       Treatment plan:  Bed mobility, transfers, balance, gait, TA, TX    Recommendations for NURSING mobility: stand pivot max A with gait belt    Time:   Time in: 1020  Time out: 1050  Timed treatment minutes: 20  Total time: 30    Electronically signed by:    Ana Parks, LUCIA  9/15/2021, 10:55 AM

## 2021-09-15 NOTE — PROGRESS NOTES
Hospitalist Progress Note      Name:  Demetrio Venegas /Age/Sex: 1931  (80 y.o. female)   MRN & CSN:  [id number] & [id number] Admission Date/Time: 2021  8:17 AM   [facility] P[facility]: No primary care provider on file.          Hospital Day: 3    Assessment and Plan:   Demetrio Venegas is a 80 y.o.  female  who presents with right hip fracture and hematoma.      Closed right hip fracture   Right hip hematoma  -Admit CT with fractured subcapital right femoral neck with adjacent 6 cm superficial soft tissue hematoma  -S/p R hip hemiarthroplasty 21  - DVT prophylaxis with ASA  -Continue scheduled Tylenol, as needed Ultram     Postoperative hypotension   - BP 87/50, asymptomatic   - likely secondary to hypovolemia in setting of blood loss  - hold buspar   - IVF bolus PRN   - continue mIVF, monitor H&H    Postoperative anemia   - Hgb 10-->7.7  - no evidence of active bleeding, likely secondary to surgery   - monitor closely as patient was on Xarelto prior to surgery, may consider imaging if worsening   - monitor H&H, type and screen, transfuse Hgb <7    History of DVT  -Remote history  -On Xarelto, but held in setting of hematoma  - may need to consider holding Xarelto indefinitely as fall risk in setting of dementia  -Bilateral lower extremity edema noted, lower extremity Doppler pending    Chronic HFpEF  Severe valvular disease  - echo 21 showed EF 78%, grade 3 diastolic dysfunction, severely dilated left atrium, severe aortic stenosis, severe mitral stenosis, severe tricuspid regurgitation  - appears hypovolemic following surgery   - monitor volume status closely, diurese cautiously   -Cardiology following postoperatively     Cachexia  Malnutrition  -Admit BMI 16  -Consult dietitian     Osteoporosis  -Recommend repeat bone density scan as outpatient  -Continue home vitamin D     Fall  History of dementia  -Alert and oriented to self only  -On fall precautions and strict bedrest  -PT/OT following surgery     T2DM  -Hemoglobin A1c 5.3  -Monitor blood glucose    This patient was seen and examined autonomously. I discussed patient with Dr. Emelyn Gomes and he was agreeable with management. Diet ADULT DIET; Regular   DVT Prophylaxis [x] Lovenox, []  Heparin, [] SCDs, [] Ambulation   GI Prophylaxis [] PPI,  [] H2 Blocker,  [] Carafate,  [x] Diet/Tube Feeds   Code Status Full Code   Disposition Patient requires continued admission due to post-op, hypotension         History of Present Illness:     Chief Complaint: Bao Carter is a 80 y.o.  female  who presents with right hip pain. Patient seen and examined at bedside. No acute events overnight. Patient noted to be hypotensive this morning however she denies any lightheadedness and is generally feeling well. She remembered me from yesterday however she is still confused which appears to be her baseline. She is complaining of right hip pain. Otherwise, she has no complaints. Ten point ROS reviewed negative, unless as noted above    Objective: Intake/Output Summary (Last 24 hours) at 9/15/2021 1201  Last data filed at 9/15/2021 0910  Gross per 24 hour   Intake 2410 ml   Output 100 ml   Net 2310 ml      Vitals:   Vitals:    09/15/21 0905   BP: (!) 84/49   Pulse: 92   Resp: 14   Temp: 97.9 °F (36.6 °C)   SpO2: 100%     Physical Exam:     GEN Awake female, sitting upright in bed in no apparent distress. Appears given age. Cachectic. EYES Pupils are equally round. No scleral erythema, discharge, or conjunctivitis. HENT Mucous membranes are moist.  NECK Supple, no apparent thyromegaly or masses. RESP Clear to auscultation, no wheezes, rales or rhonchi. Respirations even and unlabored on RA. CARDIO/VASC   S1/S2 auscultated. Regular rate without appreciable murmurs, rubs, or gallops. Peripheral pulses equal bilaterally and palpable. No peripheral edema. GI Abdomen is soft without significant tenderness, masses, or guarding.  Bowel sounds are normoactive.  No costovertebral angle tenderness. Rosales catheter is not present. MSK No gross joint deformities. Able to wiggle toes bilaterally. Pain with movement of hip. Right hip with clean dry bandage, surgical site soft. SKIN Normal coloration, warm, dry. NEURO Cranial nerves appear grossly intact, normal speech, no lateralizing weakness. PSYCH Awake, alert, oriented x 4. Affect appropriate.     Medications:   Medications:    sodium chloride flush  10 mL IntraVENous 2 times per day    sennosides-docusate sodium  1 tablet Oral BID    aspirin  325 mg Oral BID    lidocaine  5 mL IntraDERmal Once    sodium chloride flush  5-40 mL IntraVENous 2 times per day    enoxaparin  40 mg SubCUTAneous Daily    acetaminophen  650 mg Oral Q6H    bisacodyl  5 mg Oral Daily    melatonin  5 mg Oral Nightly    sertraline  50 mg Oral Daily    levothyroxine  75 mcg Oral Daily    vitamin D  5,000 Units Oral Daily    busPIRone  5 mg Oral BID    pantoprazole  40 mg Oral QAM AC      Infusions:    lactated ringers 75 mL/hr at 09/15/21 1057    sodium chloride      sodium chloride       PRN Meds: sodium chloride flush, 10 mL, PRN  sodium chloride, 25 mL, PRN  acetaminophen, 650 mg, Q4H PRN  magnesium hydroxide, 30 mL, Daily PRN  dextrose, 12.5 g, PRN  sodium chloride flush, 5-40 mL, PRN  sodium chloride, 25 mL, PRN  ondansetron, 4 mg, Q8H PRN   Or  ondansetron, 4 mg, Q6H PRN  polyethylene glycol, 17 g, Daily PRN  traMADol, 50 mg, Q6H PRN   Or  traMADol, 75 mg, Q6H PRN          Electronically signed by Gena Mcclure PA-C on 9/15/2021 at 12:01 PM

## 2021-09-15 NOTE — PROGRESS NOTES
allergies, Spinal stenosis, and Type 2 diabetes mellitus without complication ([facility] [address] Past surgical history:   has a past surgical history that includes Appendectomy; eye surgery; ольга and bso (cervix removed) (1971); knee surgery (Right, 1990); lymphadenectomy (8813); Nose surgery; Tonsillectomy and adenoidectomy; Breast biopsy; Rotator cuff repair (Left, 2005); Dental surgery (1963); and Thoracic disc surgery (1976). Social History:   reports that she has quit smoking. She has never used smokeless tobacco. She reports that she does not drink alcohol and does not use drugs. Family history:  family history includes Cancer in her brother and sister; Heart Disease in her brother, father, and mother; High Blood Pressure in her father and mother; Osteoporosis in her mother; Stroke in her father and mother. Allergies   Allergen Reactions    Iodine Shortness Of Breath    Bee Pollen Swelling    Tetanus Toxoids Itching    Penicillins Rash       Review of Systems   All 14 systems were reviewed and are negative  Except for the positive findings  which as documented     BP (!) 82/47   Pulse 94   Temp 98.4 °F (36.9 °C) (Oral)   Resp 14   Ht 5' 5\" (1.651 m)   Wt 107 lb 2.3 oz (48.6 kg)   SpO2 94%   BMI 17.83 kg/m²       Intake/Output Summary (Last 24 hours) at 9/15/2021 0716  Last data filed at 9/15/2021 0528  Gross per 24 hour   Intake 2400 ml   Output 100 ml   Net 2300 ml       Physical Exam  Vitals reviewed. Constitutional:       General: She is not in acute distress. Appearance: Normal appearance. She is not ill-appearing. HENT:      Head: Atraumatic. Neck:      Vascular: No carotid bruit. Cardiovascular:      Rate and Rhythm: Normal rate and regular rhythm. Pulses: Normal pulses. Heart sounds: Normal heart sounds. No murmur heard. Pulmonary:      Effort: Pulmonary effort is normal. No respiratory distress. Breath sounds: Normal breath sounds.    Musculoskeletal:         General: No swelling or deformity. Cervical back: Neck supple. No muscular tenderness. Neurological:      Mental Status: She is alert. Telemetry Reviewed:   Not on telemetry    Medications:    sodium chloride flush  10 mL IntraVENous 2 times per day    sennosides-docusate sodium  1 tablet Oral BID    aspirin  325 mg Oral BID    ceFAZolin  1,000 mg IntraVENous Q8H    lidocaine  5 mL IntraDERmal Once    sodium chloride flush  5-40 mL IntraVENous 2 times per day    enoxaparin  40 mg SubCUTAneous Daily    acetaminophen  650 mg Oral Q6H    bisacodyl  5 mg Oral Daily    melatonin  5 mg Oral Nightly    sertraline  50 mg Oral Daily    levothyroxine  75 mcg Oral Daily    vitamin D  5,000 Units Oral Daily    busPIRone  5 mg Oral BID    pantoprazole  40 mg Oral QAM AC      lactated ringers 100 mL/hr at 09/15/21 0430    sodium chloride      sodium chloride       sodium chloride flush, sodium chloride, acetaminophen, magnesium hydroxide, dextrose, sodium chloride flush, sodium chloride, ondansetron **OR** ondansetron, polyethylene glycol, traMADol **OR** traMADol    Lab Data:  CBC:   Recent Labs     09/13/21  1000 09/14/21  0526   WBC 12.6* 9.6   HGB 10.7* 10.0*   HCT 34.2* 33.0*   MCV 85.3 87.3    156     BMP:   Recent Labs     09/13/21  1000 09/14/21  0526    139   K 3.8 3.9   CL 98* 99   CO2 25 28   BUN 22 29*   CREATININE 0.7 0.9     PT/INR:   Recent Labs     09/13/21  1000 09/14/21  0526   PROTIME 17.6* 14.9*   INR 1.36 1.15     BNP:  No results for input(s): PROBNP in the last 72 hours. TROPONIN: No results for input(s): TROPONINT in the last 72 hours. ECHO :   Echocardiogram 9/14/2021  Summary   Very technically difficult study due to lack of patient cooperation. Left ventricular systolic function is normal.   Ejection fraction is visually estimated at 55-60%. Grade III diastolic dysfunction. Severely dilated left atrium.    Severe aortic stenosis; RAMONITA: 0.97 cm sq/ 0.74 cm sq by planimetry, mean P mmHg. Gradients and velocities are underestimated due to inadequate imaging of aortic valve. Patient prematurely ended exam.Leaflets are heavily calcified Severe mitral stenosis; mean P mmHg, MVA (PISA): 0.61 cm sq. Severe tricuspid regurgitation; RVSP: 79 mmHg. Severe PHTN. Moderate pulmonic regurgitation present. No evidence of any pericardial effusion. All labs, medications and tests reviewed by myself , continue all other medications of all above medical condition listed as is except for changes mentioned above. Thank you very much for consult , please call with questions. Electronically signed by TOBY Maxwell CNP on 9/15/2021 at 7:16 AM      CARDIOLOGY ATTENDING ADDENDUM    I have seen ,spoken to  and examined this patient personally, independently of the nurse practitioner. I have reviewed the hospital care given to date and reviewed all pertinent labs and imaging. The plan was developed mutually at the time of the visit with the patient,  NP   and myself. I have spoken with patient, nursing staff and provided written and verbal instructions . The above note has been reviewed and I agree with the assessment, diagnosis, and treatment plan with changes made by me as follows     HPI:  I have reviewed the above HPI  And agree with above   Feliciano Jerome is a 80 y. o.year old who and presents with had concerns including Hip Pain.   Chief Complaint   Patient presents with    Hip Pain     Please review addendum/changes made to note above   Interval history:  Confused but says pain is better    Physical Exam:  General:  Awake, alert, NAD  Head:normal  Eye:normal  Neck:  No JVD   Chest:  Clear to auscultation, respiration easy  Cardiovascular:  S1 and S2 audible, systolic murmur, No signs of ankle edema, or volume overload, No evidence of JVD, No crackles  Abdomen:   nontender  Extremities:  No  edema  Pulses; palpable  Neuro: grossly normal      MEDICAL DECISION MAKING;    I agree with the above plan, which was planned by myself and discussed with NP.   Follow up for VHD ( conservative approach)   She did well  No signs of volume overload  Will sign off       Nadira Sosa MD [facility] 09/15/21

## 2021-09-15 NOTE — PROGRESS NOTES
Alzheimer's Disease; pt follows basic commands and converses appropriately but disoriented to time/place/situation, significant long/short term memory impairments~believed she was still a , poor overall insight/safety awareness)  · Affect: Normal     Balance:   · Sitting: SBA static sitting unsupported EOB, CGA with light dynamic sitting tasks  · Standing: Max A with RW (2 trials x 20-30 seconds each; poor trunk extension/upright posture, unable to achieve Rt knee extension)    Functional Mobility:  · Bed Mobility: Max A supine to sitting EOB (HOB elevated to 30', increased time/effort, mod coaching for scooting legs and use of bed rail)  · Transfers: Mod A x 2 sit to stand from bed, Max A stand pivot transfer bed to chair (max cues for technique/safe hand placement with each transfer)  · Ambulation: Unsafe/unable this date      AM-PAC 6 click short form for inpatient daily activity:   How much help from another person does the patient currently need. .. Unable  Dep A Lot  Max A A Lot   Mod A A Little  Min A A Little   CGA  SBA None   Mod I  Indep  Sup   1. Putting on and taking off regular lower body clothing? [x] 1    [] 2   [] 2   [] 3   [] 3   [] 4      2. Bathing (including washing, rinsing, drying)? [] 1   [x] 2   [] 2 [] 3 [] 3 [] 4   3. Toileting, which includes using toilet, bedpan, or urinal? [x] 1    [] 2   [] 2   [] 3   [] 3   [] 4     4. Putting on and taking off regular upper body clothing? [] 1   [] 2   [] 2   [] 3   [x] 3    [] 4      5. Taking care of personal grooming such as brushing teeth? [] 1   [] 2    [] 2 [] 3    [x] 3   [] 4      6. Eating meals? [] 1   [] 2   [] 2   [] 3   [x] 3   [] 4      Raw Score:  13     [24=0% impaired(CH), 23=1-19%(CI), 20-22=20-39%(CJ), 15-19=40-59%(CK), 10-14=60-79%(CL), 7-9=80-99%(CM), 6=100%(CN)]     Treatment:  Self Care Training:   Cues were given for safety, sequence, UE/LE placement, visual cues, and balance.     Activities performed today included UB/LB dressing tasks, grooming, facial hygiene, oral hygiene task of brushing dentures      Safety Measures: Gait belt used, Left in Chair, Alarm in place    Assessment:  Pt is a 80year old female with a past medical history of Aortic valvular disease, Asthma, B12 deficiency, COPD (chronic obstructive pulmonary disease) ([facility]), Dementia ([address] DVT (deep vein thrombosis) in pregnancy, H/O Doppler ABD Aortia ultrasound, H/O Doppler lower venous ultrasound, H/O echocardiogram, Hyperlipidemia, Hypertension, Hypothyroidism, Iron deficiency, Osteoarthritis, Peripheral neuropathy, Pulmonary nodule, Seasonal allergies, Spinal stenosis, and Type 2 diabetes mellitus without complication ([address] Pt admitted following a fall and diagnosed with a Rt hip femoral neck fracture. Pt underwent Rt hip hemiarthroplasty on 9-14. Pt is from the memory care unit at [facility], and unable to determine full PLOF this date, as pt was a poor historian. Pt currently presents with the above impairments, is a unquestionably below her recent baseline. Recommend continued OT services in SNF at discharge. Complexity: Moderate  Prognosis: Good, Fair  Plan: 3x/week      Goals:  1. Pt will complete log rolling at bed level for positioning needs min A  2. Pt will transfer supine to sitting EOB for EOB/OOB ADLs mod A  3. Pt will complete grooming/oral hygiene tasks in unsupported sitting EOB with supervision/setup  4. Pt will complete all functional sit to stand and stand pivot transfers to chair and BSC mod A  5. Pt will complete toileting task on BSC max A  6. Pt will tolerate sitting in chair for 2/3 meals per day to increase OOB activity tolerance  7.  Pt will complete ther ex/ther act with focus on UE strengthening, functional standing tolerance >4 minutes, cognitive re-orientation      Time:   Time in: 1021  Time out: 1048  Timed treatment minutes: 12  Total time: 27      Electronically signed by:    IGNACIA Watkins/FAUSTINO, MANJIT, [id number]

## 2021-09-16 ENCOUNTER — APPOINTMENT (OUTPATIENT)
Dept: GENERAL RADIOLOGY | Age: 86
DRG: 521 | End: 2021-09-16
Payer: MEDICARE

## 2021-09-16 LAB
ANION GAP SERPL CALCULATED.3IONS-SCNC: 15 MMOL/L (ref 4–16)
BASOPHILS ABSOLUTE: 0 K/CU MM
BASOPHILS RELATIVE PERCENT: 0.4 % (ref 0–1)
BUN BLDV-MCNC: 37 MG/DL (ref 6–23)
CALCIUM SERPL-MCNC: 8.2 MG/DL (ref 8.3–10.6)
CHLORIDE BLD-SCNC: 99 MMOL/L (ref 99–110)
CO2: 22 MMOL/L (ref 21–32)
CREAT SERPL-MCNC: 0.8 MG/DL (ref 0.6–1.1)
DIFFERENTIAL TYPE: ABNORMAL
EOSINOPHILS ABSOLUTE: 0.1 K/CU MM
EOSINOPHILS RELATIVE PERCENT: 0.6 % (ref 0–3)
GFR AFRICAN AMERICAN: >60 ML/MIN/1.73M2
GFR NON-AFRICAN AMERICAN: >60 ML/MIN/1.73M2
GLUCOSE BLD-MCNC: 142 MG/DL (ref 70–99)
HCT VFR BLD CALC: 25 % (ref 37–47)
HCT VFR BLD CALC: 27.5 % (ref 37–47)
HCT VFR BLD CALC: 27.5 % (ref 37–47)
HEMOGLOBIN: 7.6 GM/DL (ref 12.5–16)
HEMOGLOBIN: 8.2 GM/DL (ref 12.5–16)
HEMOGLOBIN: 8.2 GM/DL (ref 12.5–16)
IMMATURE NEUTROPHIL %: 0.8 % (ref 0–0.43)
LYMPHOCYTES ABSOLUTE: 0.6 K/CU MM
LYMPHOCYTES RELATIVE PERCENT: 5.2 % (ref 24–44)
MCH RBC QN AUTO: 26.4 PG (ref 27–31)
MCHC RBC AUTO-ENTMCNC: 29.8 % (ref 32–36)
MCV RBC AUTO: 88.4 FL (ref 78–100)
MONOCYTES ABSOLUTE: 0.9 K/CU MM
MONOCYTES RELATIVE PERCENT: 7.9 % (ref 0–4)
NUCLEATED RBC %: 0 %
PDW BLD-RTO: 15.8 % (ref 11.7–14.9)
PLATELET # BLD: 224 K/CU MM (ref 140–440)
PMV BLD AUTO: 11.5 FL (ref 7.5–11.1)
POTASSIUM SERPL-SCNC: 4.5 MMOL/L (ref 3.5–5.1)
PRO-BNP: ABNORMAL PG/ML
RBC # BLD: 3.11 M/CU MM (ref 4.2–5.4)
SEGMENTED NEUTROPHILS ABSOLUTE COUNT: 9.5 K/CU MM
SEGMENTED NEUTROPHILS RELATIVE PERCENT: 85.1 % (ref 36–66)
SODIUM BLD-SCNC: 136 MMOL/L (ref 135–145)
TOTAL IMMATURE NEUTOROPHIL: 0.09 K/CU MM
TOTAL NUCLEATED RBC: 0 K/CU MM
WBC # BLD: 11.2 K/CU MM (ref 4–10.5)

## 2021-09-16 PROCEDURE — 97530 THERAPEUTIC ACTIVITIES: CPT

## 2021-09-16 PROCEDURE — 36415 COLL VENOUS BLD VENIPUNCTURE: CPT

## 2021-09-16 PROCEDURE — 6370000000 HC RX 637 (ALT 250 FOR IP): Performed by: ORTHOPAEDIC SURGERY

## 2021-09-16 PROCEDURE — 2700000000 HC OXYGEN THERAPY PER DAY

## 2021-09-16 PROCEDURE — 85025 COMPLETE CBC W/AUTO DIFF WBC: CPT

## 2021-09-16 PROCEDURE — 6360000002 HC RX W HCPCS: Performed by: PHYSICIAN ASSISTANT

## 2021-09-16 PROCEDURE — 71045 X-RAY EXAM CHEST 1 VIEW: CPT

## 2021-09-16 PROCEDURE — 97110 THERAPEUTIC EXERCISES: CPT

## 2021-09-16 PROCEDURE — 2580000003 HC RX 258: Performed by: ORTHOPAEDIC SURGERY

## 2021-09-16 PROCEDURE — 85018 HEMOGLOBIN: CPT

## 2021-09-16 PROCEDURE — 94761 N-INVAS EAR/PLS OXIMETRY MLT: CPT

## 2021-09-16 PROCEDURE — 83880 ASSAY OF NATRIURETIC PEPTIDE: CPT

## 2021-09-16 PROCEDURE — 97535 SELF CARE MNGMENT TRAINING: CPT

## 2021-09-16 PROCEDURE — 80048 BASIC METABOLIC PNL TOTAL CA: CPT

## 2021-09-16 PROCEDURE — 1200000000 HC SEMI PRIVATE

## 2021-09-16 PROCEDURE — 85014 HEMATOCRIT: CPT

## 2021-09-16 RX ORDER — FUROSEMIDE 10 MG/ML
10 INJECTION INTRAMUSCULAR; INTRAVENOUS ONCE
Status: COMPLETED | OUTPATIENT
Start: 2021-09-16 | End: 2021-09-16

## 2021-09-16 RX ADMIN — SENNOSIDES AND DOCUSATE SODIUM 1 TABLET: 50; 8.6 TABLET ORAL at 21:56

## 2021-09-16 RX ADMIN — ACETAMINOPHEN 650 MG: 325 TABLET ORAL at 17:38

## 2021-09-16 RX ADMIN — ASPIRIN 325 MG: 325 TABLET, COATED ORAL at 09:29

## 2021-09-16 RX ADMIN — SENNOSIDES AND DOCUSATE SODIUM 1 TABLET: 50; 8.6 TABLET ORAL at 09:30

## 2021-09-16 RX ADMIN — PANTOPRAZOLE SODIUM 40 MG: 40 TABLET, DELAYED RELEASE ORAL at 05:46

## 2021-09-16 RX ADMIN — LEVOTHYROXINE SODIUM 75 MCG: 0.07 TABLET ORAL at 05:46

## 2021-09-16 RX ADMIN — SODIUM CHLORIDE, PRESERVATIVE FREE 10 ML: 5 INJECTION INTRAVENOUS at 09:42

## 2021-09-16 RX ADMIN — Medication 5 MG: at 21:56

## 2021-09-16 RX ADMIN — SODIUM CHLORIDE, PRESERVATIVE FREE 10 ML: 5 INJECTION INTRAVENOUS at 21:57

## 2021-09-16 RX ADMIN — FUROSEMIDE 10 MG: 10 INJECTION, SOLUTION INTRAVENOUS at 17:38

## 2021-09-16 RX ADMIN — BISACODYL 5 MG: 5 TABLET, COATED ORAL at 09:30

## 2021-09-16 RX ADMIN — TRAMADOL HYDROCHLORIDE 50 MG: 50 TABLET, FILM COATED ORAL at 09:29

## 2021-09-16 RX ADMIN — Medication 5000 UNITS: at 09:29

## 2021-09-16 RX ADMIN — ACETAMINOPHEN 650 MG: 325 TABLET ORAL at 05:46

## 2021-09-16 RX ADMIN — SERTRALINE HYDROCHLORIDE 50 MG: 50 TABLET ORAL at 09:30

## 2021-09-16 RX ADMIN — ACETAMINOPHEN 650 MG: 325 TABLET ORAL at 00:19

## 2021-09-16 RX ADMIN — ACETAMINOPHEN 650 MG: 325 TABLET ORAL at 23:48

## 2021-09-16 RX ADMIN — TRAMADOL HYDROCHLORIDE 50 MG: 50 TABLET, FILM COATED ORAL at 21:56

## 2021-09-16 RX ADMIN — ASPIRIN 325 MG: 325 TABLET, COATED ORAL at 21:57

## 2021-09-16 ASSESSMENT — PAIN SCALES - GENERAL
PAINLEVEL_OUTOF10: 0
PAINLEVEL_OUTOF10: 7
PAINLEVEL_OUTOF10: 7
PAINLEVEL_OUTOF10: 4
PAINLEVEL_OUTOF10: 0
PAINLEVEL_OUTOF10: 2
PAINLEVEL_OUTOF10: 3
PAINLEVEL_OUTOF10: 6
PAINLEVEL_OUTOF10: 0

## 2021-09-16 ASSESSMENT — PAIN DESCRIPTION - FREQUENCY: FREQUENCY: CONTINUOUS

## 2021-09-16 ASSESSMENT — PAIN DESCRIPTION - ORIENTATION
ORIENTATION: RIGHT
ORIENTATION: RIGHT

## 2021-09-16 ASSESSMENT — PAIN DESCRIPTION - DESCRIPTORS: DESCRIPTORS: ACHING

## 2021-09-16 ASSESSMENT — PAIN DESCRIPTION - LOCATION
LOCATION: HIP
LOCATION: HIP

## 2021-09-16 ASSESSMENT — PAIN DESCRIPTION - PAIN TYPE
TYPE: SURGICAL PAIN
TYPE: SURGICAL PAIN

## 2021-09-16 ASSESSMENT — PAIN DESCRIPTION - PROGRESSION: CLINICAL_PROGRESSION: NOT CHANGED

## 2021-09-16 ASSESSMENT — PAIN - FUNCTIONAL ASSESSMENT: PAIN_FUNCTIONAL_ASSESSMENT: PREVENTS OR INTERFERES SOME ACTIVE ACTIVITIES AND ADLS

## 2021-09-16 ASSESSMENT — PAIN DESCRIPTION - ONSET: ONSET: ON-GOING

## 2021-09-16 NOTE — PLAN OF CARE
Nutrition Problem #1: Severe malnutrition, In context of chronic illness  Intervention: Food and/or Nutrient Delivery: Continue Current Diet, Snacks (Comment), Start Oral Nutrition Supplement  Nutritional Goals: Pt will consume at least half of her meals and supplements

## 2021-09-16 NOTE — PROGRESS NOTES
Comprehensive Nutrition Assessment    Type and Reason for Visit:  Reassess    Nutrition Recommendations/Plan:   · Consider consult SLP for swallow eval if appropriate within plan of care  · Continue regular diet if appropriate   · Added high proteins snacks and supplements   · Please monitor and encourage po intakes dos    Nutrition Assessment:  Pt stated choking at visit, observed a few bites of pudding taken, provided pt with water at visit, addressed RN. Pt seems confused, reports \"I am dying, I need to get out of here. \" Unable to assess intake. Will send supplements as diet now has advanced to regular s/p hemiarthroplasty. Plese assist feed as able. High nutrition risk. Malnutrition Assessment:  Malnutrition Status:  Severe malnutrition    Context:  Chronic Illness       Estimated Daily Nutrient Needs:  Energy (kcal):  6304-3795 (30-35 kcals/kg); Weight Used for Energy Requirements:  Current     Protein (g):  57-68 (1-1.2 g/kg IBW); Weight Used for Protein Requirements:  Ideal        Fluid (ml/day):  1500; Method Used for Fluid Requirements:  1 ml/kcal      Nutrition Related Findings:  Na 134, H/H 7.4/34.7      Wounds:  Surgical Incision       Current Nutrition Therapies:    ADULT DIET;  Regular  Adult Oral Nutrition Supplement; Standard High Calorie/High Protein Oral Supplement  Adult Oral Nutrition Supplement; Frozen Oral Supplement    Anthropometric Measures:  · Height: 5' 5\" (165.1 cm)  · Current Body Weight: 118 lb 2.7 oz (53.6 kg)   · Admission Body Weight: 98 lb 5.2 oz (44.6 kg)    · Usual Body Weight: 93 lb 3.2 oz (42.3 kg) (9/14/20)     · Ideal Body Weight: 125 lbs; % Ideal Body Weight 78.7 %   · BMI: 19.7  · Adjusted Body Weight:  ; No Adjustment   · Adjusted BMI:      · BMI Categories: Underweight (BMI less than 22) age over 72       Nutrition Diagnosis:   · Severe malnutrition, In context of chronic illness related to inadequate protein-energy intake, catabolic illness, cognitive or neurological impairment (reduced appetite/intake) as evidenced by poor intake prior to admission, severe loss of subcutaneous fat, severe muscle loss    Nutrition Interventions:  Food and/or Nutrient Delivery:  Continue Current Diet, Snacks (Comment), Start Oral Nutrition Supplement  Nutrition Education/Counseling:  No recommendation at this time   Coordination of Nutrition Care:  Continue to monitor while inpatient, Coordination of Community Care, Feeding Assistance/Environment Change    Goals:  Pt will consume at least half of her meals and supplements       Nutrition Monitoring and Evaluation:   Behavioral-Environmental Outcomes:  None Identified   Food/Nutrient Intake Outcomes:  Food and Nutrient Intake, Supplement Intake  Physical Signs/Symptoms Outcomes:  Biochemical Data, GI Status, Chewing or Swallowing, Weight, Skin     Discharge Planning:     Too soon to determine     Electronically signed by Ingrid Rose, RD, LD on 9/16/21 at 10:28 AM EDT    Contact: [phone]

## 2021-09-16 NOTE — PROGRESS NOTES
Hospitalist Progress Note      Name:  Boris Rascon /Age/Sex: 1931  (80 y.o. female)   MRN & CSN:  [id number] & [id number] Admission Date/Time: 2021  8:17 AM   Location:  1110/1110-A PCP: No primary care provider on file.          Hospital Day: 4    Assessment and Plan:   Yasmine Goodwin a 80 y.o.  female  who presents with right hip fracture and hematoma.     Acute hypoxic respiratory failure  Acute on Chronic HFpEF  Severe valvular disease  -Requiring 1 L nasal cannula, O2 sat drops to 84% on room air  -Chest x-ray with bilateral small pleural effusions with bibasilar infiltrates and mild interstitial congestion  - echo 21 showed EF 58%, grade 3 diastolic dysfunction, severely dilated left atrium, severe aortic stenosis, severe mitral stenosis, severe tricuspid regurgitation  -trial low dose IV Lasix x1 and monitor response  -cardiology was following postoperatively and signed off, will re-consult if needed    Closed right hip fracture   Right hip hematoma  -Admit CT with fractured subcapital right femoral neck with adjacent 6 cm superficial soft tissue hematoma  -S/p R hip hemiarthroplasty 21  - DVT prophylaxis with ASA, continue for 14 days post-op   -Continue scheduled Tylenol, as needed Ultram    Postoperative hypotension   -Improving  - likely secondary to hypovolemia in setting of blood loss  -Continue to hold buspar, monitor for improvement     Postoperative anemia secondary to blood loss   - Hgb 10, eugenia 7.4, now stable 8.2  - no evidence of active bleeding, likely secondary to surgery   - monitor closely as patient was on Xarelto prior to surgery  - monitor H&H, type and screen, transfuse Hgb <7     History of recurrent DVT  -Remote history  -On Xarelto, but held in setting of hematoma and surgery   -Venous Doppler without DVT  -Spoke with POA and discussed risks versus benefits of anticoagulation and they would like to continue to hold anticoagulation indefinitely after discharge until discussing with patient's primary care provider      Severe malnutrition  -Admit BMI 19, cachectic  -dietitian following     Osteoporosis  -Recommend repeat bone density scan as outpatient  -Continue home vitamin D     Fall  History of dementia  -Alert and oriented to self only  -On fall precautions and strict bedrest  -planning to return to [facility] for skilled nursing      T2DM  -Hemoglobin A1c 5.3  -Monitor blood glucose     This patient was seen and examined autonomously.  I discussed and examined this patient with Dr. Fan Harrington today and he was agreeable with management. Diet ADULT DIET; Regular  Adult Oral Nutrition Supplement; Standard High Calorie/High Protein Oral Supplement  Adult Oral Nutrition Supplement; Frozen Oral Supplement   DVT Prophylaxis [] Lovenox, []  Heparin, [x] SCDs, [x] Ambulation   GI Prophylaxis [] PPI,  [] H2 Blocker,  [] Carafate,  [x] Diet/Tube Feeds   Code Status DNR-CCA   Disposition Patient requires continued admission due to hypoxia. Hopefully discharge tomorrow if improving. Planning to discharge to [facility] hopefully 9/17/21         History of Present Illness:     Chief Complaint: Michelle Santizo is a 80 y.o.  female  who presents with fall. Patient seen and examined at bedside. She is complaining of right hip pain and right leg pain all the way down to her foot. She denies pain elsewhere. She is a poor historian in the setting of her dementia. Ten point ROS reviewed negative, unless as noted above    Objective: Intake/Output Summary (Last 24 hours) at 9/16/2021 1547  Last data filed at 9/16/2021 0550  Gross per 24 hour   Intake 1096.13 ml   Output    Net 1096.13 ml      Vitals:   Vitals:    09/16/21 1243   BP: (!) 94/53   Pulse: 84   Resp: 18   Temp: 98.1 °F (36.7 °C)   SpO2: (!) 88%     Physical Exam:     GEN Awake female, sitting upright in bed in no apparent distress. Appears given age. Cachectic  EYES Pupils are equally round.   No scleral

## 2021-09-16 NOTE — PROGRESS NOTES
Physical Therapy    Physical Therapy Treatment Note  Name: Florence Garcia MRN: [id number] :   1931   Date:  2021   Admission Date: 2021 Room:  [address] [place], [place] [address]A   Restrictions/Precautions:        R hip hemiarthroplasty on 2021  Communication with other providers:  Per nurse ok to tx and has had pain meds  Subjective:  Patient states:  Pt is pleasant and agreeable to tx. Pt states \" how the hell do I know\" when asked orientation questions. Pain:   Location, Type, Intensity (0/10 to 10/10):  Pt c/o pain with mobility and ex but unable to rate. Objective:    Observation:  Alert but confused. Pt works best with simple commands. Treatment, including education/measures:  Pt educated on hip precautions at start of tx. Sup to sit with max assist and cues with HOB up and using bed rail. With bilateral rails up pt was able to transfer sit to stand with max assist and cues and maintain standing with mod assist for 30 secs. Progressed to sit to standing at walker with max assist but also needing max assist to maintain standing for 10 sec. SPT/HUG transfer bed to chair. Chair turned to face bed rail and transferred sit<=>stand x 3 reps with max assist using bed rails. Pt was incontinent of urine and during last stand with nurse assist was able to change depends and pad and dependent for destinee care. Ex in sitting; pt needs verbal and tactile cues for leg ex. Trunk stretches with shoulder flex and cues for deep breathing  10 reps aps   10 reps laqs   Assessment / Impression:       Patient's tolerance of treatment:  good   Adverse Reaction: na  Significant change in status and impact:  na  Barriers to improvement:  Confusion, strength, and safety  Plan for Next Session:    Cont.  POC  Time in:  1110  Time out:  1205  Timed treatment minutes:  55  Total treatment time:  55    Previously filed items:     Short term goals  Time Frame for Short term goals: 1 week  Short term goal 1: Pt to complete all bed mobility mod A x1  Short term goal 2: Pt to complete all STS transfers to/from bed, commode, and chair mod A x1  Short term goal 3: Pt to complete stand pivot with LRAD mod A x1  Short term goal 4: Pt to ambulate 13' with LRAD mod A       Electronically signed by:    Brittney Alaniz PTA  9/16/2021, 8:14 AM

## 2021-09-16 NOTE — PLAN OF CARE
Problem: Pain:  Goal: Pain level will decrease  Description: Pain level will decrease  Outcome: Ongoing  Goal: Control of acute pain  Description: Control of acute pain  Outcome: Ongoing  Goal: Control of chronic pain  Description: Control of chronic pain  Outcome: Ongoing     Problem: Nutrition  Goal: Optimal nutrition therapy  Outcome: Ongoing     Problem: Skin Integrity:  Goal: Will show no infection signs and symptoms  Description: Will show no infection signs and symptoms  Outcome: Ongoing  Goal: Absence of new skin breakdown  Description: Absence of new skin breakdown  Outcome: Ongoing     Problem: Falls - Risk of:  Goal: Will remain free from falls  Description: Will remain free from falls  Outcome: Ongoing  Goal: Absence of physical injury  Description: Absence of physical injury  Outcome: Ongoing

## 2021-09-16 NOTE — ANESTHESIA POSTPROCEDURE EVALUATION
Department of Anesthesiology  Postprocedure Note    Patient: Boris Rascon  MRN: [id number]  YOB: 1931  Date of evaluation: 9/16/2021  Time:  7:55 PM     Procedure Summary     Date: 09/14/21 Room / Location: [address]    Anesthesia Start: 8925 Anesthesia Stop: 1645    Procedure: RIGHT HIP HEMIARTHROPLASTY (Right Hip) Diagnosis: (-)    Surgeons: Alivia Bruner DO Responsible Provider: Ermias Jones MD    Anesthesia Type: general ASA Status: 4          Anesthesia Type: general    Portia Phase I: Portia Score: 9    Portia Phase II:      Last vitals: Reviewed and per EMR flowsheets.        Anesthesia Post Evaluation    Patient location during evaluation: PACU  Patient participation: complete - patient participated  Level of consciousness: awake and alert  Pain score: 2  Airway patency: patent  Nausea & Vomiting: no nausea and no vomiting  Complications: no  Cardiovascular status: blood pressure returned to baseline  Respiratory status: acceptable  Hydration status: euvolemic

## 2021-09-16 NOTE — PROGRESS NOTES
Physician Progress Note      Ana Rosa Zendejas  CSN #:                  [id number]  :                       1931  ADMIT DATE:       2021 8:17 AM  DISCH DATE:  RESPONDING  PROVIDER #:        Sherry Gibson          QUERY TEXT:    Pt admitted with R hip fracture and has post-op anemia documented. If   possible, please document in progress notes and discharge summary further   specificity regarding the acuity and type of anemia:    The medical record reflects the following:  Risk Factors: Recent R hip replacement, Xarelto on home med list  Clinical Indicators:  Hgb is 10.7, after surgery on  Hgb was 10.0, and   has dropped to 7.7 on 9/15. EBL per OP note was 150mL. 9/15 progress note by   DULCE MARIA Randall indicates pt has post-operative anemia. Treatment: PRBC transfusion if Hgb drops below 7.0, monitor H/H    Thank you,  Lyric Dior, RN  [phone]  Options provided:  -- Post-op anemia due to acute blood loss  -- Post-op anemia due to acute on chronic blood loss  -- Other - I will add my own diagnosis  -- Disagree - Not applicable / Not valid  -- Disagree - Clinically unable to determine / Unknown  -- Refer to Clinical Documentation Reviewer    PROVIDER RESPONSE TEXT:    This patient has post-op acute blood loss anemia. Query created by: Alysha Okeefe on 9/15/2021 3:18 PM      QUERY TEXT:    Pt admitted with R hip fracture and has malnutrition documented. Please   further specify type of malnutrition with documentation in the medical record. The medical record reflects the following:  Risk Factors: advanced age, dementia  Clinical Indicators: Pt is described by providers throughout chart as having a   cachectic appearance. In progress note dated 9/15, by DULCE MARIA Randall,   malnutrition is documented without specificity pending dietary consult.  Please   see dietary assessment below  Malnutrition Assessment:  Malnutrition Status:  Severe malnutrition  Context:  Chronic Illness  Findings of the 6 clinical characteristics of malnutrition:  Energy Intake:  7 - 75% or less estimated energy requirements for 1 month or   longer  Weight Loss:  No significant weight loss  Body Fat Loss:  7 - Severe body fat loss Orbital, Triceps  Muscle Mass Loss:  7 - Severe muscle mass loss Temples (temporalis), Clavicles   (pectoralis & deltoids), Hand (interosseous), Scapula (trapezius), Thigh   (quadraceps)  Fluid Accumulation:  No significant fluid accumulation Extremities  BMI is 17.8  Treatment: Start oral diet when able, please add [place] yogurt. ASPEN Criteria:    https://aspenjournals. onlinelibrary. maier. com/doi/full/10.1177/280948360246241  5    Thank you,  Kei Dolan RN  [phone]  Options provided:  -- Severe Malnutrition  -- Other - I will add my own diagnosis  -- Disagree - Not applicable / Not valid  -- Disagree - Clinically unable to determine / Unknown  -- Refer to Clinical Documentation Reviewer    PROVIDER RESPONSE TEXT:    This patient has severe malnutrition. Query created by: Marilynn Crawford on 9/15/2021 3:26 PM      QUERY TEXT:    Pt admitted with R hip fracture. Pt noted to have osteoporosis. If possible,   please document in progress notes and discharge summary if you are evaluating   and/or treating any of the following: The medical record reflects the following:  Risk Factors: osteoporosis, advanced age  Clinical Indicators: Per H&P, pt resides in dementia unit and c/o pain to R   hip per staff. She is unable to bear weight and has a bruise to her R hip. She   does not recall falling and denies syncopal episodes. 9/13 CT PELVIS:   Fractured sub-capital right femoral neck. Pt has osteoporosis per PMH.   Treatment: Vitamin D Chronically as an outpatient    Thank you,  Kei Dolan RN  [phone]  Options provided:  -- Pathological R hip fracture  -- Osteoporotic R hip Fracture  -- Osteoporotic R hip fracture following fall which would not usually break a   normal, healthy bone  -- Traumatic R hip fracture  -- Other - I will add my own diagnosis  -- Disagree - Not applicable / Not valid  -- Disagree - Clinically unable to determine / Unknown  -- Refer to Clinical Documentation Reviewer    PROVIDER RESPONSE TEXT:    This patient has a traumatic fracture of R hip.     Query created by: Tabatha Macias on 9/15/2021 3:42 PM      Electronically signed by:  Teodoro Santillan 9/16/2021 12:53 PM

## 2021-09-16 NOTE — PROGRESS NOTES
Occupational Therapy      Occupational Therapy Treatment Note      Name: Maricel Allen MRN: [id number] :   1931   Date:  2021   Admission Date: 2021 Room:  [address] [place] [address]-A     Primary Problem: Rt hip femoral neck fracture s/p Rt hip hemiarthroplasty    Restrictions/Precautions: General Precautions, Fall Risk, WBAT Rt LE, Anterolateral Hip Precautions, IV, 2L o2    Communication with other providers: BETTE Osborne, Shyam Wu    Subjective:  Patient states: \"It hurts from the bottom of my leg up to my groin. \"  Pain: Pt reported significant pain in Rt hip but cognitively unable to quantify     Objective:    Observation: Pt received in supine upon OT arrival. Pleasantly confused and agreeable to treatment. Voicing Rt hip pain, but was unaware she had surgery. Objective Measures: Disoriented to time/place/situation    Treatment, including education:  Therapeutic Activity Training:   Therapeutic activity training was instructed today. Cues were given for safety, sequence, UE/LE placement, awareness, and balance. Self Care Training:   Cues were given for safety, sequence, UE/LE placement, visual cues, and balance. Pt received in supine upon OT arrival. Pt re-educated on role of OT, POC, WBAT status Rt LE, anterolateral hip precautions, and importance of EOB/OOB activity. Pt transferred supine to sitting EOB max A with HOB elevated to 40'. Pt required significantly extended time for task completion, as pt very painful throughout. Pt able to sit at EOB level for 20+ minutes at SBA level. Pt required intermittent cueing for maintaining midline orientation due to Rt hip pain. Pt completed grooming task of brushing hair, facial hygiene, and oral hygiene task of rinsing with mouthwash SBA with setup and mod cues for task initiation. Pt dependent for donning BL socks. Pt declined attempting to stand this date due to pain.  Pt transferred sitting EOB to supine max A, and was dependent x 2 for scooting hips up in bed. Pt's caregiver called, and caregiver provided extensive education on role of OT, POC, WBAT status, anterolateral hip precautions, pt's progress with OT, and d/c planning/recommendations. Pt left positioned for comfort in bed with all lines intact, all needs within reach, and bed alarm on. Assessment / Impression:    Patient's tolerance of treatment: Fair  Adverse Reaction: Pain  Significant change in status and impact: Did not tolerate attempting to stand this date  Barriers to improvement: Significant dementia      Plan for Next Session:    Continue per OT POC. Continue to recommend SNF at discharge.       Time in: 1025  Time out: 1103  Timed treatment minutes: 38  Total treatment time: 38      Electronically signed by:    IGNACIA Napier/L, [address] [place] [id number]

## 2021-09-17 VITALS
SYSTOLIC BLOOD PRESSURE: 116 MMHG | TEMPERATURE: 97.7 F | RESPIRATION RATE: 18 BRPM | DIASTOLIC BLOOD PRESSURE: 68 MMHG | HEIGHT: 65 IN | HEART RATE: 80 BPM | WEIGHT: 120.81 LBS | OXYGEN SATURATION: 93 % | BODY MASS INDEX: 20.13 KG/M2

## 2021-09-17 LAB
ANION GAP SERPL CALCULATED.3IONS-SCNC: 13 MMOL/L (ref 4–16)
BANDED NEUTROPHILS ABSOLUTE COUNT: 0.18 K/CU MM
BANDED NEUTROPHILS RELATIVE PERCENT: 2 % (ref 5–11)
BUN BLDV-MCNC: 41 MG/DL (ref 6–23)
CALCIUM SERPL-MCNC: 8.3 MG/DL (ref 8.3–10.6)
CHLORIDE BLD-SCNC: 101 MMOL/L (ref 99–110)
CO2: 25 MMOL/L (ref 21–32)
CREAT SERPL-MCNC: 0.8 MG/DL (ref 0.6–1.1)
DIFFERENTIAL TYPE: ABNORMAL
EOSINOPHILS ABSOLUTE: 0.1 K/CU MM
EOSINOPHILS RELATIVE PERCENT: 1 % (ref 0–3)
GFR AFRICAN AMERICAN: >60 ML/MIN/1.73M2
GFR NON-AFRICAN AMERICAN: >60 ML/MIN/1.73M2
GLUCOSE BLD-MCNC: 117 MG/DL (ref 70–99)
HCT VFR BLD CALC: 24.8 % (ref 37–47)
HCT VFR BLD CALC: 26.3 % (ref 37–47)
HEMOGLOBIN: 7.5 GM/DL (ref 12.5–16)
HEMOGLOBIN: 8 GM/DL (ref 12.5–16)
LYMPHOCYTES ABSOLUTE: 0.4 K/CU MM
LYMPHOCYTES RELATIVE PERCENT: 5 % (ref 24–44)
MCH RBC QN AUTO: 26.8 PG (ref 27–31)
MCHC RBC AUTO-ENTMCNC: 30.4 % (ref 32–36)
MCV RBC AUTO: 88.3 FL (ref 78–100)
MONOCYTES ABSOLUTE: 0.5 K/CU MM
MONOCYTES RELATIVE PERCENT: 6 % (ref 0–4)
PDW BLD-RTO: 15.9 % (ref 11.7–14.9)
PLATELET # BLD: 195 K/CU MM (ref 140–440)
PLT MORPHOLOGY: ABNORMAL
PMV BLD AUTO: 11.5 FL (ref 7.5–11.1)
POLYCHROMASIA: ABNORMAL
POTASSIUM SERPL-SCNC: 4.3 MMOL/L (ref 3.5–5.1)
RBC # BLD: 2.98 M/CU MM (ref 4.2–5.4)
SARS-COV-2, NAAT: NOT DETECTED
SEGMENTED NEUTROPHILS ABSOLUTE COUNT: 7.6 K/CU MM
SEGMENTED NEUTROPHILS RELATIVE PERCENT: 86 % (ref 36–66)
SODIUM BLD-SCNC: 139 MMOL/L (ref 135–145)
WBC # BLD: 8.8 K/CU MM (ref 4–10.5)

## 2021-09-17 PROCEDURE — 36415 COLL VENOUS BLD VENIPUNCTURE: CPT

## 2021-09-17 PROCEDURE — 87635 SARS-COV-2 COVID-19 AMP PRB: CPT

## 2021-09-17 PROCEDURE — 94761 N-INVAS EAR/PLS OXIMETRY MLT: CPT

## 2021-09-17 PROCEDURE — 97530 THERAPEUTIC ACTIVITIES: CPT

## 2021-09-17 PROCEDURE — 85027 COMPLETE CBC AUTOMATED: CPT

## 2021-09-17 PROCEDURE — 6370000000 HC RX 637 (ALT 250 FOR IP): Performed by: ORTHOPAEDIC SURGERY

## 2021-09-17 PROCEDURE — 85018 HEMOGLOBIN: CPT

## 2021-09-17 PROCEDURE — 97535 SELF CARE MNGMENT TRAINING: CPT

## 2021-09-17 PROCEDURE — 80048 BASIC METABOLIC PNL TOTAL CA: CPT

## 2021-09-17 PROCEDURE — 85007 BL SMEAR W/DIFF WBC COUNT: CPT

## 2021-09-17 PROCEDURE — 85014 HEMATOCRIT: CPT

## 2021-09-17 PROCEDURE — 2580000003 HC RX 258: Performed by: ORTHOPAEDIC SURGERY

## 2021-09-17 RX ORDER — TRAMADOL HYDROCHLORIDE 50 MG/1
50 TABLET ORAL EVERY 6 HOURS PRN
Qty: 28 TABLET | Refills: 0 | Status: SHIPPED | OUTPATIENT
Start: 2021-09-17 | End: 2021-09-24

## 2021-09-17 RX ADMIN — TRAMADOL HYDROCHLORIDE 50 MG: 50 TABLET, FILM COATED ORAL at 06:44

## 2021-09-17 RX ADMIN — TRAMADOL HYDROCHLORIDE 50 MG: 50 TABLET, FILM COATED ORAL at 14:56

## 2021-09-17 RX ADMIN — PANTOPRAZOLE SODIUM 40 MG: 40 TABLET, DELAYED RELEASE ORAL at 05:47

## 2021-09-17 RX ADMIN — ASPIRIN 325 MG: 325 TABLET, COATED ORAL at 09:02

## 2021-09-17 RX ADMIN — Medication 5000 UNITS: at 09:02

## 2021-09-17 RX ADMIN — BISACODYL 5 MG: 5 TABLET, COATED ORAL at 09:02

## 2021-09-17 RX ADMIN — SERTRALINE HYDROCHLORIDE 50 MG: 50 TABLET ORAL at 09:05

## 2021-09-17 RX ADMIN — LEVOTHYROXINE SODIUM 75 MCG: 0.07 TABLET ORAL at 05:47

## 2021-09-17 RX ADMIN — SENNOSIDES AND DOCUSATE SODIUM 1 TABLET: 50; 8.6 TABLET ORAL at 09:02

## 2021-09-17 RX ADMIN — SODIUM CHLORIDE, PRESERVATIVE FREE 10 ML: 5 INJECTION INTRAVENOUS at 09:05

## 2021-09-17 RX ADMIN — ACETAMINOPHEN 650 MG: 325 TABLET ORAL at 05:47

## 2021-09-17 ASSESSMENT — PAIN SCALES - GENERAL
PAINLEVEL_OUTOF10: 5
PAINLEVEL_OUTOF10: 0
PAINLEVEL_OUTOF10: 6
PAINLEVEL_OUTOF10: 4

## 2021-09-17 NOTE — DISCHARGE INSTR - COC
Continuity of Care Form    Patient Name: Michelle Santizo   :  1931  MRN:  [id number]    Admit date:  2021  Discharge date:  2021    Code Status Order: DNR-CCA   Advance Directives:      Admitting Physician:  Rudy Buckley MD  PCP: No primary care provider on file.     Discharging Nurse: [facility] Unit/Room#: 1110/1110-A  Discharging Unit Phone Number: [phone]    Emergency Contact:   Extended Emergency Contact Information  Primary Emergency Contact: Keyanna Katz [address] Phone: [phone]  Work Phone: [phone]  Relation: Other    Past Surgical History:  Past Surgical History:   Procedure Laterality Date    APPENDECTOMY     [facility] [place] [address]    had teeth pulled    EYE SURGERY      HIP SURGERY Right 2021    RIGHT HIP HEMIARTHROPLASTY performed by Kiara Borges DO at [facility]    lymph gland removed from neck    NOSE SURGERY      broken nose    ROTATOR CUFF REPAIR Left     surgery    KAREN AND BSO  1971    [facility] [place] [facility]    rem[facility] 3 ribs    TONSILLECTOMY AND ADENOIDECTOMY         Immunization History:   Immunization History   Administered Date(s) Administered    COVID-19, [employer], PF, 30mcg/0.3mL 2021, 2021    Influenza A (N8Z0-96) Vaccine PF IM 2010    Influenza Vaccine, unspecified formulation 2013, 2014, 2018    Influenza Virus Vaccine 2018    Influenza, High Dose (Fluzone 65 yrs and older) 2016, 10/29/2018    Influenza, Quadv, adjuvanted, 65 yrs +, IM, PF (Fluad) 2020    Pneumococcal Conjugate 13-valent (Nadeem Cords) 2016, 2016    Pneumococcal Polysaccharide ([id number]) 2017       Active Problems:  Patient Active Problem List   Diagnosis Code    Age-related osteoporosis without current pathological fracture M81.0    History of recurrent deep vein thrombosis (DVT) Z86.718    Restrictive lung disease J98.4    MCI (mild cognitive impairment) G31.84    Chronic anticoagulation Z79.01    Physical deconditioning R53.81    Chronic venous stasis dermatitis I87.2    Nonrheumatic aortic valve stenosis I35.0    PAD (peripheral artery disease) (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) I77.1    LVH (left ventricular hypertrophy) I51.7    Forgetfulness R68.89    Paranoia (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) E11.51    [facility] S72.001A       Isolation/Infection:   Isolation            No Isolation          Patient Infection Status       None to display            Nurse Assessment:  Last Vital Signs: /68   Pulse 80   Temp 97.7 °F (36.5 °C) (Oral)   Resp 18   Ht 5' 5\" (1.651 m)   Wt 120 lb 13 oz (54.8 kg)   SpO2 93%   BMI 20.10 kg/m²     Last documented pain score (0-10 scale): Pain Level: 5  Last Weight:   Wt Readings from Last 1 Encounters:   09/17/21 120 lb 13 oz (54.8 kg)     Mental Status:  disoriented    IV Access:  - None    Nursing Mobility/ADLs:  Walking   Dependent  Transfer  Dependent  Bathing  Dependent  Dressing  Dependent  Toileting  Dependent  Feeding  Assisted  Med Admin  Assisted  Med Delivery   whole and applesauce    Wound Care Documentation and Therapy:        Elimination:  Continence:   · Bowel:  Yes  · Bladder: incontinent at times  Urinary Catheter: None   Colostomy/Ileostomy/Ileal Conduit: No       Date of Last BM: Unknown    Intake/Output Summary (Last 24 hours) at 9/17/2021 0926  Last data filed at 9/16/2021 2151  Gross per 24 hour   Intake 10 ml   Output  Net 10 ml     I/O last 3 completed shifts: In: 10 [I.V.:10]  Out: -     Safety Concerns: At Risk for Falls    Impairments/Disabilities:      Vision and Hearing    Nutrition Therapy:  Current Nutrition Therapy:   - Oral Diet:  General    Routes of Feeding: Oral  Liquids: No Restrictions  Daily Fluid Restriction: no  Last Modified Barium Swallow with Video (Video Swallowing Test): not done    Treatments at the Time of Hospital Discharge:   Respiratory Treatments: ***  Oxygen Therapy:  {Therapy; copd oxygen:72509:::0}  Ventilator:    {[facility] Vent List:035210364:::0}    Rehab Therapies: {THERAPEUTIC INTERVENTION:4464536682}  Weight Bearing Status/Restrictions: {[facility] Weight Bearin:::0}  Other Medical Equipment (for information only, NOT a DME order):  {EQUIPMENT:463647109}  Other Treatments: ***    Patient's personal belongings (please select all that are sent with patient):  {[facility] DME Belongings:646677727:::0}    RN SIGNATURE:  {Esignature:[id number]:::0}    CASE MANAGEMENT/SOCIAL WORK SECTION    Inpatient Status Date: ***    Readmission Risk Assessment Score:  Readmission Risk              Risk of Unplanned Readmission:  15           Discharging to Facility/ Agency   · Name:   · Address:  · Phone:  · Fax:    Dialysis Facility (if applicable)   · Name:  · Address:  · Dialysis Schedule:  · Phone:  · Fax:    / signature: {Esignature:[id number]:::0}    PHYSICIAN SECTION    Prognosis: Fair    Condition at Discharge: Stable    Rehab Potential (if transferring to Rehab): Fair    Recommended Labs or Other Treatments After Discharge: BMP,CBC in 3 days    Physician Certification: I certify the above information and transfer of Linda Moses  is necessary for the continuing treatment of the diagnosis listed and that she requires [place] for less 30 days.      Update Admission H&P: No change in H&P    PHYSICIAN SIGNATURE:  Electronically signed by Leo Anglin PA-C on 21 at 9:28 AM EDT

## 2021-09-17 NOTE — CARE COORDINATION
LSW noted Pt has a discharge for today. Transport arranged through [facility] for a 7:00  time. RN and SNF notified. Packet prepare.

## 2021-09-17 NOTE — PROGRESS NOTES
Jose Kothari is a 80 y.o. female patient. 1. Closed fracture of neck of right femur, initial encounter ([facility]    2. Closed right hip fracture, initial encounter [facility])      Past Medical History:   Diagnosis Date    Aortic valvular disease     Asthma     B12 deficiency     COPD (chronic obstructive pulmonary disease) (HCC)     Dementia (HCC)     DVT (deep vein thrombosis) in pregnancy     H/O Doppler ABD [place] ultrasound 06/05/2019    NO evidence of AAA within the visualized portions of the abdominal aorta, possible right iliac artery stenosis    H/O Doppler lower venous ultrasound 06/05/2019    NO DVT OR SVT, No significant reflux in right, Significant reflux in Left CFV    H/O echocardiogram 06/05/2019    EF 45%, Moderate basal anteroseptal wall asymmetrical left ventricular hypertrophy, Left atrium mild to mod dilated, Mild to Mod AS, Mitral stenosis is severe, Mod AR and Mod Pulmonic regurg, Mild MR, TR, Mod Pulm HTN    Hyperlipidemia     Hypertension     Hypothyroidism     Iron deficiency     Osteoarthritis     Peripheral neuropathy     on EMG    Pulmonary nodule     thought benign    Seasonal allergies     Spinal stenosis     Type 2 diabetes mellitus without complication ([facility]      No past surgical history pertinent negatives on file.   Scheduled Meds:   sodium chloride flush  10 mL IntraVENous 2 times per day    sennosides-docusate sodium  1 tablet Oral BID    aspirin  325 mg Oral BID    lidocaine  5 mL IntraDERmal Once    sodium chloride flush  5-40 mL IntraVENous 2 times per day    acetaminophen  650 mg Oral Q6H    bisacodyl  5 mg Oral Daily    melatonin  5 mg Oral Nightly    sertraline  50 mg Oral Daily    levothyroxine  75 mcg Oral Daily    vitamin D  5,000 Units Oral Daily    pantoprazole  40 mg Oral QAM AC     Continuous Infusions:   sodium chloride      sodium chloride       PRN Meds:sodium chloride flush, sodium chloride, acetaminophen, magnesium hydroxide, dextrose, sodium chloride flush, sodium chloride, ondansetron **OR** ondansetron, polyethylene glycol, traMADol **OR** traMADol    Allergies   Allergen Reactions    Iodine Shortness Of Breath    Bee Pollen Swelling    Tetanus Toxoids Itching    Penicillins Rash     Active Problems:    History of recurrent deep vein thrombosis (DVT)    Chronic anticoagulation    PAD (peripheral artery disease) ([facility])    Rheumatic mitral stenosis    LVH (left ventricular hypertrophy)    Closed right hip fracture, initial encounter ([facility])  Resolved Problems:    * No resolved hospital problems. *    Blood pressure 116/68, pulse 80, temperature 97.7 °F (36.5 °C), temperature source Oral, resp. rate 18, height 5' 5\" (1.651 m), weight 120 lb 13 oz (54.8 kg), SpO2 93 %. Subjective   Patient seen and examined, resting in chair comfortably, pain controlled, no new complaints. Continued confusion and dementia and continues to complain of some pain in the right hip. Objective:  Vital signs (most recent): Blood pressure 116/68, pulse 80, temperature 97.7 °F (36.5 °C), temperature source Oral, resp. rate 18, height 5' 5\" (1.651 m), weight 120 lb 13 oz (54.8 kg), SpO2 93 %. RLE - Dressing removed, incision clean, dry, intact, no calf TTP, compartments soft, NVID  Mild pain in the right hip with range of motion. Assessment & Plan  POD #2 R Hip edward, Doing well postoperatively    Continue weight bearing as tolerated.   Continue range of motion exercises  Pain control  DVT prophylaxis  Continue PT/OT  Discharge planning  Ortho stable for discharge, follow up in office in 3 weeks    Vance Nathan DO  9/17/2021

## 2021-09-17 NOTE — PROGRESS NOTES
Occupational Therapy  . Occupational Therapy Treatment Note      Name: Boris Rascon MRN: [id number] :   1931   Date:  2021   Admission Date: 2021 Room:  [address] [place] [address]-A     Primary Problem:  Rt hip femoral neck fracture s/p Rt hip hemiarthroplasty    Restrictions/Precautions:    RLE anterior hip WBAT General precautions, fall risk        Communication with other providers:  Per chart review ok . cotx with PTA for safety and endurance, updated nurse on muscle tightness in R LE    Subjective:  Patient states: They were beating the crap out of me   Pain: no rating but stated painful during movement.  (location, type, intensity)    Objective:    Observation:  Patient supine but with R LE in figure 4 position. Very fearful and upsett upon entering. clinical staff in room attempting to change patient. Once repport  was established patient more alert and verbose. Patient very stiff and muscles were tight in RLE. Required massage to loosen muscles in order to straighten RLE. Urine incontinence upon each stand. Objective Measures:  Pocket tele    Treatment, including education:    ADL activity training was instructed today. Cues were given for safety, sequence, UE/LE placement, visual cues, and balance. Activities performed today included , toileting, hand hygiene,    Facial hygiene SET UP with max encouragement to complete. Oral care- DEP for denture care, but SBA for mouth wash   toileting- DEP. Patient with urine incontinence x3 upon each stand. Changed depends each time in stand. Therapeutic activity training was instructed today. Cues were given for safety, sequence, UE/LE placement, awareness, and balance. Activities performed today included bed mobility training, sup-sit, sit-stand, SPT. See pta note for additional details. massage R LE required  before stretching leg out d/t muscle tightness. Patient c/o of pain over entire RLE. Supine to eob- Max x2 with extra time and effort. Patient fearful at first.  patient sat EOB <15 min  Mostly unsupported with both bed rails up for support. Mod A initially progressed to SBA. patient completed stand to bed rails  Max x2 with urine incontinence. SPT using therapy hug to recliner with urine incontinence during transfer. xMax x2    Patient educated on role of OT , benefits of OT and rationale for therapeutic intervention. All therapeutic intervention performed c emphasis on dynamic balance / standing tolerance to increase strength, endurance and activity tolerance for increased [place] c ADL tasks and func transfers / mobility. Patient left safely in bedside chair at end of session, with call light in reach, alarm on and nursing aware. Gait belt was used for func transfers / mobility.        Assessment / Impression:    Patient's tolerance of treatment: fair  Adverse Reaction: none  Significant change in status and impact:  none  Barriers to improvement: confusion, weakness, pain      Plan for Next Session:    Continue with OT POC      Time in:  930  Time out:  1028  Timed treatment minutes:  58  Total treatment time:  58      Electronically signed by:    ARIC Dennis COTA/FAUSTINO [id number]  9/17/2021, 10:18 AM

## 2021-09-17 NOTE — DISCHARGE SUMMARY
Discharge Summary    Name:  Pippa Dhillon /Age/Sex: 1931  (80 y.o. female)   MRN & CSN:  [id number] & [id number] Admission Date/Time: 2021  8:17 AM   Attending:  Komal Worthy MD Discharging Physician: Dominic [facility] Course:   Pippa Dhillon is a 80 y.o.  female  who presents with fall, right hip fracture. Presented to the emergency department for a fall at her facility. She has a history of severe Alzheimer's dementia and resides in a memory care unit. She was found to have a right hip fracture with left tissue hematoma. She is status post right hip hemiarthroplasty 2021. Following surgery, she had mild postoperative anemia not requiring blood transfusion. Also had some mild postoperative hypotension that resolved with IV fluids. On 2021 she was noted to be hypoxic on room air. A chest x-ray was done which was consistent with volume overload. She was given 1 dose of IV Lasix with resolution of her hypoxia. She was discharged to [facility] skilled nursing facility.     Problems addressed during his hospitalization:    Acute hypoxic respiratory failure - resolved  Acute on Chronic HFpEF  Severe valvular disease  -Chest x-ray with bilateral small pleural effusions with bibasilar infiltrates and mild interstitial congestion  - echo 21 showed EF 02%, grade 3 diastolic dysfunction, severely dilated left atrium, severe aortic stenosis, severe mitral stenosis, severe tricuspid regurgitation  - resolved with IV lasix x1  - recommend outpatient cardiology follow-up   - resume home low dose lasix on discharge     Closed right hip fracture   Right hip hematoma  -Admit CT with fractured subcapital right femoral neck with adjacent 6 cm superficial soft tissue hematoma  -S/p R hip hemiarthroplasty 21  - DVT prophylaxis with ASA, continue for 14 days post-op   -Continue Tylenol, as needed Ultram, script printed for SNF    Postoperative anemia secondary to blood loss   - Hgb 10, eugenia 7.4, now stable 8.2  - no evidence of active bleeding, likely secondary to surgery   -Monitor CBC as outpatient     History of recurrent DVT  -Remote history  -On Xarelto, but held in setting of hematoma and surgery   -Venous Doppler without DVT  -Spoke with POA and discussed risks versus benefits of anticoagulation including intracranial hemorrhage in setting of patient's fall risk and they would like to continue to hold anticoagulation indefinitely after discharge until discussing with patient's primary care provider       Severe malnutrition  -Admit BMI 19, cachectic  -continue dietary supplementation as an outpatient      Osteoporosis  -Continue home vitamin D     Fall  History of dementia  -Alert and oriented to self only  -planning to return to [facility] for skilled nursing     This patient was seen and examined autonomously  A hospitalist attending physician was available for questions/consultation as needed. The patient expressed appropriate understanding of and agreement with the discharge recommendations, medications, and plan.      Consults this admission:  IP CONSULT TO ORTHOPEDIC SURGERY  IP CONSULT TO HOSPITALIST  IP CONSULT TO CARDIOLOGY  IP CONSULT TO DIETITIAN  IP CONSULT TO SOCIAL WORK    Discharge Instruction:   Follow up appointments: cardiology, orthopedic surgery   Primary care physician:  within 2 weeks    Diet:  regular diet   Activity: activity as tolerated  Disposition: Discharged to:   []Home, [][facility], [x]SNF, []Acute Rehab, []Hospice   Condition on discharge: Stable    Discharge Medications:      [facility] Medication Instructions [facility]:[id number]    Printed on:09/17/21 1001   Medication Information                      acetaminophen (TYLENOL) 325 MG tablet  Take 650 mg by mouth every 6 hours as needed for Pain             aspirin 325 MG EC tablet  Take 1 tablet by mouth 2 times daily for 11 doses             busPIRone (BUSPAR) 5 MG tablet  Take 5 mg by mouth 2 times daily Cholecalciferol (VITAMIN D3) 125 MCG (5000 UT) TABS  Take 5,000 Units by mouth daily             FUROSEMIDE PO  Take 10 mg by mouth daily             levothyroxine (SYNTHROID) 75 MCG tablet  Take 75 mcg by mouth Daily             melatonin 5 MG TABS tablet  Take 5 mg by mouth nightly             sertraline (ZOLOFT) 50 MG tablet  Take 1 tablet by mouth daily             traMADol (ULTRAM) 50 MG tablet  Take 1 tablet by mouth every 6 hours as needed for Pain for up to 7 days. Objective Findings at Discharge:   /68   Pulse 80   Temp 97.7 °F (36.5 °C) (Oral)   Resp 18   Ht 5' 5\" (1.651 m)   Wt 120 lb 13 oz (54.8 kg)   SpO2 93%   BMI 20.10 kg/m²            PHYSICAL EXAM  GEN Awake female, she is in pain. Appears given age. Cachectic. EYES Pupils are equally round. No scleral erythema, discharge, or conjunctivitis. HENT Mucous membranes are moist.   NECK Supple, no apparent thyromegaly or masses. RESP Clear to auscultation, no wheezes, rales or rhonchi. Symmetric chest movement while on room air. CARDIO/VASC S1/S2 auscultated. Systolic murmur. No peripheral edema. GI Abdomen is soft without significant tenderness, masses, or guarding. Bowel sounds are normoactive.  No costovertebral angle tenderness. Rosales catheter is not present. HEME/LYMPH No petechiae or ecchymoses. MSK No gross joint deformities. Right hip dressing clean, dry. Hematoma improving. SKIN Normal coloration, warm, dry. NEURO Cranial nerves appear grossly intact, normal speech, no lateralizing weakness. PSYCH Awake, alert, oriented x 1. Affect appropriate.     BMP/CBC  Recent Labs     09/15/21  1017 09/15/21  2207 09/16/21  1143 09/16/21  2248 09/17/21  0657   *  --  136  --  139   K 3.8  --  4.5  --  4.3   CL 98*  --  99  --  101   CO2 26  --  22  --  25   BUN 27*  --  37*  --  41*   CREATININE 0.7  --  0.8  --  0.8   WBC 10.7*  --  11.2*  --  8.8   HCT 25.7*   < > 27.5*  27.5* 25.0* 26.3*   --  224  --  195    < > = values in this interval not displayed.        IMAGING:  CXR, lower extremity doppler, Hip/pelvis xray     Discharge Time of 35 minutes    Electronically signed by Ally Martinez PA-C on 9/17/2021 at 10:01 AM

## 2021-09-17 NOTE — PROGRESS NOTES
Physical Therapy    Physical Therapy Treatment Note  Name: Aimee Sosa MRN: [id number] :   1931   Date:  2021   Admission Date: 2021 Room:  [address] [place], [place] [address]   Restrictions/Precautions:        R hip hemiarthroplasty on 2021  Communication with other providers:  Per nurse ok to tx and reports pt had pain meds at 0530 am. Notified nurse pt having muscle spasms and pt urinated every time we stood her up, changing depends and pad 3 times. Co-tx with PIEDRA for safety. Subjective:  Patient states:  Pt needing encouragement and support to participate in tx session. Pt very distraught and fearful at start of tx. Pain:   Location, Type, Intensity (0/10 to 10/10):  Pt did not rate do to confusion but did appear to be in distress at start of tx with legs drawling up and right hamstrings in muscle spasms. Pt was able to relax with therapists gentle rubbing out spasms and getting in better position. Pt still with some c/o pain but much more cooperative and able to participate in tx session. Pt was given ice pk to right hip at end of tx session. Objective:    Observation:  Alert, confused, and in distress laying with hips rolled to right  And crossed and pillow padding rail at start of tx . Pt much calmer and was able to participate in tx after given encouragement and support and massage to RLE. Treatment, including education/measures:  Sup to sit max/dependent of 2 with bed pad, needing increased time to move pt slowly out of bed. Once in sitting pt was able to scoot to EOB with max assist. Pt was mod assist and progressed to sba/cga for sitting balance. Refer to OT note for ADLs. While sitting EOB pt was able to do 3 reps laqs x 2 on RLE. With bilateral rails up pt was able to transfer sit<=>stand with max/dependent of 2. SPT/HUG transfer bed to chair max/dependent of 2  Turned chair to face bed and pt was able to use rails and transferred sit<=>stand with max/dependent of 2.   Safety  Patient left safely in the chair, with call light/phone in reach with alarm applied. Gait belt and mask were used for transfers and gait. Assessment / Impression:       Patient's tolerance of treatment:  fair  Adverse Reaction: na  Significant change in status and impact:  na  Barriers to improvement:  Muscle spasms  in HS, strength, safety, and confusion  Plan for Next Session:    Cont.  POC  Time in:  0930  Time out:  1028  Timed treatment minutes:  58  Total treatment time:  62    Previously filed items:     Short term goals  Time Frame for Short term goals: 1 week  Short term goal 1: Pt to complete all bed mobility mod A x1  Short term goal 2: Pt to complete all STS transfers to/from bed, commode, and chair mod A x1  Short term goal 3: Pt to complete stand pivot with LRAD mod A x1  Short term goal 4: Pt to ambulate 13' with LRAD mod A       Electronically signed by:    Annemarie Nguyen PTA  9/17/2021, 8:17 AM

## 2021-09-20 ENCOUNTER — HOSPITAL ENCOUNTER (OUTPATIENT)
Age: 86
Setting detail: SPECIMEN
Discharge: HOME | End: 2021-09-20
Payer: COMMERCIAL

## 2021-09-20 LAB
ANION GAP SERPL CALCULATED.3IONS-SCNC: 13 MMOL/L (ref 4–16)
BUN BLDV-MCNC: 34 MG/DL (ref 6–23)
CALCIUM SERPL-MCNC: 7.9 MG/DL (ref 8.3–10.6)
CHLORIDE BLD-SCNC: 102 MMOL/L (ref 99–110)
CO2: 26 MMOL/L (ref 21–32)
CREAT SERPL-MCNC: 0.8 MG/DL (ref 0.6–1.1)
GFR AFRICAN AMERICAN: >60 ML/MIN/1.73M2
GFR NON-AFRICAN AMERICAN: >60 ML/MIN/1.73M2
GLUCOSE BLD-MCNC: 107 MG/DL (ref 70–99)
HCT VFR BLD CALC: 26.2 % (ref 37–47)
HEMOGLOBIN: 7.7 GM/DL (ref 12.5–16)
MCH RBC QN AUTO: 26.4 PG (ref 27–31)
MCHC RBC AUTO-ENTMCNC: 29.4 % (ref 32–36)
MCV RBC AUTO: 89.7 FL (ref 78–100)
PDW BLD-RTO: 16.3 % (ref 11.7–14.9)
PLATELET # BLD: 220 K/CU MM (ref 140–440)
PMV BLD AUTO: 11.8 FL (ref 7.5–11.1)
POTASSIUM SERPL-SCNC: 4.1 MMOL/L (ref 3.5–5.1)
RBC # BLD: 2.92 M/CU MM (ref 4.2–5.4)
SODIUM BLD-SCNC: 141 MMOL/L (ref 135–145)
WBC # BLD: 6.8 K/CU MM (ref 4–10.5)

## 2021-09-20 PROCEDURE — 36415 COLL VENOUS BLD VENIPUNCTURE: CPT

## 2021-09-20 PROCEDURE — 80048 BASIC METABOLIC PNL TOTAL CA: CPT

## 2021-09-20 PROCEDURE — 85027 COMPLETE CBC AUTOMATED: CPT

## 2021-09-27 ENCOUNTER — HOSPITAL ENCOUNTER (OUTPATIENT)
Age: 86
Setting detail: SPECIMEN
Discharge: HOME | End: 2021-09-27
Payer: MEDICARE

## 2021-09-27 LAB
HCT VFR BLD CALC: 28.7 % (ref 37–47)
HEMOGLOBIN: 8.4 GM/DL (ref 12.5–16)
MCH RBC QN AUTO: 25.8 PG (ref 27–31)
MCHC RBC AUTO-ENTMCNC: 29.3 % (ref 32–36)
MCV RBC AUTO: 88 FL (ref 78–100)
PDW BLD-RTO: 17.2 % (ref 11.7–14.9)
PLATELET # BLD: 295 K/CU MM (ref 140–440)
PMV BLD AUTO: 11.3 FL (ref 7.5–11.1)
RBC # BLD: 3.26 M/CU MM (ref 4.2–5.4)
WBC # BLD: 11.8 K/CU MM (ref 4–10.5)

## 2021-09-27 PROCEDURE — 87086 URINE CULTURE/COLONY COUNT: CPT

## 2021-09-27 PROCEDURE — 81001 URINALYSIS AUTO W/SCOPE: CPT

## 2021-09-27 PROCEDURE — 87186 SC STD MICRODIL/AGAR DIL: CPT

## 2021-09-27 PROCEDURE — 36415 COLL VENOUS BLD VENIPUNCTURE: CPT

## 2021-09-27 PROCEDURE — 87077 CULTURE AEROBIC IDENTIFY: CPT

## 2021-09-27 PROCEDURE — 85027 COMPLETE CBC AUTOMATED: CPT

## 2021-09-28 LAB
BACTERIA: NEGATIVE /HPF
BILIRUBIN URINE: NEGATIVE MG/DL
BLOOD, URINE: ABNORMAL
CLARITY: ABNORMAL
COLOR: YELLOW
GLUCOSE, URINE: NEGATIVE MG/DL
KETONES, URINE: NEGATIVE MG/DL
LEUKOCYTE ESTERASE, URINE: ABNORMAL
NITRITE URINE, QUANTITATIVE: POSITIVE
PH, URINE: 7 (ref 5–8)
PROTEIN UA: 100 MG/DL
RBC URINE: 20 /HPF (ref 0–6)
SPECIFIC GRAVITY UA: 1.01 (ref 1–1.03)
TRICHOMONAS: ABNORMAL /HPF
UNCLASSIFIED CRYSTAL: ABNORMAL /HPF
UROBILINOGEN, URINE: NEGATIVE MG/DL (ref 0.2–1)
WBC CLUMP: ABNORMAL /HPF
WBC UA: 3865 /HPF (ref 0–5)
YEAST: ABNORMAL /HPF

## 2021-09-29 LAB
CULTURE: ABNORMAL
CULTURE: ABNORMAL
SPECIMEN: ABNORMAL

## 2021-09-30 ENCOUNTER — HOSPITAL ENCOUNTER (OUTPATIENT)
Age: 86
Setting detail: SPECIMEN
Discharge: HOME | End: 2021-09-30
Payer: MEDICARE

## 2021-09-30 LAB
HCT VFR BLD CALC: 31.8 % (ref 37–47)
HEMOGLOBIN: 9.3 GM/DL (ref 12.5–16)
MCH RBC QN AUTO: 25.5 PG (ref 27–31)
MCHC RBC AUTO-ENTMCNC: 29.2 % (ref 32–36)
MCV RBC AUTO: 87.4 FL (ref 78–100)
PDW BLD-RTO: 16.8 % (ref 11.7–14.9)
PLATELET # BLD: 270 K/CU MM (ref 140–440)
PMV BLD AUTO: 11 FL (ref 7.5–11.1)
RBC # BLD: 3.64 M/CU MM (ref 4.2–5.4)
WBC # BLD: 6.4 K/CU MM (ref 4–10.5)

## 2021-09-30 PROCEDURE — 85027 COMPLETE CBC AUTOMATED: CPT

## 2021-09-30 PROCEDURE — 36415 COLL VENOUS BLD VENIPUNCTURE: CPT

## 2021-10-04 ENCOUNTER — HOSPITAL ENCOUNTER (OUTPATIENT)
Age: 86
Setting detail: SPECIMEN
Discharge: HOME | End: 2021-10-04
Payer: MEDICARE

## 2021-10-04 LAB
ALBUMIN SERPL-MCNC: 3.5 GM/DL (ref 3.4–5)
ALP BLD-CCNC: 127 IU/L (ref 40–129)
ALT SERPL-CCNC: 12 U/L (ref 10–40)
ANION GAP SERPL CALCULATED.3IONS-SCNC: 10 MMOL/L (ref 4–16)
AST SERPL-CCNC: 20 IU/L (ref 15–37)
BILIRUB SERPL-MCNC: 0.5 MG/DL (ref 0–1)
BUN BLDV-MCNC: 29 MG/DL (ref 6–23)
CALCIUM SERPL-MCNC: 8.4 MG/DL (ref 8.3–10.6)
CHLORIDE BLD-SCNC: 99 MMOL/L (ref 99–110)
CO2: 26 MMOL/L (ref 21–32)
CREAT SERPL-MCNC: 0.5 MG/DL (ref 0.6–1.1)
GFR AFRICAN AMERICAN: >60 ML/MIN/1.73M2
GFR NON-AFRICAN AMERICAN: >60 ML/MIN/1.73M2
GLUCOSE BLD-MCNC: 76 MG/DL (ref 70–99)
HCT VFR BLD CALC: 35.4 % (ref 37–47)
HEMOGLOBIN: 9.7 GM/DL (ref 12.5–16)
MCH RBC QN AUTO: 25.1 PG (ref 27–31)
MCHC RBC AUTO-ENTMCNC: 27.4 % (ref 32–36)
MCV RBC AUTO: 91.5 FL (ref 78–100)
PDW BLD-RTO: 17.4 % (ref 11.7–14.9)
PLATELET # BLD: 225 K/CU MM (ref 140–440)
PMV BLD AUTO: 11.7 FL (ref 7.5–11.1)
POTASSIUM SERPL-SCNC: 4.2 MMOL/L (ref 3.5–5.1)
RBC # BLD: 3.87 M/CU MM (ref 4.2–5.4)
SODIUM BLD-SCNC: 135 MMOL/L (ref 135–145)
TOTAL PROTEIN: 6.2 GM/DL (ref 6.4–8.2)
TSH HIGH SENSITIVITY: 3.34 UIU/ML (ref 0.27–4.2)
WBC # BLD: 6.8 K/CU MM (ref 4–10.5)

## 2021-10-04 PROCEDURE — 84443 ASSAY THYROID STIM HORMONE: CPT

## 2021-10-04 PROCEDURE — 85027 COMPLETE CBC AUTOMATED: CPT

## 2021-10-04 PROCEDURE — 80053 COMPREHEN METABOLIC PANEL: CPT

## 2021-10-04 PROCEDURE — 36415 COLL VENOUS BLD VENIPUNCTURE: CPT

## 2021-10-06 ENCOUNTER — OFFICE VISIT (OUTPATIENT)
Dept: ORTHOPEDIC SURGERY | Age: 86
End: 2021-10-06

## 2021-10-06 VITALS
OXYGEN SATURATION: 95 % | RESPIRATION RATE: 16 BRPM | HEIGHT: 65 IN | BODY MASS INDEX: 13.83 KG/M2 | HEART RATE: 73 BPM | WEIGHT: 83 LBS

## 2021-10-06 DIAGNOSIS — Z96.649 S/P HIP HEMIARTHROPLASTY: Primary | ICD-10-CM

## 2021-10-06 PROCEDURE — 99024 POSTOP FOLLOW-UP VISIT: CPT | Performed by: PHYSICIAN ASSISTANT

## 2021-10-06 NOTE — PROGRESS NOTES
Date of surgery:   9/14/2021  Surgeon: Dr. Miles Schlatter    History:  Ms. Latonia Coyle is here in follow up regarding her right hip hemiarthroplasty. She does have some pain in the hip still. She does not seem to really be putting much weight on the right leg and is working with therapy but not walking yet. Physical:    Vitals:    10/06/21 1106   Pulse: 73   Resp: 16   SpO2: 95%   Weight: 83 lb (37.6 kg)   Height: 5' 5\" (1.651 m)     Right hip:  No pain with passive internal or external rotation of the right hip. Incision is well-healed, dressing was removed and incision showed no erythema and no active drainage. Imaging studies:  AP pelvis and 1 view of the right hip show a right hip hemiarthroplasty in good position with no evidence of periprosthetic fracture, loosening or dislocation. Impression: Status post above, doing well       Plan:   Patient Instructions   Continue weight bear as tolerated  Continue with physical therapy  Continue range of motion  Ice and elevate as needed  May now get incision wet  Follow up in 3 weeks with Dr. Miles Schlatter.

## 2021-10-14 ENCOUNTER — HOSPITAL ENCOUNTER (OUTPATIENT)
Age: 86
Setting detail: SPECIMEN
Discharge: HOME | End: 2021-10-14
Payer: MEDICARE

## 2021-10-14 LAB
ALBUMIN SERPL-MCNC: 3.5 GM/DL (ref 3.4–5)
ALP BLD-CCNC: 121 IU/L (ref 40–128)
ALT SERPL-CCNC: 8 U/L (ref 10–40)
ANION GAP SERPL CALCULATED.3IONS-SCNC: 11 MMOL/L (ref 4–16)
AST SERPL-CCNC: 15 IU/L (ref 15–37)
BILIRUB SERPL-MCNC: 0.4 MG/DL (ref 0–1)
BUN BLDV-MCNC: 31 MG/DL (ref 6–23)
CALCIUM SERPL-MCNC: 8.6 MG/DL (ref 8.3–10.6)
CHLORIDE BLD-SCNC: 102 MMOL/L (ref 99–110)
CO2: 27 MMOL/L (ref 21–32)
CREAT SERPL-MCNC: 0.7 MG/DL (ref 0.6–1.1)
GFR AFRICAN AMERICAN: >60 ML/MIN/1.73M2
GFR NON-AFRICAN AMERICAN: >60 ML/MIN/1.73M2
GLUCOSE BLD-MCNC: 75 MG/DL (ref 70–99)
HCT VFR BLD CALC: 31.9 % (ref 37–47)
HEMOGLOBIN: 9.4 GM/DL (ref 12.5–16)
MCH RBC QN AUTO: 25.2 PG (ref 27–31)
MCHC RBC AUTO-ENTMCNC: 29.5 % (ref 32–36)
MCV RBC AUTO: 85.5 FL (ref 78–100)
PDW BLD-RTO: 17 % (ref 11.7–14.9)
PLATELET # BLD: 275 K/CU MM (ref 140–440)
PMV BLD AUTO: 11.2 FL (ref 7.5–11.1)
POTASSIUM SERPL-SCNC: 4.9 MMOL/L (ref 3.5–5.1)
RBC # BLD: 3.73 M/CU MM (ref 4.2–5.4)
SODIUM BLD-SCNC: 140 MMOL/L (ref 135–145)
TOTAL PROTEIN: 6.3 GM/DL (ref 6.4–8.2)
WBC # BLD: 6.2 K/CU MM (ref 4–10.5)

## 2021-10-14 PROCEDURE — 85027 COMPLETE CBC AUTOMATED: CPT

## 2021-10-14 PROCEDURE — 80053 COMPREHEN METABOLIC PANEL: CPT

## 2021-10-14 PROCEDURE — 36415 COLL VENOUS BLD VENIPUNCTURE: CPT

## 2021-10-15 ENCOUNTER — HOSPITAL ENCOUNTER (OUTPATIENT)
Age: 86
Setting detail: SPECIMEN
Discharge: HOME | End: 2021-10-15
Payer: MEDICARE

## 2021-10-15 LAB
ALBUMIN SERPL-MCNC: 4.1 GM/DL (ref 3.4–5)
ALP BLD-CCNC: 146 IU/L (ref 40–128)
ALT SERPL-CCNC: 10 U/L (ref 10–40)
ANION GAP SERPL CALCULATED.3IONS-SCNC: 17 MMOL/L (ref 4–16)
AST SERPL-CCNC: 20 IU/L (ref 15–37)
BILIRUB SERPL-MCNC: 0.6 MG/DL (ref 0–1)
BUN BLDV-MCNC: 26 MG/DL (ref 6–23)
CALCIUM SERPL-MCNC: 9.2 MG/DL (ref 8.3–10.6)
CHLORIDE BLD-SCNC: 100 MMOL/L (ref 99–110)
CHOLESTEROL: 142 MG/DL
CO2: 24 MMOL/L (ref 21–32)
CREAT SERPL-MCNC: 0.8 MG/DL (ref 0.6–1.1)
ESTIMATED AVERAGE GLUCOSE: 108 MG/DL
GFR AFRICAN AMERICAN: >60 ML/MIN/1.73M2
GFR NON-AFRICAN AMERICAN: >60 ML/MIN/1.73M2
GLUCOSE BLD-MCNC: 96 MG/DL (ref 70–99)
HBA1C MFR BLD: 5.4 % (ref 4.2–6.3)
HDLC SERPL-MCNC: 68 MG/DL
LDL CHOLESTEROL DIRECT: 48 MG/DL
POTASSIUM SERPL-SCNC: 4.2 MMOL/L (ref 3.5–5.1)
SODIUM BLD-SCNC: 141 MMOL/L (ref 135–145)
TOTAL PROTEIN: 7.6 GM/DL (ref 6.4–8.2)
TRIGL SERPL-MCNC: 124 MG/DL

## 2021-10-15 PROCEDURE — 83721 ASSAY OF BLOOD LIPOPROTEIN: CPT

## 2021-10-15 PROCEDURE — 80053 COMPREHEN METABOLIC PANEL: CPT

## 2021-10-15 PROCEDURE — 36415 COLL VENOUS BLD VENIPUNCTURE: CPT

## 2021-10-15 PROCEDURE — 83036 HEMOGLOBIN GLYCOSYLATED A1C: CPT

## 2021-10-15 PROCEDURE — 80061 LIPID PANEL: CPT

## 2021-10-30 ENCOUNTER — APPOINTMENT (OUTPATIENT)
Dept: GENERAL RADIOLOGY | Age: 86
End: 2021-10-30
Payer: MEDICARE

## 2021-10-30 ENCOUNTER — APPOINTMENT (OUTPATIENT)
Dept: CT IMAGING | Age: 86
End: 2021-10-30
Payer: MEDICARE

## 2021-10-30 ENCOUNTER — HOSPITAL ENCOUNTER (EMERGENCY)
Age: 86
Discharge: NURSING, REHAB OR LONG-TERM CARE | End: 2021-10-31
Attending: EMERGENCY MEDICINE
Payer: MEDICARE

## 2021-10-30 VITALS
BODY MASS INDEX: 33.66 KG/M2 | HEIGHT: 63 IN | OXYGEN SATURATION: 97 % | WEIGHT: 190 LBS | SYSTOLIC BLOOD PRESSURE: 111 MMHG | DIASTOLIC BLOOD PRESSURE: 67 MMHG | HEART RATE: 93 BPM | RESPIRATION RATE: 15 BRPM | TEMPERATURE: 98.9 F

## 2021-10-30 DIAGNOSIS — S42.294A OTHER CLOSED NONDISPLACED FRACTURE OF PROXIMAL END OF RIGHT HUMERUS, INITIAL ENCOUNTER: Primary | ICD-10-CM
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
## 2021-10-30 DIAGNOSIS — Z66 DNR (DO NOT RESUSCITATE): ICD-10-CM

## 2021-10-30 DIAGNOSIS — G30.9 ALZHEIMER DISEASE (HCC): ICD-10-CM

## 2021-10-30 DIAGNOSIS — F02.80 ALZHEIMER DISEASE (HCC): ICD-10-CM

## 2021-10-30 PROCEDURE — 73030 X-RAY EXAM OF SHOULDER: CPT

## 2021-10-30 PROCEDURE — 73060 X-RAY EXAM OF HUMERUS: CPT

## 2021-10-30 PROCEDURE — 70450 CT HEAD/BRAIN W/O DYE: CPT

## 2021-10-30 PROCEDURE — 72125 CT NECK SPINE W/O DYE: CPT

## 2021-10-30 PROCEDURE — 71045 X-RAY EXAM CHEST 1 VIEW: CPT

## 2021-10-30 PROCEDURE — 72170 X-RAY EXAM OF PELVIS: CPT

## 2021-10-30 PROCEDURE — 99285 EMERGENCY DEPT VISIT HI MDM: CPT

## 2021-10-30 PROCEDURE — 6370000000 HC RX 637 (ALT 250 FOR IP): Performed by: EMERGENCY MEDICINE

## 2021-10-30 RX ORDER — ACETAMINOPHEN 500 MG
1000 TABLET ORAL ONCE
Status: COMPLETED | OUTPATIENT
Start: 2021-10-30 | End: 2021-10-30

## 2021-10-30 RX ORDER — ACETAMINOPHEN 325 MG/1
650 TABLET ORAL EVERY 6 HOURS PRN
Qty: 120 TABLET | Refills: 3 | Status: SHIPPED | OUTPATIENT
Start: 2021-10-30

## 2021-10-30 RX ADMIN — ACETAMINOPHEN 1000 MG: 500 TABLET ORAL at 20:05

## 2021-10-30 ASSESSMENT — PAIN SCALES - GENERAL: PAINLEVEL_OUTOF10: 10

## 2021-10-30 NOTE — ED PROVIDER NOTES
3 Jorge Gaona CHIEF COMPLAINT:   Fall (Yesterday from [place] home. XR conformed a right humeral neck fx)      Resighini:  Yisel Edwards is a 80 y.o. female that presents with complaint of fall yesterday. Patient from a nursing home, she has Alzheimer's apparently fell yesterday x-rays performed there showed a right humeral neck fracture. Sent here for evaluation. Patient on arrival is demented, Alzheimer's at baseline thin cachectic has no complaints but she is pleasantly confused. No family present just paperwork from the nursing home. No other questions or concerns. REVIEW OF SYSTEMS:      Review of Systems   Unable to perform ROS: Dementia   Musculoskeletal: Positive for arthralgias and myalgias.        Past Medical History:   Diagnosis Date    Aortic valvular disease     Asthma     B12 deficiency     COPD (chronic obstructive pulmonary disease) (HCC)     Dementia (HCC)     DVT (deep vein thrombosis) in pregnancy     H/O Doppler ABD [place] ultrasound 06/05/2019    NO evidence of AAA within the visualized portions of the abdominal aorta, possible right iliac artery stenosis    H/O Doppler lower venous ultrasound 06/05/2019    NO DVT OR SVT, No significant reflux in right, Significant reflux in Left CFV    H/O echocardiogram 06/05/2019    EF 45%, Moderate basal anteroseptal wall asymmetrical left ventricular hypertrophy, Left atrium mild to mod dilated, Mild to Mod AS, Mitral stenosis is severe, Mod AR and Mod Pulmonic regurg, Mild MR, TR, Mod Pulm HTN    Hyperlipidemia     Hypertension     Hypothyroidism     Iron deficiency     Osteoarthritis     Peripheral neuropathy     on EMG    Pulmonary nodule     thought benign    Seasonal allergies     Spinal stenosis     Type 2 diabetes mellitus without complication ([address]      Past Surgical History:   Procedure Laterality Date    APPENDECTOMY      BREAST BIOPSY     [address]    had teeth pulled    EYE SURGERY      HIP SURGERY Right 9/14/2021    RIGHT HIP HEMIARTHROPLASTY performed by Howie Weber DO at [facility]    lymph gland removed from neck    NOSE SURGERY      broken nose    ROTATOR CUFF REPAIR Left 2005    surgery    KAREN AND BSO  1971    THORACIC [address]    removed 3 ribs    TONSILLECTOMY AND ADENOIDECTOMY       Family History   Problem Relation Age of Onset    High Blood Pressure Mother     Heart Disease Mother     Stroke Mother     Osteoporosis Mother     High Blood Pressure Father     Heart Disease Father     Stroke Father     Cancer Sister     Heart Disease Brother     Cancer Brother      Social History     Socioeconomic History    Marital status:      Spouse name: Not on file    Number of children: Not on file    Years of education: Not on file    Highest education level: Not on file   Occupational History    Not on file   Tobacco Use    Smoking status: Former Smoker    Smokeless tobacco: Never Used   Vaping Use    Vaping Use: Never used   Substance and Sexual Activity    Alcohol use: No    Drug use: No    Sexual activity: Not on file   Other Topics Concern    Not on file   Social History Narrative    Not on file     Social Determinants of Health     Financial Resource Strain:     Difficulty of Paying Living Expenses:    Food Insecurity:     Worried About Running Out of Food in the Last Year:     [address] in the Last Year:    Transportation Needs:     Lack of Transportation (Medical):      Lack of Transportation (Non-Medical):    Physical Activity:     Days of Exercise per Week:     Minutes of Exercise per Session:    Stress:     Feeling of Stress :    Social Connections:     Frequency of Communication with Friends and Family:     Frequency of Social Gatherings with Friends and Family:     Attends Denominational Services:     Active Member of Clubs or Organizations:     Attends Club or Organization Meetings:     Marital Status:    Intimate Partner Violence:     Fear of Current or Ex-Partner:     Emotionally Abused:     Physically Abused:     Sexually Abused:      Current Facility-Administered Medications   Medication Dose Route Frequency Provider Last Rate Last Admin    acetaminophen (TYLENOL) tablet 1,000 mg  1,000 mg Oral Once Roslyn Perrin,          Current Outpatient Medications   Medication Sig Dispense Refill    acetaminophen (TYLENOL) 325 MG tablet Take 2 tablets by mouth every 6 hours as needed for Pain 120 tablet 3    aspirin 325 MG EC tablet Take 1 tablet by mouth 2 times daily for 11 doses 30 tablet 3    FUROSEMIDE PO Take 10 mg by mouth daily      melatonin 5 MG TABS tablet Take 5 mg by mouth nightly      levothyroxine (SYNTHROID) 75 MCG tablet Take 75 mcg by mouth Daily      Cholecalciferol (VITAMIN D3) 125 MCG (5000 UT) TABS Take 5,000 Units by mouth daily      busPIRone (BUSPAR) 5 MG tablet Take 5 mg by mouth 2 times daily      acetaminophen (TYLENOL) 325 MG tablet Take 650 mg by mouth every 6 hours as needed for Pain      sertraline (ZOLOFT) 50 MG tablet Take 1 tablet by mouth daily 30 tablet 5      Allergies   Allergen Reactions    Iodine Shortness Of Breath    Bee Pollen Swelling    Tetanus Toxoids Itching    Penicillins Rash     Current Facility-Administered Medications   Medication Dose Route Frequency Provider Last Rate Last Admin    acetaminophen (TYLENOL) tablet 1,000 mg  1,000 mg Oral Once Roslyn Perrin,          Current Outpatient Medications   Medication Sig Dispense Refill    acetaminophen (TYLENOL) 325 MG tablet Take 2 tablets by mouth every 6 hours as needed for Pain 120 tablet 3    aspirin 325 MG EC tablet Take 1 tablet by mouth 2 times daily for 11 doses 30 tablet 3    FUROSEMIDE PO Take 10 mg by mouth daily      melatonin 5 MG TABS tablet Take 5 mg by mouth nightly      levothyroxine (SYNTHROID) 75 MCG tablet Take 75 mcg by mouth Daily      Cholecalciferol (VITAMIN D3) 125 MCG (5000 UT) TABS Take 5,000 Units by mouth daily      busPIRone (BUSPAR) 5 MG tablet Take 5 mg by mouth 2 times daily      acetaminophen (TYLENOL) 325 MG tablet Take 650 mg by mouth every 6 hours as needed for Pain      sertraline (ZOLOFT) 50 MG tablet Take 1 tablet by mouth daily 30 tablet 5       Nursing Notes Reviewed    VITAL SIGNS:  ED Triage Vitals   Enc Vitals Group      BP       Pulse       Resp       Temp       Temp src       SpO2       Weight       Height       Head Circumference       Peak Flow       Pain Score       Pain Loc       Pain Edu? Excl. in [address]? PHYSICAL EXAM:  Physical Exam  Vitals and nursing note reviewed. Constitutional:       General: She is not in acute distress. Appearance: Normal appearance. She is well-developed and well-groomed. She is cachectic. She is not ill-appearing, toxic-appearing or diaphoretic. HENT:      Head: Normocephalic and atraumatic. Right Ear: External ear normal.      Left Ear: External ear normal.      Nose: No congestion or rhinorrhea. Eyes:      General: No scleral icterus. Right eye: No discharge. Left eye: No discharge. Extraocular Movements: Extraocular movements intact. Conjunctiva/sclera: Conjunctivae normal.      Pupils: Pupils are equal, round, and reactive to light. Neck:      Vascular: No JVD. Trachea: Phonation normal.   Cardiovascular:      Rate and Rhythm: Normal rate and regular rhythm. Pulses: Normal pulses. Heart sounds: Normal heart sounds. No murmur heard. No friction rub. No gallop. Pulmonary:      Effort: Pulmonary effort is normal. No respiratory distress. Breath sounds: Normal breath sounds. No stridor. No wheezing, rhonchi or rales. Abdominal:      General: Bowel sounds are normal. There is no distension. Palpations: Abdomen is soft. There is no mass. Tenderness: There is no abdominal tenderness.  There is no guarding or rebound. Negative signs include Solis's sign, Rovsing's sign and McBurney's sign. Hernia: No hernia is present. Musculoskeletal:         General: Swelling, tenderness and signs of injury present. No deformity. Right shoulder: Swelling and tenderness present. Decreased range of motion. Left shoulder: Normal.      Right upper arm: Swelling and tenderness present. Left upper arm: Normal.      Right elbow: Normal.      Left elbow: Normal.      Right forearm: Normal.      Left forearm: Normal.      Right wrist: Normal.      Left wrist: Normal.      Cervical back: Normal, full passive range of motion without pain and normal range of motion. No edema, erythema, signs of trauma, rigidity, torticollis or crepitus. No pain with movement, spinous process tenderness or muscular tenderness. Normal range of motion. Right hip: Normal.      Left hip: Normal.      Right upper leg: Normal.      Left upper leg: Normal.      Right knee: Normal.      Left knee: Normal.      Right lower leg: Normal. No edema. Left lower leg: Normal. No edema. Right ankle: Normal.      Left ankle: Normal.   Skin:     General: Skin is warm. Coloration: Skin is not jaundiced or pale. Findings: No bruising, erythema, lesion or rash. Neurological:      Mental Status: She is alert. Mental status is at baseline. She is disoriented and confused. GCS: GCS eye subscore is 4. GCS verbal subscore is 4. GCS motor subscore is 6. Cranial Nerves: Cranial nerves are intact. No cranial nerve deficit, dysarthria or facial asymmetry. Sensory: Sensation is intact. No sensory deficit. Motor: Motor function is intact. No weakness, tremor, atrophy, abnormal muscle tone or seizure activity. Coordination: Coordination is intact. Coordination normal.      Comments: alzheimers   Psychiatric:         Mood and Affect: Mood normal.         Behavior: Behavior normal. Behavior is cooperative. I have reviewed andinterpreted all of the currently available lab results from this visit (if applicable):    No results found for this visit on 10/30/21. Radiographs (if obtained):  [] The following radiograph was interpreted by myself in the absence of a radiologist:  [x] Radiologist's Report Reviewed:    CXR, CT Brain, C spine, Xray r shoulder/humerus, pelvis    XR HIP 1 VW W PELVIS RIGHT    Result Date: 10/6/2021  EXAMINATION: ONE XRAY VIEW OF THE PELVIS AND TWO XRAY VIEWS RIGHT HIP 10/6/2021 11:03 am COMPARISON: 9/14/2021 HISTORY: ORDERING SYSTEM PROVIDED HISTORY: Pain in right hip FINDINGS: Sequelae of right hip arthroplasty is noted. Alignment is normal.  There is no acute fracture. There is no disruption of the orthopedic hardware identified. There is no pelvic fracture identified. Large amount of stool is present throughout the colon. Sequelae of right hip arthroplasty without adverse features evident. EKG (if obtained): (All EKG's are interpreted by myself in the absence of a cardiologist)    MDM:    Patient here with fall yesterday. Again she is from nursing home has Alzheimer's, dementia at baseline. She is pleasantly confused no family or staff present. Apparently came from nursing home after she fell yesterday x-ray performed there shows a right humeral neck fracture apparently. We will repeat x-rays, imaging of head neck chest pelvis and right humerus right shoulder. She does have bruising decreased range of motion to that shoulder. Otherwise she stable she has good pulses good sensation she is a DNR CC patient. Patient doing well imaging is negative except for humeral neck fracture. Patient be put in a sling, swath will discharge home with orthopedic follow-up, she has a DNR CC as well. Patient was stable imaging otherwise negative possibly old eighth rib fracture but no pneumothorax. Patient has no complaints discharge.   She has good pulses good sensation otherwise. CLINICAL IMPRESSION:  Final diagnoses:   Fall, initial encounter   Other closed nondisplaced fracture of proximal end of right humerus, initial encounter   Alzheimer disease ([facility] [address]   DNR (do not resuscitate)       (Please note that portions of this note may have been completed with a voice recognition program. Efforts were made to edit the dictations but occasionally words aremis-transcribed.)    DISPOSITION REFERRAL (if applicable):  [facility] Emergency Department  [facility] [id number] [phone]    If symptoms worsen    [facility] - ADULT  [address] [facility],   [address] [facility]  [address] [place], [place] [id number]    Schedule an appointment as soon as possible for a visit in 1 day  Orthopedic Surgeon      DISPOSITION MEDICATIONS (if applicable):  New Prescriptions    ACETAMINOPHEN (TYLENOL) 325 MG TABLET    Take 2 tablets by mouth every 6 hours as needed for Pain          Lacretia Anam, [facility], DO  10/30/21 1560

## 2021-10-31 NOTE — ED NOTES
Sitter at bedside. Pt let this RN and sitter place sling and swathe.  Pt denies any needs     Jenny Monsalve RN  10/30/21 3452

## 2021-10-31 NOTE — ED NOTES
Pt removed sling and swathe at this time. Attempted to replace and pt yelled at this RN states get away from me you dont know me.       Navid Huynh RN  10/30/21 6003

## 2021-10-31 NOTE — ED NOTES
Pt increasingly confused, yelling at this RN to leave her alone and get away from her. Attempted to replace sling at this time.  Pt took off and threw at this RN     Sebas Horvath RN  10/30/21 5046

## 2021-10-31 NOTE — ED NOTES
TL notified about need for sitter, pt attempting to climb out end of bed.       Lorne Wan RN  10/30/21 9573

## 2021-11-02 ENCOUNTER — HOSPITAL ENCOUNTER (OUTPATIENT)
Age: 86
Setting detail: SPECIMEN
Discharge: HOME | End: 2021-11-02
Payer: MEDICARE

## 2021-11-02 LAB
ALBUMIN SERPL-MCNC: 3.2 GM/DL (ref 3.4–5)
ALP BLD-CCNC: 137 IU/L (ref 40–128)
ALT SERPL-CCNC: 6 U/L (ref 10–40)
ANION GAP SERPL CALCULATED.3IONS-SCNC: 11 MMOL/L (ref 4–16)
AST SERPL-CCNC: 10 IU/L (ref 15–37)
BILIRUB SERPL-MCNC: 0.5 MG/DL (ref 0–1)
BUN BLDV-MCNC: 29 MG/DL (ref 6–23)
CALCIUM SERPL-MCNC: 8.5 MG/DL (ref 8.3–10.6)
CHLORIDE BLD-SCNC: 103 MMOL/L (ref 99–110)
CO2: 27 MMOL/L (ref 21–32)
CREAT SERPL-MCNC: 0.6 MG/DL (ref 0.6–1.1)
GFR AFRICAN AMERICAN: >60 ML/MIN/1.73M2
GFR NON-AFRICAN AMERICAN: >60 ML/MIN/1.73M2
GLUCOSE BLD-MCNC: 82 MG/DL (ref 70–99)
HCT VFR BLD CALC: 26 % (ref 37–47)
HEMOGLOBIN: 7.6 GM/DL (ref 12.5–16)
MCH RBC QN AUTO: 25 PG (ref 27–31)
MCHC RBC AUTO-ENTMCNC: 29.2 % (ref 32–36)
MCV RBC AUTO: 85.5 FL (ref 78–100)
PDW BLD-RTO: 18.1 % (ref 11.7–14.9)
PLATELET # BLD: 232 K/CU MM (ref 140–440)
PMV BLD AUTO: 11.3 FL (ref 7.5–11.1)
POTASSIUM SERPL-SCNC: 4.3 MMOL/L (ref 3.5–5.1)
RBC # BLD: 3.04 M/CU MM (ref 4.2–5.4)
SODIUM BLD-SCNC: 141 MMOL/L (ref 135–145)
TOTAL PROTEIN: 5.9 GM/DL (ref 6.4–8.2)
WBC # BLD: 6.9 K/CU MM (ref 4–10.5)

## 2021-11-02 PROCEDURE — 36415 COLL VENOUS BLD VENIPUNCTURE: CPT

## 2021-11-02 PROCEDURE — 85027 COMPLETE CBC AUTOMATED: CPT

## 2021-11-02 PROCEDURE — 80053 COMPREHEN METABOLIC PANEL: CPT

## 2021-11-03 ENCOUNTER — OFFICE VISIT (OUTPATIENT)
Dept: ORTHOPEDIC SURGERY | Age: 86
End: 2021-11-03
Payer: MEDICARE

## 2021-11-03 VITALS
OXYGEN SATURATION: 84 % | WEIGHT: 190 LBS | HEART RATE: 98 BPM | HEIGHT: 63 IN | RESPIRATION RATE: 14 BRPM | BODY MASS INDEX: 33.66 KG/M2

## 2021-11-03 DIAGNOSIS — S42.201A DISPLACED FRACTURE OF PROXIMAL END OF RIGHT HUMERUS: Primary | ICD-10-CM

## 2021-11-03 PROCEDURE — 4040F PNEUMOC VAC/ADMIN/RCVD: CPT | Performed by: PHYSICIAN ASSISTANT

## 2021-11-03 PROCEDURE — 1123F ACP DISCUSS/DSCN MKR DOCD: CPT | Performed by: PHYSICIAN ASSISTANT

## 2021-11-03 PROCEDURE — 1036F TOBACCO NON-USER: CPT | Performed by: PHYSICIAN ASSISTANT

## 2021-11-03 PROCEDURE — G8484 FLU IMMUNIZE NO ADMIN: HCPCS | Performed by: PHYSICIAN ASSISTANT

## 2021-11-03 PROCEDURE — G8417 CALC BMI ABV UP PARAM F/U: HCPCS | Performed by: PHYSICIAN ASSISTANT

## 2021-11-03 PROCEDURE — G8427 DOCREV CUR MEDS BY ELIG CLIN: HCPCS | Performed by: PHYSICIAN ASSISTANT

## 2021-11-03 PROCEDURE — 1090F PRES/ABSN URINE INCON ASSESS: CPT | Performed by: PHYSICIAN ASSISTANT

## 2021-11-03 PROCEDURE — 99213 OFFICE O/P EST LOW 20 MIN: CPT | Performed by: PHYSICIAN ASSISTANT

## 2021-11-03 RX ORDER — DOCUSATE SODIUM 100 MG/1
100 CAPSULE, LIQUID FILLED ORAL 2 TIMES DAILY
COMMUNITY

## 2021-11-03 RX ORDER — LORAZEPAM 0.5 MG/1
0.5 TABLET ORAL EVERY 4 HOURS PRN
COMMUNITY

## 2021-11-03 RX ORDER — ARIPIPRAZOLE 2 MG/1
2 TABLET ORAL DAILY
COMMUNITY

## 2021-11-03 RX ORDER — METHOCARBAMOL 500 MG/1
500 TABLET, FILM COATED ORAL 3 TIMES DAILY
COMMUNITY

## 2021-11-03 RX ORDER — POLYETHYLENE GLYCOL 3350 17 G/17G
17 POWDER, FOR SOLUTION ORAL DAILY
COMMUNITY

## 2021-11-03 ASSESSMENT — ENCOUNTER SYMPTOMS
GASTROINTESTINAL NEGATIVE: 1
EYES NEGATIVE: 1
RESPIRATORY NEGATIVE: 1

## 2021-11-03 NOTE — PROGRESS NOTES
Review of Systems   Constitutional: Negative. HENT: Negative. Eyes: Negative. Respiratory: Negative. Cardiovascular: Negative. Gastrointestinal: Negative. Genitourinary: Negative. Musculoskeletal: Positive for arthralgias and myalgias. Skin: Negative. Neurological: Negative. Psychiatric/Behavioral: Negative. Haley Hatfield is a 80 y.o. female that presents to the office as an ED follow up to discuss a recent right humerus fracture that occurred on October 29, 2021 when patient fell. No pain is reported in office today for the right extremity and the patient's power of  reports that the patient is non compliant with use of the sling and keeps removing the sling. The patient also recently just had surgery for a femoral neck fracture for which she underwent a right hip hemiarthroplasty. She is not complaining of hip pain.       Past Medical History:   Diagnosis Date    Aortic valvular disease     Asthma     B12 deficiency     [facility]ronic obstructive [facility]) [facility]     Dementia ([facility])     DVT (deep vein thrombosis) in pregnancy     H/O Doppler ABD A[facility]ia ultrasound 06/05/2019    NO evidence of AAA within the visualized portions of the abdominal aorta, possible right iliac artery stenosis    H/O Doppler lower venous ultrasound 06/05/2019    NO DVT OR SVT, No significant reflux in right, Significant reflux in Left CFV    H/O echocardiogram 06/05/2019    EF 45%, Moderate basal anteroseptal wall asymmetrical left ventricular hypertrophy, Left atrium mild to mod dilated, Mild to Mod AS, Mitral stenosis is severe, Mod AR and Mod Pulmonic regurg, Mild MR, TR, Mod Pulm HTN    Hyperlipidemia     Hypertension     Hypothyroidism     Iron deficiency     Osteoarthritis     Peripheral neuropathy     on EMG    Pulmonary nodule     thought benign    Seasonal allergies     Spinal stenosis     Type 2 diabetes mellitus without complication ([facility] [address]        Past Surgical History:   Procedure Laterality Date    APPENDECTOMY      BREAST BIOPSY     [address]    had teeth pulled    EYE SURGERY      HIP SURGERY Right 9/14/2021    RIGHT HIP HEMIARTHROPLASTY performed by Cristina Garcia DO at [facility]    lymph gland removed from neck    NOSE SURGERY      broken nose    ROTATOR CUFF REPAIR Left 2005    surgery    KAREN AND BSO  1971    THORACIC [address]    removed 3 ribs    TONSILLECTOMY AND ADENOIDECTOMY         Family History   Problem Relation Age of Onset    High Blood Pressure Mother     Heart Disease Mother     Stroke Mother     Osteoporosis Mother     High Blood Pressure Father     Heart Disease Father     Stroke Father     Cancer Sister     Heart Disease Brother     Cancer Brother        Social History     Socioeconomic History    Marital status:      Spouse name: None    Number of children: None    Years of education: None    Highest education level: None   Occupational History    None   Tobacco Use    Smoking status: Former Smoker    Smokeless tobacco: Never Used   Vaping Use    Vaping Use: Never used   Substance and Sexual Activity    Alcohol use: No    Drug use: No    Sexual activity: None   Other Topics Concern    None   Social History Narrative    None     Social Determinants of Health     Financial Resource Strain:     Difficulty of Paying Living Expenses:    Food Insecurity:     Worried About Running Out of Food in the Last Year:     Ran Out of Food in the Last Year:    Transportation Needs:     Lack of Transportation (Medical):      Lack of Transportation (Non-Medical):    Physical Activity:     Days of Exercise per Week:     Minutes of Exercise per Session:    Stress:     Feeling of Stress :    Social Connections:     Frequency of Communication with Friends and Family:     Frequency of Social Gatherings with Friends and Family:     Attends Orthodoxy Services:     Active Member of Clubs or Organizations:     Attends Club or Organization Meetings:     Marital Status:    Intimate Partner Violence:     Fear of Current or Ex-Partner:     Emotionally Abused:     Physically Abused:     Sexually Abused:        Current Outpatient Medications   Medication Sig Dispense Refill    methocarbamol (ROBAXIN) 500 MG tablet Take 500 mg by mouth 3 times daily      docusate sodium (COLACE) 100 MG capsule Take 100 mg by mouth 2 times daily      polyethylene glycol (GLYCOLAX) 17 GM/SCOOP powder Take 17 g by mouth daily      LORazepam (ATIVAN) 0.5 MG tablet Take 0.5 mg by mouth every 4 hours as needed for Anxiety.  ARIPiprazole (ABILIFY) 2 MG tablet Take 2 mg by mouth daily      magnesium hydroxide (MILK OF MAGNESIA) 400 MG/5ML suspension Take by mouth daily as needed for Constipation      acetaminophen (TYLENOL) 325 MG tablet Take 2 tablets by mouth every 6 hours as needed for Pain 120 tablet 3    FUROSEMIDE PO Take 10 mg by mouth daily      melatonin 5 MG TABS tablet Take 5 mg by mouth nightly      levothyroxine (SYNTHROID) 75 MCG tablet Take 75 mcg by mouth Daily      Cholecalciferol (VITAMIN D3) 125 MCG (5000 UT) TABS Take 5,000 Units by mouth daily      busPIRone (BUSPAR) 5 MG tablet Take 5 mg by mouth 2 times daily      sertraline (ZOLOFT) 50 MG tablet Take 1 tablet by mouth daily 30 tablet 5    aspirin 325 MG EC tablet Take 1 tablet by mouth 2 times daily for 11 doses 30 tablet 3    acetaminophen (TYLENOL) 325 MG tablet Take 650 mg by mouth every 6 hours as needed for Pain (Patient not taking: Reported on 11/3/2021)       No current facility-administered medications for this visit.        Allergies   Allergen Reactions    Iodine Shortness Of Breath    Bee Pollen Swelling    Tetanus Toxoids Itching    Penicillins Rash       Review of Systems:  See above      Physical Exam:   Pulse 98   Resp 14   Ht 5' 3\" (1.6 m)   Wt 190 lb (86.2 kg) SpO2 (!) 84%   BMI 33.66 kg/m²       Patient presents today in a wheelchair, nonweightbearing. Gen/Psych:Examination reveals a pleasant individual in no acute distress. The patient appears pleasantly confused but is alert to self.     Lymph: The lymphatic examination bilaterally reveals all areas to be without enlargement or induration.      Skin intact without lymphadenopathy, discoloration, or abnormal temperature.      Vascular: There is intact, symmetric circulation in both upper extremities. Right arm: There is significant ecchymosis extending down to the right hand. There is moderate edema in the right upper extremity. Tenderness to palpation over the proximal humerus. Motion not tested due to known fracture  Range of motion of the right wrist and right elbow are intact with no pain. Outsiderecord review: ER note and x-rays reviewed    Imaging studies:  X-rays of the right humerus show a displaced proximal humerus fracture of the surgical neck with near 100% displacement and no dislocation. Impression:     Diagnosis Orders   1. Displaced fracture of proximal end of right humerus             Plan: I did show the x-rays to Dr. Pietro Fuentes who given the patient's age stated that he would likely treat this nonsurgically. I had an extensive conversation with the patient's power of  today regarding the fracture. I explained that there is significant displacement of this fracture and without surgical treatment she will not have a appreciable use of the right shoulder. I did explain that there is a risk involved regarding surgery given the patient's age. I explained that I felt the patient would likely need a reverse total shoulder replacement. The patient's power of  elected at this time to not pursue surgery. We will instead proceed with the following plan.   Patient Instructions   Fitted for sling  Wear sling as discussed, removing sling for hygiene  May continue to take Tylenol as needed  Call office if you would like to proceed with surgery for the humerus fracture  Follow up in 6 weeks                 XR HUMERUS RIGHT (MIN 2 VIEWS)  Impression   1. CHEST: No radiographic evidence of acute cardiopulmonary disease. 2. Possible acute or chronic fracture posterior left rib 8.   3. RIGHT HUMERUS: Acute, near medial over riding mildly comminuted transverse   fracture across the surgical neck proximal right humeral metaphysis. 4. LEFT SHOULDER: No acute osseous abnormality. 5. Mild AC joint osteoarthritis. 6. Follow-up imaging recommended if pain persists or worsens following   conservative management.

## 2021-11-03 NOTE — PATIENT INSTRUCTIONS
Fitted for sling  Wear sling as discussed, removing sling for hygiene  May continue to take Tylenol as needed  Call office if you would like to proceed with surgery for the humerus fracture  Follow up in 6 weeks

## 2021-11-08 ENCOUNTER — HOSPITAL ENCOUNTER (OUTPATIENT)
Age: 86
Setting detail: SPECIMEN
Discharge: HOME | End: 2021-11-08
Payer: MEDICARE

## 2021-11-08 LAB
HCT VFR BLD CALC: 29.9 % (ref 37–47)
HEMOGLOBIN: 8.5 GM/DL (ref 12.5–16)

## 2021-11-08 PROCEDURE — 36415 COLL VENOUS BLD VENIPUNCTURE: CPT

## 2021-11-08 PROCEDURE — 85018 HEMOGLOBIN: CPT

## 2021-11-08 PROCEDURE — 85014 HEMATOCRIT: CPT

## (undated) DEVICE — GLOVE SURG SZ 8 L12IN THK75MIL DK GRN LTX FREE

## (undated) DEVICE — CSTM HIP POUCH PACK: Brand: MEDLINE INDUSTRIES, INC.

## (undated) DEVICE — SYSTEM SKIN CLSR 22CM DERMBND PRINEO

## (undated) DEVICE — PACK PROCEDURE SURG TOT HIP LF

## (undated) DEVICE — ELECTRODE ES AD CRDLSS PT RET REM POLYHESIVE

## (undated) DEVICE — GLOVE ORANGE PI 7 1/2   MSG9075

## (undated) DEVICE — SUTURE FIBERWIRE SZ 5 L38IN NONABSORBABLE BLU L48MM 1/2 AR7211

## (undated) DEVICE — FAN SPRAY KIT: Brand: PULSAVAC®

## (undated) DEVICE — SUTURE VCRL SZ 1 L27IN ABSRB UD L36MM CT-1 1/2 CIR JJ40G

## (undated) DEVICE — TOWEL,OR,DSP,ST,BLUE,STD,6/PK,12PK/CS: Brand: MEDLINE

## (undated) DEVICE — CHLORAPREP 26ML ORANGE

## (undated) DEVICE — SUTURE ABSORBABLE 3-0 PS-1 18 IN UD MONOCRYL + STRATAFIX SXMP1B102

## (undated) DEVICE — RECIPROCATING BLADE, OFFSET (47.5 X 0.77 X 6.0MM)

## (undated) DEVICE — SUTURE VCRL SZ 2-0 L18IN ABSRB UD CT-1 L36MM 1/2 CIR J839D